# Patient Record
Sex: FEMALE | Race: WHITE | Employment: OTHER | URBAN - NONMETROPOLITAN AREA
[De-identification: names, ages, dates, MRNs, and addresses within clinical notes are randomized per-mention and may not be internally consistent; named-entity substitution may affect disease eponyms.]

---

## 2017-03-10 RX ORDER — LISINOPRIL 40 MG/1
TABLET ORAL
Qty: 90 TABLET | Refills: 4 | OUTPATIENT
Start: 2017-03-10

## 2017-03-24 RX ORDER — LISINOPRIL 40 MG/1
TABLET ORAL
Qty: 60 TABLET | Refills: 0 | Status: ON HOLD | OUTPATIENT
Start: 2017-03-24 | End: 2017-10-27

## 2017-05-08 RX ORDER — ATENOLOL 50 MG/1
TABLET ORAL
Qty: 30 TABLET | Refills: 1 | Status: SHIPPED | OUTPATIENT
Start: 2017-05-08 | End: 2017-07-07 | Stop reason: SDUPTHER

## 2017-05-08 RX ORDER — CLONIDINE HYDROCHLORIDE 0.1 MG/1
TABLET ORAL
Qty: 60 TABLET | Refills: 1 | Status: SHIPPED | OUTPATIENT
Start: 2017-05-08 | End: 2017-07-07 | Stop reason: SDUPTHER

## 2017-06-05 ENCOUNTER — OFFICE VISIT (OUTPATIENT)
Dept: VASCULAR SURGERY | Age: 57
End: 2017-06-05
Payer: MEDICAID

## 2017-06-05 ENCOUNTER — HOSPITAL ENCOUNTER (OUTPATIENT)
Dept: ULTRASOUND IMAGING | Age: 57
Discharge: HOME | End: 2017-06-05
Payer: MEDICAID

## 2017-06-05 VITALS
HEART RATE: 81 BPM | TEMPERATURE: 96 F | RESPIRATION RATE: 18 BRPM | SYSTOLIC BLOOD PRESSURE: 144 MMHG | DIASTOLIC BLOOD PRESSURE: 88 MMHG

## 2017-06-05 DIAGNOSIS — I77.819 ECTATIC AORTA (HCC): ICD-10-CM

## 2017-06-05 DIAGNOSIS — I71.40 ABDOMINAL AORTIC ANEURYSM WITHOUT RUPTURE: ICD-10-CM

## 2017-06-05 PROCEDURE — 99212 OFFICE O/P EST SF 10 MIN: CPT | Performed by: NURSE PRACTITIONER

## 2017-06-05 PROCEDURE — 93978 VASCULAR STUDY: CPT

## 2017-06-06 RX ORDER — LISINOPRIL 40 MG/1
TABLET ORAL
Qty: 60 TABLET | Refills: 0 | OUTPATIENT
Start: 2017-06-06

## 2017-07-07 RX ORDER — ATENOLOL 50 MG/1
TABLET ORAL
Qty: 30 TABLET | Refills: 0 | Status: SHIPPED | OUTPATIENT
Start: 2017-07-07 | End: 2017-08-04 | Stop reason: SDUPTHER

## 2017-07-07 RX ORDER — CLONIDINE HYDROCHLORIDE 0.1 MG/1
TABLET ORAL
Qty: 60 TABLET | Refills: 0 | Status: SHIPPED | OUTPATIENT
Start: 2017-07-07 | End: 2017-08-05 | Stop reason: SDUPTHER

## 2017-08-04 RX ORDER — ATENOLOL 50 MG/1
TABLET ORAL
Qty: 30 TABLET | Refills: 2 | Status: SHIPPED | OUTPATIENT
Start: 2017-08-04 | End: 2017-10-27 | Stop reason: HOSPADM

## 2017-08-07 RX ORDER — CLONIDINE HYDROCHLORIDE 0.1 MG/1
0.1 TABLET ORAL 2 TIMES DAILY
Qty: 60 TABLET | Refills: 1 | Status: ON HOLD | OUTPATIENT
Start: 2017-08-07 | End: 2017-10-27

## 2017-09-21 RX ORDER — NITROGLYCERIN 0.4 MG/1
TABLET SUBLINGUAL
Qty: 25 TABLET | Refills: 0 | OUTPATIENT
Start: 2017-09-21

## 2017-10-02 RX ORDER — CLONIDINE HYDROCHLORIDE 0.1 MG/1
TABLET ORAL
Qty: 60 TABLET | Refills: 1 | OUTPATIENT
Start: 2017-10-02

## 2017-10-24 ENCOUNTER — HOSPITAL ENCOUNTER (EMERGENCY)
Facility: HOSPITAL | Age: 57
Discharge: HOME | End: 2017-10-24
Attending: EMERGENCY MEDICINE | Admitting: EMERGENCY MEDICINE

## 2017-10-24 VITALS
RESPIRATION RATE: 14 BRPM | WEIGHT: 162 LBS | HEART RATE: 84 BPM | BODY MASS INDEX: 28.7 KG/M2 | SYSTOLIC BLOOD PRESSURE: 96 MMHG | TEMPERATURE: 97.9 F | OXYGEN SATURATION: 91 % | HEIGHT: 63 IN | DIASTOLIC BLOOD PRESSURE: 65 MMHG

## 2017-10-24 DIAGNOSIS — R07.89 CHEST WALL PAIN: Primary | ICD-10-CM

## 2017-10-24 LAB — TROPONIN I SERPL-MCNC: <0.012 NG/ML (ref 0–0.03)

## 2017-10-24 PROCEDURE — 25010000002 HYDROMORPHONE PER 4 MG: Performed by: EMERGENCY MEDICINE

## 2017-10-24 PROCEDURE — 96375 TX/PRO/DX INJ NEW DRUG ADDON: CPT

## 2017-10-24 PROCEDURE — 96376 TX/PRO/DX INJ SAME DRUG ADON: CPT

## 2017-10-24 PROCEDURE — 99284 EMERGENCY DEPT VISIT MOD MDM: CPT

## 2017-10-24 PROCEDURE — 93010 ELECTROCARDIOGRAM REPORT: CPT | Performed by: INTERNAL MEDICINE

## 2017-10-24 PROCEDURE — 84484 ASSAY OF TROPONIN QUANT: CPT | Performed by: EMERGENCY MEDICINE

## 2017-10-24 PROCEDURE — 96374 THER/PROPH/DIAG INJ IV PUSH: CPT

## 2017-10-24 PROCEDURE — 25010000002 MORPHINE PER 10 MG: Performed by: EMERGENCY MEDICINE

## 2017-10-24 PROCEDURE — 25010000002 ONDANSETRON PER 1 MG: Performed by: EMERGENCY MEDICINE

## 2017-10-24 PROCEDURE — 93005 ELECTROCARDIOGRAM TRACING: CPT | Performed by: EMERGENCY MEDICINE

## 2017-10-24 RX ORDER — ONDANSETRON 2 MG/ML
4 INJECTION INTRAMUSCULAR; INTRAVENOUS ONCE
Status: COMPLETED | OUTPATIENT
Start: 2017-10-24 | End: 2017-10-24

## 2017-10-24 RX ORDER — OXYCODONE AND ACETAMINOPHEN 7.5; 325 MG/1; MG/1
1 TABLET ORAL EVERY 4 HOURS PRN
Qty: 15 TABLET | Refills: 0 | Status: SHIPPED | OUTPATIENT
Start: 2017-10-24 | End: 2018-05-10 | Stop reason: HOSPADM

## 2017-10-24 RX ORDER — MORPHINE SULFATE 4 MG/ML
4 INJECTION, SOLUTION INTRAMUSCULAR; INTRAVENOUS ONCE
Status: COMPLETED | OUTPATIENT
Start: 2017-10-24 | End: 2017-10-24

## 2017-10-24 RX ORDER — METHYLPREDNISOLONE 4 MG/1
TABLET ORAL
Qty: 1 EACH | Refills: 0 | Status: SHIPPED | OUTPATIENT
Start: 2017-10-24 | End: 2018-02-27

## 2017-10-24 RX ADMIN — ONDANSETRON 4 MG: 2 INJECTION INTRAMUSCULAR; INTRAVENOUS at 05:11

## 2017-10-24 RX ADMIN — MORPHINE SULFATE 4 MG: 4 INJECTION, SOLUTION INTRAMUSCULAR; INTRAVENOUS at 04:28

## 2017-10-24 RX ADMIN — ONDANSETRON 4 MG: 2 INJECTION INTRAMUSCULAR; INTRAVENOUS at 04:28

## 2017-10-24 RX ADMIN — HYDROMORPHONE HYDROCHLORIDE 1 MG: 1 INJECTION, SOLUTION INTRAMUSCULAR; INTRAVENOUS; SUBCUTANEOUS at 05:11

## 2017-10-24 NOTE — ED PROVIDER NOTES
Subjective   HPI Comments: Patient complains of sudden onset of severe anterior chest pain tonight while watching TV.  She describes the pain as being very sharp in nature.  It is different than her heart pain in the past but she is concerned about her heart because of her history so she went to the [facility] emergency room.  Her evaluation there was negative but they sent her here for further evaluation.  She says she has had a bad cough and congestion over the past week or 2.  She does not really relate this to breathing but says coughing and movement does make the pain worse.  Her heart pain in the past was more of a pressure sensation and not like this.    Patient is a 57 y.o. female presenting with chest pain.   History provided by:  Patient   used: No    Chest Pain   Pain location:  L chest and R chest  Pain quality: aching and sharp    Pain radiates to:  Does not radiate  Pain severity:  Severe  Onset quality:  Sudden  Duration:  10 hours  Timing:  Constant  Progression:  Unchanged  Chronicity:  New  Context: movement    Context: not breathing, not drug use, not eating, not intercourse, not lifting, not raising an arm, not at rest, not stress and not trauma    Relieved by:  Nothing  Worsened by:  Coughing and movement  Ineffective treatments:  None tried  Associated symptoms: cough and shortness of breath    Associated symptoms: no abdominal pain, no AICD problem, no altered mental status, no anorexia, no anxiety, no back pain, no claudication, no diaphoresis, no dizziness, no dysphagia, no fatigue, no fever, no headache, no heartburn, no lower extremity edema, no nausea, no near-syncope, no numbness, no orthopnea, no palpitations, no PND, no syncope, no vomiting and no weakness    Risk factors: coronary artery disease and smoking    Risk factors: no aortic disease, no birth control, no diabetes mellitus, no Omar-Danlos syndrome, no high cholesterol, no hypertension, no  immobilization, not male, no Marfan's syndrome, not obese, not pregnant, no prior DVT/PE and no surgery        Review of Systems   Constitutional: Negative.  Negative for diaphoresis, fatigue and fever.   HENT: Negative.  Negative for trouble swallowing.    Respiratory: Positive for cough and shortness of breath.    Cardiovascular: Positive for chest pain. Negative for palpitations, orthopnea, claudication, syncope, PND and near-syncope.   Gastrointestinal: Negative.  Negative for abdominal pain, anorexia, heartburn, nausea and vomiting.   Genitourinary: Negative.    Musculoskeletal: Negative.  Negative for back pain.   Skin: Negative.    Neurological: Negative.  Negative for dizziness, weakness, numbness and headaches.   Hematological: Negative.    Psychiatric/Behavioral: Negative.    All other systems reviewed and are negative.      Past Medical History:   Diagnosis Date   • COPD (chronic obstructive pulmonary disease)    • Hypertension    • Low back pain potentially associated with spinal stenosis    • Lupus    • Scoliosis        Allergies   Allergen Reactions   • Aspirin      Nausea and vomitting   • Latex      Blisters with tape and intolerance with gloves       Past Surgical History:   Procedure Laterality Date   • CARDIAC CATHETERIZATION     • CARDIAC DEFIBRILLATOR PLACEMENT     • CHOLECYSTECTOMY     • HYSTERECTOMY         History reviewed. No pertinent family history.    Social History     Social History   • Marital status: Single     Spouse name: N/A   • Number of children: N/A   • Years of education: N/A     Social History Main Topics   • Smoking status: Current Every Day Smoker     Packs/day: 1.00     Types: Cigarettes   • Smokeless tobacco: None   • Alcohol use No   • Drug use: No   • Sexual activity: Defer     Other Topics Concern   • None     Social History Narrative   • None       Prior to Admission medications    Medication Sig Start Date End Date Taking? Authorizing Provider   atenolol (TENORMIN) 50  MG tablet TAKE 1 TABLET BY MOUTH DAILY 8/4/17  Yes Robert Padilla MD   CloNIDine (CATAPRES) 0.1 MG tablet Take 1 tablet by mouth 2 (Two) Times a Day. Needs appt for further refills 8/7/17  Yes Robert Padilla MD   diazePAM (VALIUM) 10 MG tablet 5 mg Every 6 (Six) Hours.   Yes Historical Provider, MD   doxepin (SINEquan) 100 MG capsule Take 100 mg by mouth nightly   Yes Historical Provider, MD   estrogens, conjugated, (PREMARIN) 0.625 MG tablet Take 0.625 mg by mouth daily.   Yes Historical Provider, MD   gabapentin (NEURONTIN) 300 MG capsule Take 300 mg by mouth Daily.   Yes Historical Provider, MD   hydrochlorothiazide (HYDRODIURIL) 25 MG tablet TAKE 1 TABLET BY MOUTH DAILY 11/9/16  Yes Roebrt Padilla MD   HYDROcodone-acetaminophen (NORCO)  MG per tablet Every 6 (Six) Hours.   Yes Historical Provider, MD   lisinopril (PRINIVIL,ZESTRIL) 40 MG tablet 1 (ONE) TABLET, ORAL, TWO TIMES DAILY 3/24/17  Yes Robert Padilla MD   nitroglycerin (NITROSTAT) 0.4 MG SL tablet Place 0.4 mg under the tongue every 5 minutes as needed.     Yes Historical Provider, MD   omeprazole (priLOSEC) 20 MG capsule Take 20 mg by mouth daily   Yes Historical Provider, MD   SUMAtriptan (IMITREX) 100 MG tablet Take 100 mg by mouth once as needed for Migraine   Yes Historical Provider, MD   hydrochlorothiazide (HYDRODIURIL) 25 MG tablet Take 25 mg by mouth daily    Historical Provider, MD   lisinopril (PRINIVIL,ZESTRIL) 20 MG tablet Take 1 tablet by mouth 2 times daily. 2/6/13   Historical Provider, MD   MethylPREDNISolone (MEDROL, KENN,) 4 MG tablet Take as directed on package instructions. 10/24/17   Imtiaz Felix Jr., MD   oxyCODONE-acetaminophen (PERCOCET) 7.5-325 MG per tablet Take 1 tablet by mouth Every 4 (Four) Hours As Needed for Moderate Pain . 10/24/17   Imtiaz Felix Jr., MD       Medications   Morphine sulfate (PF) injection 4 mg (4 mg Intravenous Given 10/24/17 0428)   ondansetron (ZOFRAN) injection 4 mg (4 mg Intravenous Given  10/24/17 0428)   HYDROmorphone (DILAUDID) injection 1 mg (1 mg Intravenous Given 10/24/17 0511)   ondansetron (ZOFRAN) injection 4 mg (4 mg Intravenous Given 10/24/17 0511)       Vitals:    10/24/17 0446   BP: 110/58   Pulse:    Resp:    Temp:    SpO2:          Objective   Physical Exam   Constitutional: She is oriented to person, place, and time. She appears well-developed and well-nourished.   HENT:   Head: Normocephalic and atraumatic.   Mouth/Throat: Oropharynx is clear and moist.   Eyes: EOM are normal. Pupils are equal, round, and reactive to light.   Neck: Neck supple.   Cardiovascular: Normal rate and regular rhythm.    Pulmonary/Chest: Effort normal and breath sounds normal. She exhibits tenderness.   Patient is markedly tender to palpation across the anterior chest wall.  There is no crepitus or deformity noted.  She is slightly tachypnea because she does not take a deep breath because the pain.   Abdominal: Soft. Bowel sounds are normal.   Musculoskeletal: Normal range of motion.   Neurological: She is alert and oriented to person, place, and time.   Skin: Skin is warm and dry.   Psychiatric: She has a normal mood and affect. Her behavior is normal.   Nursing note and vitals reviewed.      Procedures         Lab Results (last 24 hours)     Procedure Component Value Units Date/Time    Troponin [[id number]]  (Normal) Collected:  10/24/17 0416    Specimen:  Blood Updated:  10/24/17 0445     Troponin I <0.012 ng/mL           No orders to display       ED Course  ED Course   Comment By Time   I told the patient that her testing is negative for any evidence of myocardial infarction.  I do not think this is cardiac pain but more to be a chest wall pain but I could not be 100% certain.  I offered to keep her for a stress test or to Cipro treat her as a chest wall pain that I think it is and after discussion with her  she decided she wanted treatment for chest wall pain and to go home.  She is discharged in  stable condition. Imtiaz Felix Jr., MD 10/24 0527         HEART Score (for prediction of 6-week risk of major adverse cardiac event) reviewed and/or performed as part of the patient evaluation and treatment planning process.  The result associated with this review/performance is: 4      MDM  Number of Diagnoses or Management Options  Chest wall pain: new and requires workup     Amount and/or Complexity of Data Reviewed  Clinical lab tests: reviewed and ordered  Tests in the radiology section of CPT®: ordered and reviewed  Tests in the medicine section of CPT®: ordered and reviewed    Risk of Complications, Morbidity, and/or Mortality  Presenting problems: moderate  Diagnostic procedures: moderate  Management options: moderate    Patient Progress  Patient progress: stable      Final diagnoses:   Chest wall pain          Imtiaz Felix Jr., MD  10/24/17 0527

## 2017-10-25 ENCOUNTER — HOSPITAL ENCOUNTER (OUTPATIENT)
Facility: HOSPITAL | Age: 57
Setting detail: OBSERVATION
Discharge: HOME | End: 2017-10-27
Attending: INTERNAL MEDICINE | Admitting: INTERNAL MEDICINE

## 2017-10-25 ENCOUNTER — APPOINTMENT (OUTPATIENT)
Dept: CARDIOLOGY | Facility: HOSPITAL | Age: 57
End: 2017-10-25

## 2017-10-25 ENCOUNTER — APPOINTMENT (OUTPATIENT)
Dept: GENERAL RADIOLOGY | Facility: HOSPITAL | Age: 57
End: 2017-10-25

## 2017-10-25 ENCOUNTER — OFFICE VISIT (OUTPATIENT)
Dept: CARDIOLOGY | Facility: CLINIC | Age: 57
End: 2017-10-25

## 2017-10-25 ENCOUNTER — APPOINTMENT (OUTPATIENT)
Dept: CT IMAGING | Facility: HOSPITAL | Age: 57
End: 2017-10-25

## 2017-10-25 VITALS
RESPIRATION RATE: 18 BRPM | HEIGHT: 63 IN | WEIGHT: 177 LBS | HEART RATE: 92 BPM | DIASTOLIC BLOOD PRESSURE: 82 MMHG | BODY MASS INDEX: 31.36 KG/M2 | SYSTOLIC BLOOD PRESSURE: 128 MMHG

## 2017-10-25 DIAGNOSIS — R06.02 SHORTNESS OF BREATH: Primary | ICD-10-CM

## 2017-10-25 DIAGNOSIS — R10.11 RIGHT UPPER QUADRANT ABDOMINAL PAIN: ICD-10-CM

## 2017-10-25 DIAGNOSIS — I25.10 NONOBSTRUCTIVE ATHEROSCLEROSIS OF CORONARY ARTERY: ICD-10-CM

## 2017-10-25 DIAGNOSIS — Z72.0 TOBACCO ABUSE: ICD-10-CM

## 2017-10-25 DIAGNOSIS — I10 ESSENTIAL HYPERTENSION: ICD-10-CM

## 2017-10-25 DIAGNOSIS — M79.89 LEG SWELLING: ICD-10-CM

## 2017-10-25 DIAGNOSIS — E78.2 MIXED HYPERLIPIDEMIA: ICD-10-CM

## 2017-10-25 DIAGNOSIS — R07.2 PRECORDIAL PAIN: ICD-10-CM

## 2017-10-25 PROBLEM — E78.5 HYPERLIPIDEMIA: Status: ACTIVE | Noted: 2017-10-25

## 2017-10-25 PROBLEM — R06.00 DYSPNEA: Status: ACTIVE | Noted: 2017-10-25

## 2017-10-25 PROBLEM — R10.9 ABDOMINAL PAIN: Status: ACTIVE | Noted: 2017-10-25

## 2017-10-25 LAB
ALBUMIN SERPL-MCNC: 4.5 G/DL (ref 3.5–5)
ALBUMIN/GLOB SERPL: 1.4 G/DL (ref 1.1–2.5)
ALP SERPL-CCNC: 85 U/L (ref 24–120)
ALT SERPL W P-5'-P-CCNC: 29 U/L (ref 0–54)
AMYLASE SERPL-CCNC: 70 U/L (ref 30–110)
ANION GAP SERPL CALCULATED.3IONS-SCNC: 13 MMOL/L (ref 4–13)
AST SERPL-CCNC: 21 U/L (ref 7–45)
BASOPHILS # BLD AUTO: 0.01 10*3/MM3 (ref 0–0.2)
BASOPHILS NFR BLD AUTO: 0.1 % (ref 0–2)
BH CV ECHO MEAS - AO MAX PG (FULL): 5.5 MMHG
BH CV ECHO MEAS - AO MAX PG: 7.6 MMHG
BH CV ECHO MEAS - AO MEAN PG (FULL): 3 MMHG
BH CV ECHO MEAS - AO MEAN PG: 4 MMHG
BH CV ECHO MEAS - AO ROOT AREA (BSA CORRECTED): 1.4
BH CV ECHO MEAS - AO ROOT AREA: 4.9 CM^2
BH CV ECHO MEAS - AO ROOT DIAM: 2.5 CM
BH CV ECHO MEAS - AO V2 MAX: 138 CM/SEC
BH CV ECHO MEAS - AO V2 MEAN: 91.2 CM/SEC
BH CV ECHO MEAS - AO V2 VTI: 24.7 CM
BH CV ECHO MEAS - AVA(I,A): 2.1 CM^2
BH CV ECHO MEAS - AVA(I,D): 2.1 CM^2
BH CV ECHO MEAS - AVA(V,A): 1.6 CM^2
BH CV ECHO MEAS - AVA(V,D): 1.6 CM^2
BH CV ECHO MEAS - BSA(HAYCOCK): 1.9 M^2
BH CV ECHO MEAS - BSA: 1.8 M^2
BH CV ECHO MEAS - BZI_BMI: 32.4 KILOGRAMS/M^2
BH CV ECHO MEAS - BZI_METRIC_HEIGHT: 157.5 CM
BH CV ECHO MEAS - BZI_METRIC_WEIGHT: 80.3 KG
BH CV ECHO MEAS - CONTRAST EF (2CH): 56.1 ML/M^2
BH CV ECHO MEAS - CONTRAST EF 4CH: 57.9 ML/M^2
BH CV ECHO MEAS - EDV(CUBED): 78.4 ML
BH CV ECHO MEAS - EDV(MOD-SP2): 58.5 ML
BH CV ECHO MEAS - EDV(MOD-SP4): 55.6 ML
BH CV ECHO MEAS - EDV(TEICH): 82.2 ML
BH CV ECHO MEAS - EF(CUBED): 72.9 %
BH CV ECHO MEAS - EF(MOD-SP2): 56.1 %
BH CV ECHO MEAS - EF(MOD-SP4): 57.9 %
BH CV ECHO MEAS - EF(TEICH): 65 %
BH CV ECHO MEAS - ESV(CUBED): 21.3 ML
BH CV ECHO MEAS - ESV(MOD-SP2): 25.7 ML
BH CV ECHO MEAS - ESV(MOD-SP4): 23.4 ML
BH CV ECHO MEAS - ESV(TEICH): 28.8 ML
BH CV ECHO MEAS - FS: 35.3 %
BH CV ECHO MEAS - IVS/LVPW: 1.5
BH CV ECHO MEAS - IVSD: 1.6 CM
BH CV ECHO MEAS - LA DIMENSION: 4 CM
BH CV ECHO MEAS - LA/AO: 1.6
BH CV ECHO MEAS - LAT PEAK E' VEL: 6.5 CM/SEC
BH CV ECHO MEAS - LV DIASTOLIC VOL/BSA (35-75): 30.6 ML/M^2
BH CV ECHO MEAS - LV MASS(C)D: 202.8 GRAMS
BH CV ECHO MEAS - LV MASS(C)DI: 111.7 GRAMS/M^2
BH CV ECHO MEAS - LV MAX PG: 2.1 MMHG
BH CV ECHO MEAS - LV MEAN PG: 1 MMHG
BH CV ECHO MEAS - LV SYSTOLIC VOL/BSA (12-30): 12.9 ML/M^2
BH CV ECHO MEAS - LV V1 MAX: 72.4 CM/SEC
BH CV ECHO MEAS - LV V1 MEAN: 51 CM/SEC
BH CV ECHO MEAS - LV V1 VTI: 16.7 CM
BH CV ECHO MEAS - LVIDD: 4.3 CM
BH CV ECHO MEAS - LVIDS: 2.8 CM
BH CV ECHO MEAS - LVLD AP2: 6.7 CM
BH CV ECHO MEAS - LVLD AP4: 7.1 CM
BH CV ECHO MEAS - LVLS AP2: 5.7 CM
BH CV ECHO MEAS - LVLS AP4: 5.8 CM
BH CV ECHO MEAS - LVOT AREA (M): 3.1 CM^2
BH CV ECHO MEAS - LVOT AREA: 3.1 CM^2
BH CV ECHO MEAS - LVOT DIAM: 2 CM
BH CV ECHO MEAS - LVPWD: 12 CM
BH CV ECHO MEAS - MED PEAK E' VEL: 7.07 CM/SEC
BH CV ECHO MEAS - MV A MAX VEL: 75.2 CM/SEC
BH CV ECHO MEAS - MV DEC TIME: 0.19 SEC
BH CV ECHO MEAS - MV E MAX VEL: 63.5 CM/SEC
BH CV ECHO MEAS - MV E/A: 0.84
BH CV ECHO MEAS - RAP SYSTOLE: 5 MMHG
BH CV ECHO MEAS - RVSP: 36.4 MMHG
BH CV ECHO MEAS - SI(AO): 66.8 ML/M^2
BH CV ECHO MEAS - SI(CUBED): 31.5 ML/M^2
BH CV ECHO MEAS - SI(LVOT): 28.9 ML/M^2
BH CV ECHO MEAS - SI(MOD-SP2): 18.1 ML/M^2
BH CV ECHO MEAS - SI(MOD-SP4): 17.7 ML/M^2
BH CV ECHO MEAS - SI(TEICH): 29.4 ML/M^2
BH CV ECHO MEAS - SV(AO): 121.2 ML
BH CV ECHO MEAS - SV(CUBED): 57.1 ML
BH CV ECHO MEAS - SV(LVOT): 52.5 ML
BH CV ECHO MEAS - SV(MOD-SP2): 32.8 ML
BH CV ECHO MEAS - SV(MOD-SP4): 32.2 ML
BH CV ECHO MEAS - SV(TEICH): 53.4 ML
BH CV ECHO MEAS - TR MAX VEL: 280 CM/SEC
BILIRUB SERPL-MCNC: 0.3 MG/DL (ref 0.1–1)
BUN BLD-MCNC: 18 MG/DL (ref 5–21)
BUN/CREAT SERPL: 31.6 (ref 7–25)
CALCIUM SPEC-SCNC: 10 MG/DL (ref 8.4–10.4)
CHLORIDE SERPL-SCNC: 102 MMOL/L (ref 98–110)
CK MB SERPL-CCNC: 1.33 NG/ML (ref 0–5)
CK SERPL-CCNC: 48 U/L (ref 0–203)
CO2 SERPL-SCNC: 25 MMOL/L (ref 24–31)
CREAT BLD-MCNC: 0.57 MG/DL (ref 0.5–1.4)
D DIMER PPP FEU-MCNC: 1.12 MG/L (FEU) (ref 0–0.5)
DEPRECATED RDW RBC AUTO: 48.2 FL (ref 40–54)
E/E' RATIO: 9.7
EOSINOPHIL # BLD AUTO: 0.03 10*3/MM3 (ref 0–0.7)
EOSINOPHIL NFR BLD AUTO: 0.2 % (ref 0–4)
ERYTHROCYTE [DISTWIDTH] IN BLOOD BY AUTOMATED COUNT: 14.1 % (ref 12–15)
GFR SERPL CREATININE-BSD FRML MDRD: 109 ML/MIN/1.73
GLOBULIN UR ELPH-MCNC: 3.3 GM/DL
GLUCOSE BLD-MCNC: 110 MG/DL (ref 70–100)
HCT VFR BLD AUTO: 45.5 % (ref 37–47)
HGB BLD-MCNC: 14.8 G/DL (ref 12–16)
IMM GRANULOCYTES # BLD: 0.04 10*3/MM3 (ref 0–0.03)
IMM GRANULOCYTES NFR BLD: 0.3 % (ref 0–5)
LEFT ATRIUM VOLUME INDEX: 21.1 ML/M2
LEFT ATRIUM VOLUME: 38.2 CM3
LIPASE SERPL-CCNC: 47 U/L (ref 23–203)
LV EF 2D ECHO EST: 60 %
LYMPHOCYTES # BLD AUTO: 2.29 10*3/MM3 (ref 0.72–4.86)
LYMPHOCYTES NFR BLD AUTO: 14.9 % (ref 15–45)
MCH RBC QN AUTO: 30.5 PG (ref 28–32)
MCHC RBC AUTO-ENTMCNC: 32.5 G/DL (ref 33–36)
MCV RBC AUTO: 93.8 FL (ref 82–98)
MONOCYTES # BLD AUTO: 0.97 10*3/MM3 (ref 0.19–1.3)
MONOCYTES NFR BLD AUTO: 6.3 % (ref 4–12)
NEUTROPHILS # BLD AUTO: 12.07 10*3/MM3 (ref 1.87–8.4)
NEUTROPHILS NFR BLD AUTO: 78.2 % (ref 39–78)
NT-PROBNP SERPL-MCNC: 800 PG/ML (ref 0–900)
PLATELET # BLD AUTO: 320 10*3/MM3 (ref 130–400)
PMV BLD AUTO: 11.3 FL (ref 6–12)
POTASSIUM BLD-SCNC: 3.7 MMOL/L (ref 3.5–5.3)
PROT SERPL-MCNC: 7.8 G/DL (ref 6.3–8.7)
RBC # BLD AUTO: 4.85 10*6/MM3 (ref 4.2–5.4)
SODIUM BLD-SCNC: 140 MMOL/L (ref 135–145)
TROPONIN I SERPL-MCNC: <0.012 NG/ML (ref 0–0.03)
TROPONIN I SERPL-MCNC: <0.012 NG/ML (ref 0–0.03)
WBC NRBC COR # BLD: 15.41 10*3/MM3 (ref 4.8–10.8)

## 2017-10-25 PROCEDURE — 0 IOPAMIDOL 61 % SOLUTION: Performed by: INTERNAL MEDICINE

## 2017-10-25 PROCEDURE — 93306 TTE W/DOPPLER COMPLETE: CPT | Performed by: INTERNAL MEDICINE

## 2017-10-25 PROCEDURE — 82553 CREATINE MB FRACTION: CPT | Performed by: INTERNAL MEDICINE

## 2017-10-25 PROCEDURE — 84484 ASSAY OF TROPONIN QUANT: CPT | Performed by: INTERNAL MEDICINE

## 2017-10-25 PROCEDURE — 25010000002 FUROSEMIDE PER 20 MG: Performed by: INTERNAL MEDICINE

## 2017-10-25 PROCEDURE — G0378 HOSPITAL OBSERVATION PER HR: HCPCS

## 2017-10-25 PROCEDURE — 85025 COMPLETE CBC W/AUTO DIFF WBC: CPT | Performed by: PHYSICIAN ASSISTANT

## 2017-10-25 PROCEDURE — 93000 ELECTROCARDIOGRAM COMPLETE: CPT | Performed by: NURSE PRACTITIONER

## 2017-10-25 PROCEDURE — 85379 FIBRIN DEGRADATION QUANT: CPT | Performed by: PHYSICIAN ASSISTANT

## 2017-10-25 PROCEDURE — 96374 THER/PROPH/DIAG INJ IV PUSH: CPT

## 2017-10-25 PROCEDURE — 25010000002 ENOXAPARIN PER 10 MG: Performed by: PHYSICIAN ASSISTANT

## 2017-10-25 PROCEDURE — 71020 HC CHEST PA AND LATERAL: CPT

## 2017-10-25 PROCEDURE — 83690 ASSAY OF LIPASE: CPT | Performed by: PHYSICIAN ASSISTANT

## 2017-10-25 PROCEDURE — 74178 CT ABD&PLV WO CNTR FLWD CNTR: CPT

## 2017-10-25 PROCEDURE — 82550 ASSAY OF CK (CPK): CPT | Performed by: INTERNAL MEDICINE

## 2017-10-25 PROCEDURE — 96372 THER/PROPH/DIAG INJ SC/IM: CPT

## 2017-10-25 PROCEDURE — 93306 TTE W/DOPPLER COMPLETE: CPT

## 2017-10-25 PROCEDURE — G0379 DIRECT REFER HOSPITAL OBSERV: HCPCS

## 2017-10-25 PROCEDURE — 80053 COMPREHEN METABOLIC PANEL: CPT | Performed by: PHYSICIAN ASSISTANT

## 2017-10-25 PROCEDURE — 82150 ASSAY OF AMYLASE: CPT | Performed by: PHYSICIAN ASSISTANT

## 2017-10-25 PROCEDURE — 83880 ASSAY OF NATRIURETIC PEPTIDE: CPT | Performed by: PHYSICIAN ASSISTANT

## 2017-10-25 RX ORDER — PANTOPRAZOLE SODIUM 40 MG/1
40 TABLET, DELAYED RELEASE ORAL
Status: DISCONTINUED | OUTPATIENT
Start: 2017-10-26 | End: 2017-10-27 | Stop reason: HOSPADM

## 2017-10-25 RX ORDER — BUSPIRONE HYDROCHLORIDE 10 MG/1
10 TABLET ORAL DAILY
COMMUNITY
End: 2017-12-01 | Stop reason: DRUGHIGH

## 2017-10-25 RX ORDER — GABAPENTIN 300 MG/1
300 CAPSULE ORAL 3 TIMES DAILY
Status: DISCONTINUED | OUTPATIENT
Start: 2017-10-26 | End: 2017-10-25 | Stop reason: SDUPTHER

## 2017-10-25 RX ORDER — NITROGLYCERIN 0.4 MG/1
0.4 TABLET SUBLINGUAL
Status: DISCONTINUED | OUTPATIENT
Start: 2017-10-25 | End: 2017-10-27 | Stop reason: HOSPADM

## 2017-10-25 RX ORDER — GABAPENTIN 600 MG/1
600 TABLET ORAL DAILY
COMMUNITY

## 2017-10-25 RX ORDER — ATENOLOL 50 MG/1
50 TABLET ORAL
Status: DISCONTINUED | OUTPATIENT
Start: 2017-10-25 | End: 2017-10-27 | Stop reason: HOSPADM

## 2017-10-25 RX ORDER — LISINOPRIL 20 MG/1
40 TABLET ORAL
Status: DISCONTINUED | OUTPATIENT
Start: 2017-10-25 | End: 2017-10-27 | Stop reason: HOSPADM

## 2017-10-25 RX ORDER — GABAPENTIN 300 MG/1
300 CAPSULE ORAL 3 TIMES DAILY
Status: DISCONTINUED | OUTPATIENT
Start: 2017-10-25 | End: 2017-10-27 | Stop reason: HOSPADM

## 2017-10-25 RX ORDER — BUSPIRONE HYDROCHLORIDE 10 MG/1
10 TABLET ORAL DAILY
Status: DISCONTINUED | OUTPATIENT
Start: 2017-10-25 | End: 2017-10-27 | Stop reason: HOSPADM

## 2017-10-25 RX ORDER — HYDROCODONE BITARTRATE AND ACETAMINOPHEN 10; 325 MG/1; MG/1
1 TABLET ORAL EVERY 6 HOURS PRN
Status: DISCONTINUED | OUTPATIENT
Start: 2017-10-25 | End: 2017-10-27 | Stop reason: HOSPADM

## 2017-10-25 RX ORDER — SODIUM CHLORIDE 0.9 % (FLUSH) 0.9 %
1-10 SYRINGE (ML) INJECTION AS NEEDED
Status: DISCONTINUED | OUTPATIENT
Start: 2017-10-25 | End: 2017-10-27 | Stop reason: HOSPADM

## 2017-10-25 RX ORDER — CLONIDINE HYDROCHLORIDE 0.1 MG/1
0.1 TABLET ORAL 2 TIMES DAILY
Status: DISCONTINUED | OUTPATIENT
Start: 2017-10-25 | End: 2017-10-27 | Stop reason: HOSPADM

## 2017-10-25 RX ORDER — DOXEPIN HYDROCHLORIDE 10 MG/1
10 CAPSULE ORAL NIGHTLY
Status: DISCONTINUED | OUTPATIENT
Start: 2017-10-25 | End: 2017-10-27 | Stop reason: HOSPADM

## 2017-10-25 RX ORDER — NICOTINE 21 MG/24HR
1 PATCH, TRANSDERMAL 24 HOURS TRANSDERMAL EVERY 24 HOURS
Status: DISCONTINUED | OUTPATIENT
Start: 2017-10-25 | End: 2017-10-27 | Stop reason: HOSPADM

## 2017-10-25 RX ORDER — DOXEPIN HYDROCHLORIDE 10 MG/1
10 CAPSULE ORAL NIGHTLY
COMMUNITY
End: 2018-02-27

## 2017-10-25 RX ORDER — GABAPENTIN 300 MG/1
600 CAPSULE ORAL NIGHTLY
Status: DISCONTINUED | OUTPATIENT
Start: 2017-10-25 | End: 2017-10-27 | Stop reason: HOSPADM

## 2017-10-25 RX ORDER — GABAPENTIN 300 MG/1
600 CAPSULE ORAL NIGHTLY
Status: DISCONTINUED | OUTPATIENT
Start: 2017-10-25 | End: 2017-10-25 | Stop reason: SDUPTHER

## 2017-10-25 RX ORDER — FUROSEMIDE 10 MG/ML
40 INJECTION INTRAMUSCULAR; INTRAVENOUS EVERY 12 HOURS
Status: DISCONTINUED | OUTPATIENT
Start: 2017-10-25 | End: 2017-10-26

## 2017-10-25 RX ORDER — OXYCODONE AND ACETAMINOPHEN 7.5; 325 MG/1; MG/1
1 TABLET ORAL EVERY 6 HOURS PRN
Status: DISCONTINUED | OUTPATIENT
Start: 2017-10-25 | End: 2017-10-27 | Stop reason: HOSPADM

## 2017-10-25 RX ADMIN — ENOXAPARIN SODIUM 40 MG: 40 INJECTION SUBCUTANEOUS at 17:22

## 2017-10-25 RX ADMIN — IOPAMIDOL 100 ML: 612 INJECTION, SOLUTION INTRAVENOUS at 15:45

## 2017-10-25 RX ADMIN — ATENOLOL 50 MG: 50 TABLET ORAL at 17:22

## 2017-10-25 RX ADMIN — OXYCODONE HYDROCHLORIDE AND ACETAMINOPHEN 1 TABLET: 7.5; 325 TABLET ORAL at 17:54

## 2017-10-25 RX ADMIN — HYDROCODONE BITARTRATE AND ACETAMINOPHEN 1 TABLET: 10; 325 TABLET ORAL at 13:32

## 2017-10-25 RX ADMIN — NITROGLYCERIN 0.4 MG: 0.4 TABLET SUBLINGUAL at 12:25

## 2017-10-25 RX ADMIN — NITROGLYCERIN 0.4 MG: 0.4 TABLET SUBLINGUAL at 12:11

## 2017-10-25 RX ADMIN — FUROSEMIDE 40 MG: 10 INJECTION, SOLUTION INTRAMUSCULAR; INTRAVENOUS at 13:32

## 2017-10-25 RX ADMIN — GABAPENTIN 300 MG: 300 CAPSULE ORAL at 20:11

## 2017-10-25 RX ADMIN — BUSPIRONE HYDROCHLORIDE 10 MG: 10 TABLET ORAL at 17:22

## 2017-10-25 RX ADMIN — LISINOPRIL 40 MG: 20 TABLET ORAL at 17:22

## 2017-10-25 RX ADMIN — DOXEPIN HYDROCHLORIDE 10 MG: 10 CAPSULE ORAL at 20:11

## 2017-10-25 RX ADMIN — GABAPENTIN 600 MG: 300 CAPSULE ORAL at 23:27

## 2017-10-25 NOTE — PROGRESS NOTES
"    Subjective:     Encounter Date:10/25/2017      Patient ID: Akua Aguilar is a 57 y.o. female.    Chief Complaint:  Chest Pain    This is a new problem. The current episode started in the past 7 days. The onset quality is sudden. The problem occurs intermittently. The pain is at a severity of 0/10. The pain is moderate. The quality of the pain is described as dull. The pain radiates to the left arm. Associated symptoms include abdominal pain, a cough, nausea and shortness of breath. Pertinent negatives include no back pain, dizziness, fever, headaches, hemoptysis, malaise/fatigue, near-syncope, orthopnea, palpitations, PND, syncope or vomiting.   Abdominal Pain   This is a new problem. The current episode started 1 to 4 weeks ago. The onset quality is gradual. The problem occurs constantly. The problem has been gradually worsening. The pain is located in the RUQ. The pain is at a severity of 4/10. The pain is mild. The quality of the pain is aching. Associated symptoms include nausea. Pertinent negatives include no constipation, diarrhea, fever, headaches, hematuria, melena, vomiting or weight loss.   Shortness of Breath   This is a chronic problem. The current episode started more than 1 year ago. The problem occurs constantly. The problem has been rapidly worsening. Associated symptoms include abdominal pain, chest pain and leg swelling. Pertinent negatives include no fever, headaches, hemoptysis, orthopnea, PND, rash, syncope or vomiting. The symptoms are aggravated by any activity. Risk factors include smoking.     Patient presents today to office for follow up from being discharged from [facility] yesterday. Patient developed chest pain Sunday night and presented to [facility]. She states pain was some of the worse she has ever had and states \"I knew I had a heart attack.\" Reports pain was associated with worsened shortness of breath and radiating to left arm. Patient states she was transferred to " "our ER to see cardiologist but didn't get to see one. Patient states she had upper respiratory issues a few weeks ago with a cough. Patient states she also has had pain in her right upper quadrant with swelling and tightness. Patient has a history of nonobstructive coronary artery disease with 30% stenosis to LAD on a cath in 2016. Patient states she cannot tolerate a stress echo and had \"a deathly reaction to dobutamine.\" She did have a Lexiscan prior to her cath in 2016 which was positive for ischemia. Patient complains of occasional swelling on legs. Chronic shortness of breath that is worsening. Patient does continue to smoke. Patient states she has been out of some of her medications for some time, including her clonidine.     The following portions of the patient's history were reviewed and updated as appropriate: allergies, current medications, past family history, past medical history, past social history, past surgical history and problem list.   Prior to Admission medications    Medication Sig Start Date End Date Taking? Authorizing Provider   atenolol (TENORMIN) 50 MG tablet TAKE 1 TABLET BY MOUTH DAILY 8/4/17  Yes Robert Padilla MD   busPIRone (BUSPAR) 10 MG tablet Take 10 mg by mouth Daily.   Yes Historical Provider, MD   CloNIDine (CATAPRES) 0.1 MG tablet Take 1 tablet by mouth 2 (Two) Times a Day. Needs appt for further refills 8/7/17  Yes Robert Padilla MD   doxepin (SINEquan) 10 MG capsule Take 10 mg by mouth Every Night.   Yes Historical Provider, MD   gabapentin (NEURONTIN) 300 MG capsule Take 300 mg by mouth 3 (Three) Times a Day.   Yes Historical Provider, MD   gabapentin (NEURONTIN) 600 MG tablet Take 600 mg by mouth Every Night.   Yes Historical Provider, MD   hydrochlorothiazide (HYDRODIURIL) 25 MG tablet TAKE 1 TABLET BY MOUTH DAILY 11/9/16  Yes Robert Padilla MD   HYDROcodone-acetaminophen (NORCO)  MG per tablet Every 6 (Six) Hours.   Yes Historical Provider, MD   lisinopril " (PRINIVIL,ZESTRIL) 40 MG tablet 1 (ONE) TABLET, ORAL, TWO TIMES DAILY 3/24/17  Yes Robert Padilla MD   MethylPREDNISolone (MEDROL, KENN,) 4 MG tablet Take as directed on package instructions. 10/24/17  Yes Imtiaz Felix Jr., MD   nitroglycerin (NITROSTAT) 0.4 MG SL tablet Place 0.4 mg under the tongue every 5 minutes as needed.     Yes Historical Provider, MD   omeprazole (priLOSEC) 20 MG capsule Take 20 mg by mouth daily   Yes Historical Provider, MD   oxyCODONE-acetaminophen (PERCOCET) 7.5-325 MG per tablet Take 1 tablet by mouth Every 4 (Four) Hours As Needed for Moderate Pain . 10/24/17  Yes Imtiaz Felix Jr., MD   diazePAM (VALIUM) 10 MG tablet 5 mg Every 6 (Six) Hours.  10/25/17  Historical Provider, MD   doxepin (SINEquan) 100 MG capsule Take 100 mg by mouth nightly  10/25/17  Historical Provider, MD   estrogens, conjugated, (PREMARIN) 0.625 MG tablet Take 0.625 mg by mouth daily.  10/25/17  Historical Provider, MD   hydrochlorothiazide (HYDRODIURIL) 25 MG tablet Take 25 mg by mouth daily  10/25/17  Historical Provider, MD   lisinopril (PRINIVIL,ZESTRIL) 20 MG tablet Take 1 tablet by mouth 2 times daily. 2/6/13 10/25/17  Historical Provider, MD   SUMAtriptan (IMITREX) 100 MG tablet Take 100 mg by mouth once as needed for Migraine  10/25/17  Historical Provider, MD     Past Medical History:   Diagnosis Date   • COPD (chronic obstructive pulmonary disease)    • Hypertension    • Low back pain potentially associated with spinal stenosis    • Lupus    • Scoliosis        Review of Systems   Constitution: Negative for chills, decreased appetite, fever, malaise/fatigue, weight gain and weight loss.   HENT: Negative for nosebleeds.    Eyes: Negative for visual disturbance.   Cardiovascular: Positive for chest pain, dyspnea on exertion and leg swelling. Negative for near-syncope, orthopnea, palpitations, paroxysmal nocturnal dyspnea and syncope.   Respiratory: Positive for cough and shortness of breath. Negative  "for hemoptysis and snoring.    Endocrine: Negative for cold intolerance and heat intolerance.   Hematologic/Lymphatic: Negative for bleeding problem. Does not bruise/bleed easily.   Skin: Negative for rash.   Musculoskeletal: Negative for back pain and falls.   Gastrointestinal: Positive for bloating, abdominal pain and nausea. Negative for constipation, diarrhea, heartburn, melena and vomiting.   Genitourinary: Negative for hematuria.   Neurological: Negative for dizziness, headaches and light-headedness.   Psychiatric/Behavioral: Negative for altered mental status.   Allergic/Immunologic: Negative for persistent infections.         ECG 12 Lead  Date/Time: 10/25/2017 11:01 AM  Performed by: LUCAS MUÑOZ  Authorized by: LUCAS MUÑOZ   Comparison: compared with previous ECG from 11/22/2016  Similar to previous ECG  Rhythm: sinus rhythm  Conduction: incomplete LBBB               Objective:     Physical Exam   Constitutional: She is oriented to person, place, and time. She appears well-developed and well-nourished.   HENT:   Head: Normocephalic and atraumatic.   Eyes: Pupils are equal, round, and reactive to light.   Neck: Normal range of motion. Neck supple. No JVD present. Carotid bruit is not present.   Cardiovascular: Normal rate, regular rhythm, normal heart sounds and intact distal pulses.    Trace edema to lower legs   Pulmonary/Chest: Effort normal. She has wheezes.   Abdominal: Soft. Bowel sounds are normal. She exhibits distension. There is tenderness.   Musculoskeletal: Normal range of motion.   Neurological: She is alert and oriented to person, place, and time. She has normal reflexes.   Skin: Skin is warm and dry.   Psychiatric: She has a normal mood and affect. Her behavior is normal. Judgment and thought content normal.     Blood pressure 128/82, pulse 92, resp. rate 18, height 63\" (160 cm), weight 177 lb (80.3 kg)    Lab Review:       Assessment:          Diagnosis Plan   1. Shortness of breath   "   2. Precordial pain     3. Right upper quadrant abdominal pain     4. Leg swelling     5. Nonobstructive atherosclerosis of coronary artery     6. Essential hypertension     7. Mixed hyperlipidemia     8. Tobacco abuse            Plan:       Discussed with Dr. Padilla, Will admit  CBC, CMP, D-Dimer, Amylase, Lipase, Troponin, Lipid Panel, BNP  Chest X-Ray  CT of Abdomen and Pelvis  2D Echo  Will possibly need ischemic workup   NPO  Smoking Cessation  Monitor Telemetry  Daily Weights  I&Os

## 2017-10-25 NOTE — PLAN OF CARE
Problem: Patient Care Overview (Adult)  Goal: Plan of Care Review  Outcome: Ongoing (interventions implemented as appropriate)    10/25/17 1426   Coping/Psychosocial Response Interventions   Plan Of Care Reviewed With patient   Patient Care Overview   Progress no change   Outcome Evaluation   Outcome Summary/Follow up Plan PATIENT DIRECT ADMIT FROM DR BUITRAGO OFFICE WITH ACTIVE CHEST PAIN, SOA, RUQ PAIN/INFLAMMATION. NITRO GIVEN X3, NORCO GIVEN. CT ABD, CXR, ECHO ORDERED. PATIENT NPO. NICOTINE PATCH ORDERED, ADVISED NOT TO LEAVE FLOOR TO SMOKE. CONTINUE TO MONITOR.       Goal: Adult Individualization and Mutuality  Outcome: Ongoing (interventions implemented as appropriate)  Goal: Discharge Needs Assessment  Outcome: Ongoing (interventions implemented as appropriate)    Problem: Acute Coronary Syndrome (ACS) (Adult)  Goal: Signs and Symptoms of Listed Potential Problems Will be Absent or Manageable (Acute Coronary Syndrome)  Outcome: Ongoing (interventions implemented as appropriate)    Problem: Pain, Chronic (Adult)  Goal: Identify Related Risk Factors and Signs and Symptoms  Outcome: Ongoing (interventions implemented as appropriate)  Goal: Acceptable Pain Control/Comfort Level  Outcome: Ongoing (interventions implemented as appropriate)

## 2017-10-26 ENCOUNTER — APPOINTMENT (OUTPATIENT)
Dept: CT IMAGING | Facility: HOSPITAL | Age: 57
End: 2017-10-26

## 2017-10-26 ENCOUNTER — APPOINTMENT (OUTPATIENT)
Dept: ULTRASOUND IMAGING | Facility: HOSPITAL | Age: 57
End: 2017-10-26

## 2017-10-26 LAB
ARTICHOKE IGE QN: 111 MG/DL (ref 0–99)
CHOLEST SERPL-MCNC: 186 MG/DL (ref 130–200)
HDLC SERPL-MCNC: 55 MG/DL
LDLC/HDLC SERPL: 1.71 {RATIO}
TRIGL SERPL-MCNC: 185 MG/DL (ref 0–149)
TROPONIN I SERPL-MCNC: <0.012 NG/ML (ref 0–0.03)

## 2017-10-26 PROCEDURE — G0378 HOSPITAL OBSERVATION PER HR: HCPCS

## 2017-10-26 PROCEDURE — 85025 COMPLETE CBC W/AUTO DIFF WBC: CPT | Performed by: INTERNAL MEDICINE

## 2017-10-26 PROCEDURE — 71275 CT ANGIOGRAPHY CHEST: CPT

## 2017-10-26 PROCEDURE — 99225 PR SBSQ OBSERVATION CARE/DAY 25 MINUTES: CPT | Performed by: INTERNAL MEDICINE

## 2017-10-26 PROCEDURE — 96376 TX/PRO/DX INJ SAME DRUG ADON: CPT

## 2017-10-26 PROCEDURE — 25010000002 ENOXAPARIN PER 10 MG: Performed by: PHYSICIAN ASSISTANT

## 2017-10-26 PROCEDURE — 96372 THER/PROPH/DIAG INJ SC/IM: CPT

## 2017-10-26 PROCEDURE — 0 IOPAMIDOL PER 1 ML: Performed by: INTERNAL MEDICINE

## 2017-10-26 PROCEDURE — 25010000002 FUROSEMIDE PER 20 MG: Performed by: INTERNAL MEDICINE

## 2017-10-26 PROCEDURE — 80061 LIPID PANEL: CPT | Performed by: PHYSICIAN ASSISTANT

## 2017-10-26 RX ORDER — AMLODIPINE BESYLATE 5 MG/1
5 TABLET ORAL
Status: DISCONTINUED | OUTPATIENT
Start: 2017-10-26 | End: 2017-10-27 | Stop reason: HOSPADM

## 2017-10-26 RX ORDER — HYDROCHLOROTHIAZIDE 25 MG/1
25 TABLET ORAL DAILY
Status: DISCONTINUED | OUTPATIENT
Start: 2017-10-26 | End: 2017-10-27 | Stop reason: HOSPADM

## 2017-10-26 RX ADMIN — OXYCODONE HYDROCHLORIDE AND ACETAMINOPHEN 1 TABLET: 7.5; 325 TABLET ORAL at 21:23

## 2017-10-26 RX ADMIN — PANTOPRAZOLE SODIUM 40 MG: 40 TABLET, DELAYED RELEASE ORAL at 05:53

## 2017-10-26 RX ADMIN — AMLODIPINE BESYLATE 5 MG: 5 TABLET ORAL at 23:42

## 2017-10-26 RX ADMIN — LISINOPRIL 40 MG: 20 TABLET ORAL at 09:36

## 2017-10-26 RX ADMIN — FUROSEMIDE 40 MG: 10 INJECTION, SOLUTION INTRAMUSCULAR; INTRAVENOUS at 03:19

## 2017-10-26 RX ADMIN — OXYCODONE HYDROCHLORIDE AND ACETAMINOPHEN 1 TABLET: 7.5; 325 TABLET ORAL at 03:19

## 2017-10-26 RX ADMIN — OXYCODONE HYDROCHLORIDE AND ACETAMINOPHEN 1 TABLET: 7.5; 325 TABLET ORAL at 10:44

## 2017-10-26 RX ADMIN — HYDROCHLOROTHIAZIDE 25 MG: 25 TABLET ORAL at 18:39

## 2017-10-26 RX ADMIN — GABAPENTIN 600 MG: 300 CAPSULE ORAL at 21:09

## 2017-10-26 RX ADMIN — IOPAMIDOL 150 ML: 755 INJECTION, SOLUTION INTRAVENOUS at 16:00

## 2017-10-26 RX ADMIN — GABAPENTIN 300 MG: 300 CAPSULE ORAL at 15:33

## 2017-10-26 RX ADMIN — ENOXAPARIN SODIUM 40 MG: 40 INJECTION SUBCUTANEOUS at 18:39

## 2017-10-26 RX ADMIN — OXYCODONE HYDROCHLORIDE AND ACETAMINOPHEN 1 TABLET: 7.5; 325 TABLET ORAL at 15:33

## 2017-10-26 RX ADMIN — BUSPIRONE HYDROCHLORIDE 10 MG: 10 TABLET ORAL at 09:36

## 2017-10-26 RX ADMIN — DOXEPIN HYDROCHLORIDE 10 MG: 10 CAPSULE ORAL at 21:04

## 2017-10-26 RX ADMIN — CLONIDINE HYDROCHLORIDE 0.1 MG: 0.1 TABLET ORAL at 18:39

## 2017-10-26 RX ADMIN — GABAPENTIN 300 MG: 300 CAPSULE ORAL at 09:36

## 2017-10-26 RX ADMIN — ATENOLOL 50 MG: 50 TABLET ORAL at 09:36

## 2017-10-26 NOTE — H&P (VIEW-ONLY)
"    Subjective:     Encounter Date:10/25/2017      Patient ID: Akua Aguilar is a 57 y.o. female.    Chief Complaint:  Chest Pain    This is a new problem. The current episode started in the past 7 days. The onset quality is sudden. The problem occurs intermittently. The pain is at a severity of 0/10. The pain is moderate. The quality of the pain is described as dull. The pain radiates to the left arm. Associated symptoms include abdominal pain, a cough, nausea and shortness of breath. Pertinent negatives include no back pain, dizziness, fever, headaches, hemoptysis, malaise/fatigue, near-syncope, orthopnea, palpitations, PND, syncope or vomiting.   Abdominal Pain   This is a new problem. The current episode started 1 to 4 weeks ago. The onset quality is gradual. The problem occurs constantly. The problem has been gradually worsening. The pain is located in the RUQ. The pain is at a severity of 4/10. The pain is mild. The quality of the pain is aching. Associated symptoms include nausea. Pertinent negatives include no constipation, diarrhea, fever, headaches, hematuria, melena, vomiting or weight loss.   Shortness of Breath   This is a chronic problem. The current episode started more than 1 year ago. The problem occurs constantly. The problem has been rapidly worsening. Associated symptoms include abdominal pain, chest pain and leg swelling. Pertinent negatives include no fever, headaches, hemoptysis, orthopnea, PND, rash, syncope or vomiting. The symptoms are aggravated by any activity. Risk factors include smoking.     Patient presents today to office for follow up from being discharged from [facility] yesterday. Patient developed chest pain Sunday night and presented to [facility]. She states pain was some of the worse she has ever had and states \"I knew I had a heart attack.\" Reports pain was associated with worsened shortness of breath and radiating to left arm. Patient states she was transferred to " "our ER to see cardiologist but didn't get to see one. Patient states she had upper respiratory issues a few weeks ago with a cough. Patient states she also has had pain in her right upper quadrant with swelling and tightness. Patient has a history of nonobstructive coronary artery disease with 30% stenosis to LAD on a cath in 2016. Patient states she cannot tolerate a stress echo and had \"a deathly reaction to dobutamine.\" She did have a Lexiscan prior to her cath in 2016 which was positive for ischemia. Patient complains of occasional swelling on legs. Chronic shortness of breath that is worsening. Patient does continue to smoke. Patient states she has been out of some of her medications for some time, including her clonidine.     The following portions of the patient's history were reviewed and updated as appropriate: allergies, current medications, past family history, past medical history, past social history, past surgical history and problem list.   Prior to Admission medications    Medication Sig Start Date End Date Taking? Authorizing Provider   atenolol (TENORMIN) 50 MG tablet TAKE 1 TABLET BY MOUTH DAILY 8/4/17  Yes Robert Padilla MD   busPIRone (BUSPAR) 10 MG tablet Take 10 mg by mouth Daily.   Yes Historical Provider, MD   CloNIDine (CATAPRES) 0.1 MG tablet Take 1 tablet by mouth 2 (Two) Times a Day. Needs appt for further refills 8/7/17  Yes Robert Padilla MD   doxepin (SINEquan) 10 MG capsule Take 10 mg by mouth Every Night.   Yes Historical Provider, MD   gabapentin (NEURONTIN) 300 MG capsule Take 300 mg by mouth 3 (Three) Times a Day.   Yes Historical Provider, MD   gabapentin (NEURONTIN) 600 MG tablet Take 600 mg by mouth Every Night.   Yes Historical Provider, MD   hydrochlorothiazide (HYDRODIURIL) 25 MG tablet TAKE 1 TABLET BY MOUTH DAILY 11/9/16  Yes Robert Padilla MD   HYDROcodone-acetaminophen (NORCO)  MG per tablet Every 6 (Six) Hours.   Yes Historical Provider, MD   lisinopril " (PRINIVIL,ZESTRIL) 40 MG tablet 1 (ONE) TABLET, ORAL, TWO TIMES DAILY 3/24/17  Yes Robert Padilla MD   MethylPREDNISolone (MEDROL, KENN,) 4 MG tablet Take as directed on package instructions. 10/24/17  Yes Imtiaz Felix Jr., MD   nitroglycerin (NITROSTAT) 0.4 MG SL tablet Place 0.4 mg under the tongue every 5 minutes as needed.     Yes Historical Provider, MD   omeprazole (priLOSEC) 20 MG capsule Take 20 mg by mouth daily   Yes Historical Provider, MD   oxyCODONE-acetaminophen (PERCOCET) 7.5-325 MG per tablet Take 1 tablet by mouth Every 4 (Four) Hours As Needed for Moderate Pain . 10/24/17  Yes Imtiaz Felix Jr., MD   diazePAM (VALIUM) 10 MG tablet 5 mg Every 6 (Six) Hours.  10/25/17  Historical Provider, MD   doxepin (SINEquan) 100 MG capsule Take 100 mg by mouth nightly  10/25/17  Historical Provider, MD   estrogens, conjugated, (PREMARIN) 0.625 MG tablet Take 0.625 mg by mouth daily.  10/25/17  Historical Provider, MD   hydrochlorothiazide (HYDRODIURIL) 25 MG tablet Take 25 mg by mouth daily  10/25/17  Historical Provider, MD   lisinopril (PRINIVIL,ZESTRIL) 20 MG tablet Take 1 tablet by mouth 2 times daily. 2/6/13 10/25/17  Historical Provider, MD   SUMAtriptan (IMITREX) 100 MG tablet Take 100 mg by mouth once as needed for Migraine  10/25/17  Historical Provider, MD     Past Medical History:   Diagnosis Date   • COPD (chronic obstructive pulmonary disease)    • Hypertension    • Low back pain potentially associated with spinal stenosis    • Lupus    • Scoliosis        Review of Systems   Constitution: Negative for chills, decreased appetite, fever, malaise/fatigue, weight gain and weight loss.   HENT: Negative for nosebleeds.    Eyes: Negative for visual disturbance.   Cardiovascular: Positive for chest pain, dyspnea on exertion and leg swelling. Negative for near-syncope, orthopnea, palpitations, paroxysmal nocturnal dyspnea and syncope.   Respiratory: Positive for cough and shortness of breath. Negative  "for hemoptysis and snoring.    Endocrine: Negative for cold intolerance and heat intolerance.   Hematologic/Lymphatic: Negative for bleeding problem. Does not bruise/bleed easily.   Skin: Negative for rash.   Musculoskeletal: Negative for back pain and falls.   Gastrointestinal: Positive for bloating, abdominal pain and nausea. Negative for constipation, diarrhea, heartburn, melena and vomiting.   Genitourinary: Negative for hematuria.   Neurological: Negative for dizziness, headaches and light-headedness.   Psychiatric/Behavioral: Negative for altered mental status.   Allergic/Immunologic: Negative for persistent infections.         ECG 12 Lead  Date/Time: 10/25/2017 11:01 AM  Performed by: LUCAS MUÑOZ  Authorized by: LUCAS MUÑOZ   Comparison: compared with previous ECG from 11/22/2016  Similar to previous ECG  Rhythm: sinus rhythm  Conduction: incomplete LBBB               Objective:     Physical Exam   Constitutional: She is oriented to person, place, and time. She appears well-developed and well-nourished.   HENT:   Head: Normocephalic and atraumatic.   Eyes: Pupils are equal, round, and reactive to light.   Neck: Normal range of motion. Neck supple. No JVD present. Carotid bruit is not present.   Cardiovascular: Normal rate, regular rhythm, normal heart sounds and intact distal pulses.    Trace edema to lower legs   Pulmonary/Chest: Effort normal. She has wheezes.   Abdominal: Soft. Bowel sounds are normal. She exhibits distension. There is tenderness.   Musculoskeletal: Normal range of motion.   Neurological: She is alert and oriented to person, place, and time. She has normal reflexes.   Skin: Skin is warm and dry.   Psychiatric: She has a normal mood and affect. Her behavior is normal. Judgment and thought content normal.     Blood pressure 128/82, pulse 92, resp. rate 18, height 63\" (160 cm), weight 177 lb (80.3 kg)    Lab Review:       Assessment:          Diagnosis Plan   1. Shortness of breath   "   2. Precordial pain     3. Right upper quadrant abdominal pain     4. Leg swelling     5. Nonobstructive atherosclerosis of coronary artery     6. Essential hypertension     7. Mixed hyperlipidemia     8. Tobacco abuse            Plan:       Discussed with Dr. Padilla, Will admit  CBC, CMP, D-Dimer, Amylase, Lipase, Troponin, Lipid Panel, BNP  Chest X-Ray  CT of Abdomen and Pelvis  2D Echo  Will possibly need ischemic workup   NPO  Smoking Cessation  Monitor Telemetry  Daily Weights  I&Os

## 2017-10-26 NOTE — PLAN OF CARE
Problem: Patient Care Overview (Adult)  Goal: Plan of Care Review  Outcome: Ongoing (interventions implemented as appropriate)    10/25/17 1426 10/25/17 2000 10/26/17 0226   Coping/Psychosocial Response Interventions   Plan Of Care Reviewed With --  patient --    Patient Care Overview   Progress no change --  --    Outcome Evaluation   Outcome Summary/Follow up Plan --  --  VSS. Pt c/o leg pain. Resumed neurotin and given pain medication with good result. Pt leaves the floor frequently to smoke and refused the nicotine patch. Pt has been advised not to the leave the floor to smoke. Will continue to monitor and notify MD of changes.       Goal: Adult Individualization and Mutuality  Outcome: Ongoing (interventions implemented as appropriate)    Problem: Acute Coronary Syndrome (ACS) (Adult)  Goal: Signs and Symptoms of Listed Potential Problems Will be Absent or Manageable (Acute Coronary Syndrome)  Outcome: Ongoing (interventions implemented as appropriate)    Problem: Pain, Chronic (Adult)  Goal: Identify Related Risk Factors and Signs and Symptoms  Outcome: Ongoing (interventions implemented as appropriate)  Goal: Acceptable Pain Control/Comfort Level  Outcome: Ongoing (interventions implemented as appropriate)

## 2017-10-26 NOTE — PROGRESS NOTES
[facility] -  Progress Note     LOS: 0 days   Patient Care Team:  Remy Alcaraz MD as PCP - General  Remy Alcaraz MD as PCP - Family Medicine  Robert Padilla MD as Cardiologist (Cardiology)    Chief Complaint: shortness of breath    Reason for consultation: shortness of breath     Subjective     Interval History:   Pt reports she continues with exertional dyspnea. She has been going down to smoke 3 times today. She denies any further chest pain. Her leg swelling is improved. She reports continued abdominal swelling of RUQ. She also tells me she was told by her optometrist recently she needed to have her liver evaluated due to jaundice of her eyes.         Review of Systems:   Review of Systems   Constitution: Negative for malaise/fatigue and weight gain.   Cardiovascular: Positive for dyspnea on exertion. Negative for chest pain, claudication, irregular heartbeat, leg swelling, near-syncope, orthopnea, palpitations, paroxysmal nocturnal dyspnea and syncope.   Respiratory: Negative for hemoptysis and shortness of breath.    Hematologic/Lymphatic: Negative for bleeding problem.   Gastrointestinal: Positive for bloating. Negative for melena, nausea and vomiting.   Genitourinary: Negative for hematuria.   Neurological: Negative for dizziness, focal weakness and light-headedness.   All other systems reviewed and are negative.       Objective     Vital Sign Min/Max for last 24 hours  Temp  Min: 96.2 °F (35.7 °C)  Max: 98 °F (36.7 °C)   BP  Min: 99/64  Max: 140/66   Pulse  Min: 63  Max: 91   Resp  Min: 18  Max: 20   SpO2  Min: 94 %  Max: 98 %   No Data Recorded   No Data Recorded     Last Weight    10/25/17  1207   Weight: 177 lb (80.3 kg)         Intake/Output Summary (Last 24 hours) at 10/26/17 1435  Last data filed at 10/26/17 0800   Gross per 24 hour   Intake                0 ml   Output             1000 ml   Net            -1000 ml         Physical Exam:  Physical Exam   Constitutional:  She is oriented to person, place, and time. She appears well-developed and well-nourished.   HENT:   Head: Normocephalic and atraumatic.   Eyes: Conjunctivae and EOM are normal. Pupils are equal, round, and reactive to light.   Neck: Normal range of motion. Neck supple. No JVD present.   Cardiovascular: Normal rate, regular rhythm, S1 normal, S2 normal, normal heart sounds, intact distal pulses and normal pulses.    No murmur heard.  Pulmonary/Chest: Effort normal. No respiratory distress. She has decreased breath sounds (expiratory BS).   Abdominal: Soft. Bowel sounds are normal. She exhibits no distension.       Musculoskeletal: She exhibits no edema.   Neurological: She is alert and oriented to person, place, and time.   Skin: Skin is warm and dry.   Psychiatric: She has a normal mood and affect. Judgment normal.   Vitals reviewed.       Results Review:   Lab Results (last 72 hours)     Procedure Component Value Units Date/Time    CBC & Differential [[id number]] Collected:  10/25/17 1315    Specimen:  Blood Updated:  10/25/17 1354    Narrative:       The following orders were created for panel order CBC & Differential.  Procedure                               Abnormality         Status                     ---------                               -----------         ------                     CBC Auto Differential[[id number]]        Abnormal            Final result                 Please view results for these tests on the individual orders.    CBC Auto Differential [[id number]]  (Abnormal) Collected:  10/25/17 1315    Specimen:  Blood Updated:  10/25/17 1354     WBC 15.41 (H) 10*3/mm3      RBC 4.85 10*6/mm3      Hemoglobin 14.8 g/dL      Hematocrit 45.5 %      MCV 93.8 fL      MCH 30.5 pg      MCHC 32.5 (L) g/dL      RDW 14.1 %      RDW-SD 48.2 fl      MPV 11.3 fL      Platelets 320 10*3/mm3      Neutrophil % 78.2 (H) %      Lymphocyte % 14.9 (L) %      Monocyte % 6.3 %      Eosinophil % 0.2 %      Basophil % 0.1 %       Immature Grans % 0.3 %      Neutrophils, Absolute 12.07 (H) 10*3/mm3      Lymphocytes, Absolute 2.29 10*3/mm3      Monocytes, Absolute 0.97 10*3/mm3      Eosinophils, Absolute 0.03 10*3/mm3      Basophils, Absolute 0.01 10*3/mm3      Immature Grans, Absolute 0.04 (H) 10*3/mm3     D-dimer, Quantitative [[id number]]  (Abnormal) Collected:  10/25/17 1315    Specimen:  Blood Updated:  10/25/17 1403     D-Dimer, Quantitative 1.12 (H) mg/L (FEU)     Narrative:       Reference Range is 0-0.50 mg/L FEU. However, results <0.50 mg/L FEU tends to rule out DVT or PE. Results >0.50 mg/L FEU are not useful in predicting absence or presence of DVT or PE.    Comprehensive Metabolic Panel [[id number]]  (Abnormal) Collected:  10/25/17 1254    Specimen:  Blood Updated:  10/25/17 1406     Glucose 110 (H) mg/dL      BUN 18 mg/dL      Creatinine 0.57 mg/dL      Sodium 140 mmol/L      Potassium 3.7 mmol/L      Chloride 102 mmol/L      CO2 25.0 mmol/L      Calcium 10.0 mg/dL      Total Protein 7.8 g/dL      Albumin 4.50 g/dL      ALT (SGPT) 29 U/L      AST (SGOT) 21 U/L      Alkaline Phosphatase 85 U/L      Total Bilirubin 0.3 mg/dL      eGFR Non African Amer 109 mL/min/1.73      Globulin 3.3 gm/dL      A/G Ratio 1.4 g/dL      BUN/Creatinine Ratio 31.6 (H)     Anion Gap 13.0 mmol/L     Amylase [[id number]]  (Normal) Collected:  10/25/17 1254    Specimen:  Blood Updated:  10/25/17 1406     Amylase 70 U/L     Lipase [[id number]]  (Normal) Collected:  10/25/17 1254    Specimen:  Blood Updated:  10/25/17 1406     Lipase 47 U/L     Troponin [[id number]]  (Normal) Collected:  10/25/17 1254    Specimen:  Blood Updated:  10/25/17 1421     Troponin I <0.012 ng/mL     CK [[id number]]  (Normal) Collected:  10/25/17 1254    Specimen:  Blood Updated:  10/25/17 1421     Creatine Kinase 48 U/L     CK-MB [[id number]]  (Normal) Collected:  10/25/17 1254    Specimen:  Blood Updated:  10/25/17 1421     CKMB 1.33 ng/mL     Narrative:       CKMB Index not  indicated    BNP [[id number]]  (Normal) Collected:  10/25/17 1254    Specimen:  Blood Updated:  10/25/17 1421     proBNP 800.0 pg/mL     Troponin [[id number]]  (Normal) Collected:  10/25/17 1852    Specimen:  Blood Updated:  10/25/17 1934     Troponin I <0.012 ng/mL     Troponin [[id number]]  (Normal) Collected:  10/25/17 2333    Specimen:  Blood Updated:  10/26/17 0040     Troponin I <0.012 ng/mL     Lipid Panel [[id number]]  (Abnormal) Collected:  10/26/17 0220    Specimen:  Blood Updated:  10/26/17 0254     Total Cholesterol 186 mg/dL      Triglycerides 185 (H) mg/dL      HDL Cholesterol 55 mg/dL      LDL Cholesterol  111 (H) mg/dL      LDL/HDL Ratio 1.71              Echo EF Estimated  Lab Results   Component Value Date    ECHOEFEST 60 10/25/2017         Cath Ejection Fraction Quantitative  No results found for: CATHEF        Medication Review: yes  Current Facility-Administered Medications   Medication Dose Route Frequency Provider Last Rate Last Dose   • atenolol (TENORMIN) tablet 50 mg  50 mg Oral Q24H Alexus Nuñez PA-C   50 mg at 10/26/17 0936   • busPIRone (BUSPAR) tablet 10 mg  10 mg Oral Daily Alexus Nuñez PA-C   10 mg at 10/26/17 0936   • CloNIDine (CATAPRES) tablet 0.1 mg  0.1 mg Oral BID Alexus Nuñez PA-C       • doxepin (SINEquan) capsule 10 mg  10 mg Oral Nightly Alexus Nuñez PA-C   10 mg at 10/25/17 2011   • enoxaparin (LOVENOX) syringe 40 mg  40 mg Subcutaneous Q24H Alexus Nuñez PA-C   40 mg at 10/25/17 1722   • gabapentin (NEURONTIN) capsule 300 mg  300 mg Oral TID Robert Padilla MD   300 mg at 10/26/17 0936   • gabapentin (NEURONTIN) capsule 600 mg  600 mg Oral Nightly Robert Padilla MD   600 mg at 10/25/17 2327   • hydrochlorothiazide (HYDRODIURIL) tablet 25 mg  25 mg Oral Daily Alexus Nuñez PA-C       • HYDROcodone-acetaminophen (NORCO)  MG per tablet 1 tablet  1 tablet Oral Q6H PRN Robert Padilla MD   1 tablet at 10/25/17 1332   • lisinopril (PRINIVIL,ZESTRIL) tablet 40 mg  40 mg Oral  Q24H Alexus Nuñez PA-C   40 mg at 10/26/17 0936   • nicotine (NICODERM CQ) 14 MG/24HR patch 1 patch  1 patch Transdermal Q24H Alexus Nuñez PA-C       • nitroglycerin (NITROSTAT) SL tablet 0.4 mg  0.4 mg Sublingual Q5 Min PRN Robert Padilla MD   0.4 mg at 10/25/17 1225   • oxyCODONE-acetaminophen (PERCOCET) 7.5-325 MG per tablet 1 tablet  1 tablet Oral Q6H PRN Robert Padilla MD   1 tablet at 10/26/17 1044   • pantoprazole (PROTONIX) EC tablet 40 mg  40 mg Oral Q AM Alexus Nuñez PA-C   40 mg at 10/26/17 0553   • sodium chloride 0.9 % flush 1-10 mL  1-10 mL Intravenous PRN Alexus Nuñez PA-C             Assessment/Plan   1. Dyspnea  2. Elevated d-dimer  3. Chest pain: resolved, no further today. Troponin negative x 3, electrocardiogram with no significant STT changes  4. Right sided abdominal swelling  5. Leukocytosis of unknown significance  6. Hyperlipidemia  7. Nonobstructive coronary artery disease  8. Tobacco abuse: 3 cigarettes daily while in hospital per patient report  9. Chronic obstructive pulmonary disease    Plan   1. Liver ultrasound given noted RUQ swelling and patient report of scleral icterus by her optometrist   2. CTA chest given elevated d-dimer and continued dyspnea  3. Continue other present therapy  4. Discontinue IV diuretics; resume home HCTZ.   5. Possible home tomorrow if above is unrevealing.   6. Consider ischemic workup in future, given recurrent chest pain particularly with exertion. Patient reports she cannot tolerate stress echo or dobutamine. Just had lexiscan 1 year ago.   7. Discussed tobacco abuse cessation- patient uninterested in quitting    Alexus Nuñez PA-C  10/26/17  2:35 PM

## 2017-10-27 ENCOUNTER — APPOINTMENT (OUTPATIENT)
Dept: ULTRASOUND IMAGING | Facility: HOSPITAL | Age: 57
End: 2017-10-27

## 2017-10-27 ENCOUNTER — APPOINTMENT (OUTPATIENT)
Dept: GENERAL RADIOLOGY | Facility: HOSPITAL | Age: 57
End: 2017-10-27

## 2017-10-27 VITALS
OXYGEN SATURATION: 95 % | DIASTOLIC BLOOD PRESSURE: 59 MMHG | RESPIRATION RATE: 18 BRPM | SYSTOLIC BLOOD PRESSURE: 117 MMHG | BODY MASS INDEX: 32.79 KG/M2 | HEIGHT: 62 IN | WEIGHT: 178.19 LBS | TEMPERATURE: 96.9 F | HEART RATE: 87 BPM

## 2017-10-27 LAB
BASOPHILS # BLD AUTO: 0.01 10*3/MM3 (ref 0–0.2)
BASOPHILS NFR BLD AUTO: 0.1 % (ref 0–2)
DEPRECATED RDW RBC AUTO: 49.2 FL (ref 40–54)
EOSINOPHIL # BLD AUTO: 0.2 10*3/MM3 (ref 0–0.7)
EOSINOPHIL NFR BLD AUTO: 2.5 % (ref 0–4)
ERYTHROCYTE [DISTWIDTH] IN BLOOD BY AUTOMATED COUNT: 14.2 % (ref 12–15)
HCT VFR BLD AUTO: 46.2 % (ref 37–47)
HGB BLD-MCNC: 14.6 G/DL (ref 12–16)
IMM GRANULOCYTES # BLD: 0.03 10*3/MM3 (ref 0–0.03)
IMM GRANULOCYTES NFR BLD: 0.4 % (ref 0–5)
LYMPHOCYTES # BLD AUTO: 3.25 10*3/MM3 (ref 0.72–4.86)
LYMPHOCYTES NFR BLD AUTO: 40.5 % (ref 15–45)
MCH RBC QN AUTO: 30 PG (ref 28–32)
MCHC RBC AUTO-ENTMCNC: 31.6 G/DL (ref 33–36)
MCV RBC AUTO: 95.1 FL (ref 82–98)
MONOCYTES # BLD AUTO: 0.69 10*3/MM3 (ref 0.19–1.3)
MONOCYTES NFR BLD AUTO: 8.6 % (ref 4–12)
NEUTROPHILS # BLD AUTO: 3.84 10*3/MM3 (ref 1.87–8.4)
NEUTROPHILS NFR BLD AUTO: 47.9 % (ref 39–78)
PLATELET # BLD AUTO: 289 10*3/MM3 (ref 130–400)
PMV BLD AUTO: 10.8 FL (ref 6–12)
RBC # BLD AUTO: 4.86 10*6/MM3 (ref 4.2–5.4)
WBC NRBC COR # BLD: 8.02 10*3/MM3 (ref 4.8–10.8)

## 2017-10-27 PROCEDURE — 99217 PR OBSERVATION CARE DISCHARGE MANAGEMENT: CPT | Performed by: INTERNAL MEDICINE

## 2017-10-27 PROCEDURE — 93970 EXTREMITY STUDY: CPT | Performed by: SURGERY

## 2017-10-27 PROCEDURE — 25010000002 ENOXAPARIN PER 10 MG: Performed by: PHYSICIAN ASSISTANT

## 2017-10-27 PROCEDURE — 93970 EXTREMITY STUDY: CPT

## 2017-10-27 PROCEDURE — 76705 ECHO EXAM OF ABDOMEN: CPT

## 2017-10-27 PROCEDURE — 74220 X-RAY XM ESOPHAGUS 1CNTRST: CPT

## 2017-10-27 PROCEDURE — 96372 THER/PROPH/DIAG INJ SC/IM: CPT

## 2017-10-27 PROCEDURE — G0378 HOSPITAL OBSERVATION PER HR: HCPCS

## 2017-10-27 RX ORDER — RANITIDINE 150 MG/1
150 TABLET ORAL 2 TIMES DAILY
Qty: 60 TABLET | Refills: 0 | Status: SHIPPED | OUTPATIENT
Start: 2017-10-27 | End: 2018-01-21 | Stop reason: SDUPTHER

## 2017-10-27 RX ORDER — ATENOLOL 50 MG/1
50 TABLET ORAL
Qty: 30 TABLET | Refills: 5 | Status: SHIPPED | OUTPATIENT
Start: 2017-10-28 | End: 2018-04-27 | Stop reason: SDUPTHER

## 2017-10-27 RX ORDER — AMLODIPINE BESYLATE 5 MG/1
5 TABLET ORAL
Qty: 30 TABLET | Refills: 5 | Status: SHIPPED | OUTPATIENT
Start: 2017-10-28 | End: 2018-04-27 | Stop reason: SDUPTHER

## 2017-10-27 RX ORDER — NITROGLYCERIN 0.4 MG/1
TABLET SUBLINGUAL
Qty: 30 TABLET | Refills: 1 | Status: SHIPPED | OUTPATIENT
Start: 2017-10-27 | End: 2018-11-30 | Stop reason: SDUPTHER

## 2017-10-27 RX ORDER — ATENOLOL 50 MG/1
TABLET ORAL
Qty: 30 TABLET | Refills: 11 | Status: SHIPPED | OUTPATIENT
Start: 2017-10-27 | End: 2017-12-01 | Stop reason: SDUPTHER

## 2017-10-27 RX ORDER — CLONIDINE HYDROCHLORIDE 0.1 MG/1
0.1 TABLET ORAL 2 TIMES DAILY
Qty: 60 TABLET | Refills: 0 | Status: SHIPPED | OUTPATIENT
Start: 2017-10-27 | End: 2017-12-01 | Stop reason: SDUPTHER

## 2017-10-27 RX ORDER — HYDROCHLOROTHIAZIDE 25 MG/1
TABLET ORAL
Qty: 30 TABLET | Refills: 5 | Status: ON HOLD | OUTPATIENT
Start: 2017-10-27 | End: 2018-11-14 | Stop reason: SDUPTHER

## 2017-10-27 RX ORDER — LISINOPRIL 40 MG/1
TABLET ORAL
Qty: 60 TABLET | Refills: 5 | Status: SHIPPED | OUTPATIENT
Start: 2017-10-27 | End: 2018-05-29 | Stop reason: SDUPTHER

## 2017-10-27 RX ADMIN — CLONIDINE HYDROCHLORIDE 0.1 MG: 0.1 TABLET ORAL at 10:50

## 2017-10-27 RX ADMIN — OXYCODONE HYDROCHLORIDE AND ACETAMINOPHEN 1 TABLET: 7.5; 325 TABLET ORAL at 06:17

## 2017-10-27 RX ADMIN — BARIUM SULFATE 120 ML: 980 POWDER, FOR SUSPENSION ORAL at 09:15

## 2017-10-27 RX ADMIN — HYDROCHLOROTHIAZIDE 25 MG: 25 TABLET ORAL at 10:45

## 2017-10-27 RX ADMIN — ENOXAPARIN SODIUM 40 MG: 40 INJECTION SUBCUTANEOUS at 14:09

## 2017-10-27 RX ADMIN — BUSPIRONE HYDROCHLORIDE 10 MG: 10 TABLET ORAL at 10:45

## 2017-10-27 RX ADMIN — GABAPENTIN 300 MG: 300 CAPSULE ORAL at 14:09

## 2017-10-27 RX ADMIN — LISINOPRIL 40 MG: 20 TABLET ORAL at 10:45

## 2017-10-27 RX ADMIN — PANTOPRAZOLE SODIUM 40 MG: 40 TABLET, DELAYED RELEASE ORAL at 10:45

## 2017-10-27 RX ADMIN — ATENOLOL 50 MG: 50 TABLET ORAL at 12:17

## 2017-10-27 RX ADMIN — GABAPENTIN 300 MG: 300 CAPSULE ORAL at 10:46

## 2017-10-27 RX ADMIN — HYDROCODONE BITARTRATE AND ACETAMINOPHEN 1 TABLET: 10; 325 TABLET ORAL at 12:17

## 2017-10-27 RX ADMIN — BARIUM SULFATE 240 ML: 960 POWDER, FOR SUSPENSION ORAL at 09:15

## 2017-10-27 NOTE — DISCHARGE SUMMARY
[facility] DISCHARGE    Date of Discharge:  10/27/2017    Discharge Diagnosis: noncardiac chest pain, gastroesophageal reflux disease, dyspnea, RUQ swelling    Presenting Problem/History of Present Illness  Dyspnea [R06.00]  Chest pain  RUQ swelling    Hospital Course  Patient is a 57 y.o. female with history of chronic obstructive pulmonary disease, nonobstructive coronary artery disease, hypertension, hyperlipidemia, tobacco abuse who presented to [facility] as a direct admit from our office for recurring chest pain, RUQ swelling, leg swelling and dyspnea. The patient had had cardiac cath in 2016 which showed 30% stenosis of left anterior descending artery. She had had lexiscan prior to this which was positive for ischemia. She refused possibility of stress echo or dobutamine stress echo. The patient had no further chest pain upon admission. Her troponins and electrocardiogram was unrevealing.  She continued with chronic dyspnea. Her leg swelling reduced with diuresis. She was quickly transitioned to oral diuretic and continued to do well. The patient had CTA of chest which showed nonobstructive right nephrolithiasis and otherwise no acute process. Her chest x-ray was notable for COPD appearance with no active disease. The patient's liver ultrasound showed hepatic steatosis. Venous doppler of lower extremities was negative. She also had a barium swallow which showed gastroesophageal reflux disease. Her echo showed LVEF 60% with mild pulmonary hypertension. The patient's labs, vitals and telemetry were stable. Of note, the patient was off the floor smoking often during her hospital stay.     Consults:   Consults     No orders found from 9/26/2017 to 10/26/2017.        Echo EF Estimated  Lab Results   Component Value Date    ECHOEFEST 60 10/25/2017     Condition on Discharge:  Stable, no acute distress    Physical Exam at Discharge  Const: aaox3, no acute distress  Heart: NRR, no m,g,r  Chest: Lungs  with decreased BS bilaterally  Abd: bsx4, mild swelling of RUQ  Ext: no edema, pulses +2    Vital Signs  Temp:  [96.1 °F (35.6 °C)-98.5 °F (36.9 °C)] 96.9 °F (36.1 °C)  Heart Rate:  [65-93] 87  Resp:  [16-20] 18  BP: (117-150)/() 117/59    Discharge Disposition  Home or Self CareHome    Discharge Medications   Akua Aguilar YANNA   Home Medication Instructions DESTINY:[id number]    Printed on:10/27/17 0227   Medication Information                      amLODIPine (NORVASC) 5 MG tablet  Take 1 tablet by mouth Daily.             atenolol (TENORMIN) 50 MG tablet  TAKE 1 TABLET BY MOUTH DAILY             busPIRone (BUSPAR) 10 MG tablet  Take 10 mg by mouth Daily.             CloNIDine (CATAPRES) 0.1 MG tablet  Take 1 tablet by mouth 2 (Two) Times a Day. Needs appt for further refills             doxepin (SINEquan) 10 MG capsule  Take 10 mg by mouth Every Night.             gabapentin (NEURONTIN) 300 MG capsule  Take 300 mg by mouth 3 (Three) Times a Day.             gabapentin (NEURONTIN) 600 MG tablet  Take 600 mg by mouth Every Night.             hydrochlorothiazide (HYDRODIURIL) 25 MG tablet  TAKE 1 TABLET BY MOUTH DAILY             HYDROcodone-acetaminophen (NORCO)  MG per tablet  Every 6 (Six) Hours.             lisinopril (PRINIVIL,ZESTRIL) 40 MG tablet  1 (ONE) TABLET, ORAL, TWO TIMES DAILY             MethylPREDNISolone (MEDROL, KENN,) 4 MG tablet  Take as directed on package instructions.             nitroglycerin (NITROSTAT) 0.4 MG SL tablet  Place 0.4 mg under the tongue every 5 minutes as needed.               omeprazole (priLOSEC) 20 MG capsule  Take 20 mg by mouth daily             oxyCODONE-acetaminophen (PERCOCET) 7.5-325 MG per tablet  Take 1 tablet by mouth Every 4 (Four) Hours As Needed for Moderate Pain .             raNITIdine (ZANTAC) 150 MG tablet  Take 1 tablet by mouth 2 (Two) Times a Day.  AS NEEDED               Discharge Diet: as below    Activity at Discharge: as below    Follow-up  Appointments  No future appointments.      Test Results Pending at Discharge  None    Discharge Instructions  Activity: gradually resume normal activities  Diet: Cardiac diet  Medications: Take all medications as prescribed  Follow-up: Follow-up with primary care provider for noncardiac chest pain and abdominal swelling, as well as COPD in 1 week; Dr. Padilla in 4 weeks  Other: Stop smoking.     Alexus Nuñez PA-C  10/27/17  2:38 PM

## 2017-10-27 NOTE — PLAN OF CARE
Problem: Patient Care Overview (Adult)  Goal: Plan of Care Review  Outcome: Ongoing (interventions implemented as appropriate)    10/27/17 0624   Coping/Psychosocial Response Interventions   Plan Of Care Reviewed With patient   Patient Care Overview   Progress no change   Outcome Evaluation   Outcome Summary/Follow up Plan VSS. Pt up ad varsha in hallway.  Sob on exertion.  Pt has multiple procedures for today. Pt Npo except sip with pain med. Pt c/o back pain.   Pain meds given with good results. Continue to moniitor pt closely. No acute distress noted this shift.        Goal: Adult Individualization and Mutuality  Outcome: Ongoing (interventions implemented as appropriate)  Goal: Discharge Needs Assessment  Outcome: Ongoing (interventions implemented as appropriate)    Problem: Acute Coronary Syndrome (ACS) (Adult)  Goal: Signs and Symptoms of Listed Potential Problems Will be Absent or Manageable (Acute Coronary Syndrome)  Outcome: Ongoing (interventions implemented as appropriate)    Problem: Pain, Chronic (Adult)  Goal: Identify Related Risk Factors and Signs and Symptoms  Outcome: Ongoing (interventions implemented as appropriate)  Goal: Acceptable Pain Control/Comfort Level  Outcome: Ongoing (interventions implemented as appropriate)

## 2017-10-27 NOTE — DISCHARGE INSTRUCTIONS
Activity: gradually resume normal activities  Diet: Cardiac diet  Medications: Take all medications as prescribed  Follow-up: Follow-up with primary care provider for noncardiac chest pain and abdominal swelling, as well as COPD in 1 week; Dr. Padilla in 4 weeks  Other: Stop smoking.

## 2017-11-22 RX ORDER — RANITIDINE 150 MG/1
TABLET ORAL
Qty: 60 TABLET | Refills: 0 | OUTPATIENT
Start: 2017-11-22

## 2017-12-01 ENCOUNTER — OFFICE VISIT (OUTPATIENT)
Dept: CARDIOLOGY | Facility: CLINIC | Age: 57
End: 2017-12-01

## 2017-12-01 ENCOUNTER — TELEPHONE (OUTPATIENT)
Dept: UROLOGY | Facility: CLINIC | Age: 57
End: 2017-12-01

## 2017-12-01 VITALS
HEIGHT: 62 IN | OXYGEN SATURATION: 95 % | HEART RATE: 72 BPM | DIASTOLIC BLOOD PRESSURE: 82 MMHG | SYSTOLIC BLOOD PRESSURE: 110 MMHG | WEIGHT: 179 LBS | BODY MASS INDEX: 32.94 KG/M2

## 2017-12-01 DIAGNOSIS — I25.10 NONOBSTRUCTIVE ATHEROSCLEROSIS OF CORONARY ARTERY: ICD-10-CM

## 2017-12-01 DIAGNOSIS — N20.0 KIDNEY STONES: Primary | ICD-10-CM

## 2017-12-01 DIAGNOSIS — R06.09 DYSPNEA ON EXERTION: ICD-10-CM

## 2017-12-01 DIAGNOSIS — R10.11 RIGHT UPPER QUADRANT ABDOMINAL PAIN: ICD-10-CM

## 2017-12-01 DIAGNOSIS — M79.89 LEG SWELLING: ICD-10-CM

## 2017-12-01 DIAGNOSIS — R06.02 SHORTNESS OF BREATH: ICD-10-CM

## 2017-12-01 DIAGNOSIS — N20.0 RENAL STONE: ICD-10-CM

## 2017-12-01 DIAGNOSIS — Z72.0 TOBACCO ABUSE: ICD-10-CM

## 2017-12-01 DIAGNOSIS — E78.2 MIXED HYPERLIPIDEMIA: ICD-10-CM

## 2017-12-01 DIAGNOSIS — I10 ESSENTIAL HYPERTENSION: Primary | ICD-10-CM

## 2017-12-01 DIAGNOSIS — M25.572 ACUTE LEFT ANKLE PAIN: ICD-10-CM

## 2017-12-01 DIAGNOSIS — R07.2 PRECORDIAL PAIN: ICD-10-CM

## 2017-12-01 PROCEDURE — 99214 OFFICE O/P EST MOD 30 MIN: CPT | Performed by: INTERNAL MEDICINE

## 2017-12-01 RX ORDER — CLONIDINE HYDROCHLORIDE 0.1 MG/1
0.1 TABLET ORAL 2 TIMES DAILY
Qty: 60 TABLET | Refills: 11 | Status: SHIPPED | OUTPATIENT
Start: 2017-12-01 | End: 2018-11-15 | Stop reason: SDUPTHER

## 2017-12-01 RX ORDER — BUSPIRONE HYDROCHLORIDE 15 MG/1
TABLET ORAL
Refills: 2 | COMMUNITY
Start: 2017-11-17 | End: 2018-11-13

## 2017-12-01 NOTE — PROGRESS NOTES
Akua Aguilar  [id number]  1960  57 y.o.  female    Referring Provider: Remy Alcaraz MD    Reason for Follow-up Visit:  Routine follow up.  Similar symptoms as during last visit   Moderate exertional shortness of breath on exertion relieved with rest  No significant cough or wheezing  Going on for several months  No palpitations  associated chest pain  No significant pedal edema  No fever or chills  No significant expectoration  No hemoptysis  No presyncope or syncope   Associated  pedal edema  Overall doing well  No excessive shortness of breath  No palpitations  Chest pain with exertion as well as rest, moderate substernal, pressure like. Lasts less than 5 minutes  No diaphoresis  No nausea  No radiation  Precipitated with exertion  Moderate associated dyspnea    Compliant with medications        History of present illness:  Akua Aguilar is a 57 y.o. yo female with history of  Essential Hypertension   who presents today for   Chief Complaint   Patient presents with   • Shortness of Breath     1 mo f/u - pt needing refills today   .    History  Past Medical History:   Diagnosis Date   • COPD (chronic obstructive pulmonary disease)    • GERD (gastroesophageal reflux disease)    • Hypertension    • Low back pain potentially associated with spinal stenosis    • Lupus    • Scoliosis    • Shortness of breath    ,   Past Surgical History:   Procedure Laterality Date   • CARDIAC CATHETERIZATION     • CARDIAC DEFIBRILLATOR PLACEMENT     •  SECTION     • CHOLECYSTECTOMY     • EYE SURGERY      x 2   • HYSTERECTOMY     • LEG SURGERY      bars/bolts placed   ,   Family History   Problem Relation Age of Onset   • Cancer Mother    • Heart disease Mother    • Heart disease Father    • Lupus Sister    • Heart disease Sister    • Heart disease Sister    • No Known Problems Sister    • Hypokalemia Sister    • Alcohol abuse Sister    ,   Social History   Substance Use Topics   • Smoking status: Current  Every Day Smoker     Packs/day: 0.50     Types: Cigarettes   • Smokeless tobacco: Never Used   • Alcohol use No   ,     Medications  Current Outpatient Prescriptions   Medication Sig Dispense Refill   • amLODIPine (NORVASC) 5 MG tablet Take 1 tablet by mouth Daily. 30 tablet 5   • atenolol (TENORMIN) 50 MG tablet Take 1 tablet by mouth Daily. 30 tablet 5   • CloNIDine (CATAPRES) 0.1 MG tablet Take 1 tablet by mouth 2 (Two) Times a Day. Needs appt for further refills 60 tablet 0   • doxepin (SINEquan) 10 MG capsule Take 10 mg by mouth Every Night.     • gabapentin (NEURONTIN) 300 MG capsule Take 300 mg by mouth 3 (Three) Times a Day.     • gabapentin (NEURONTIN) 600 MG tablet Take 600 mg by mouth Every Night.     • hydrochlorothiazide (HYDRODIURIL) 25 MG tablet Take 1 tab by mouth daily 30 tablet 5   • HYDROcodone-acetaminophen (NORCO)  MG per tablet Every 6 (Six) Hours.     • lisinopril (PRINIVIL,ZESTRIL) 40 MG tablet Take 1 tab by mouth twice daily 60 tablet 5   • nitroglycerin (NITROSTAT) 0.4 MG SL tablet Take no more than 3 doses in 15 minutes. 30 tablet 1   • omeprazole (priLOSEC) 20 MG capsule Take 20 mg by mouth daily     • raNITIdine (ZANTAC) 150 MG tablet Take 1 tablet by mouth 2 (Two) Times a Day. 60 tablet 0   • busPIRone (BUSPAR) 15 MG tablet 1 TABLET BY MOUTH THREE TIMES A DAY  2   • MethylPREDNISolone (MEDROL, KENN,) 4 MG tablet Take as directed on package instructions. 1 each 0   • oxyCODONE-acetaminophen (PERCOCET) 7.5-325 MG per tablet Take 1 tablet by mouth Every 4 (Four) Hours As Needed for Moderate Pain . 15 tablet 0     No current facility-administered medications for this visit.        Allergies:  Aspirin and Latex    Review of Systems  Review of Systems   Constitution: Positive for weakness and malaise/fatigue.   HENT: Negative.    Eyes: Negative.    Cardiovascular: Positive for chest pain, dyspnea on exertion and leg swelling. Negative for claudication, cyanosis, irregular heartbeat,  "near-syncope, orthopnea, palpitations, paroxysmal nocturnal dyspnea and syncope.   Respiratory: Negative.    Endocrine: Negative.    Hematologic/Lymphatic: Negative.    Skin: Negative.    Musculoskeletal: Positive for arthritis and back pain.   Gastrointestinal: Negative for anorexia.   Genitourinary: Negative.    Psychiatric/Behavioral: Negative.        Objective     Physical Exam:  /82  Pulse 72  Ht 62\" (157.5 cm)  Wt 179 lb (81.2 kg)  SpO2 95%  BMI 32.74 kg/m2  Physical Exam   Constitutional: She appears well-developed.   HENT:   Head: Normocephalic.   Neck: Normal carotid pulses and no JVD present. No tracheal tenderness present. Carotid bruit is not present. No tracheal deviation and no edema present.   Cardiovascular: Regular rhythm, normal heart sounds and normal pulses.    Pulmonary/Chest: Effort normal. No stridor.   Abdominal: Soft.   Neurological: She is alert. She has normal strength. No cranial nerve deficit or sensory deficit.   Skin: Skin is warm.   Psychiatric: She has a normal mood and affect. Her speech is normal and behavior is normal.       Results Review:     Procedures    Assessment/Plan   Akua was seen today for shortness of breath.    Diagnoses and all orders for this visit:    Essential hypertension    Mixed hyperlipidemia    Shortness of breath    Dyspnea on exertion    Precordial pain    Leg swelling    Nonobstructive atherosclerosis of coronary artery    Tobacco abuse    Renal stone  -     Ambulatory Referral to Urology    Right upper quadrant abdominal pain  -     Ambulatory Referral to Gastroenterology    Acute left ankle pain  -     Ambulatory Referral to Orthopedic Surgery         No significant pedal edema. Compliant with medications and diet. Latest labs and medications reviewed.    Plan:    Low salt cardiac diet.   Relevant printed educational materials given pertinent to above diagnoses   Patient is asked to monitor BP at home or work, several times per month and return " with written values at next office visit.   Refer to urologist for renal stone  Close follow up with you as scheduled.  Intensive factor modifications.  See order list.    Counseled regarding disease appropriate diet, fluid, caffeine, stimulants and sodium intake as well as importance of compliance to diet, exercise and regular follow up.  Avoid NSAIDS and COX2 inhibitors. Use Acetaminophen PRN.  Orders Placed This Encounter   Procedures   • Ambulatory Referral to Urology     Referral Priority:   Routine     Referral Type:   Consultation     Referral Reason:   Specialty Services Required     Requested Specialty:   Urology     Number of Visits Requested:   1   • Ambulatory Referral to Gastroenterology     Referral Priority:   Routine     Referral Type:   Consultation     Referral Reason:   Specialty Services Required     Requested Specialty:   Gastroenterology     Number of Visits Requested:   1   • Ambulatory Referral to Orthopedic Surgery     Referral Priority:   Routine     Referral Type:   Consultation     Referral Reason:   Specialty Services Required     Requested Specialty:   Orthopedic Surgery     Number of Visits Requested:   1          Return in about 6 months (around 6/1/2018).

## 2018-01-22 RX ORDER — RANITIDINE 150 MG/1
TABLET ORAL
Qty: 60 TABLET | Refills: 11 | Status: SHIPPED | OUTPATIENT
Start: 2018-01-22 | End: 2018-02-27

## 2018-01-26 ENCOUNTER — HOSPITAL ENCOUNTER (OUTPATIENT)
Dept: WOMENS IMAGING | Age: 58
Discharge: HOME | End: 2018-01-26
Payer: MEDICAID

## 2018-01-26 DIAGNOSIS — N63.10 BREAST MASS, RIGHT: ICD-10-CM

## 2018-01-26 DIAGNOSIS — N63.10 MASS OF BREAST, RIGHT: ICD-10-CM

## 2018-01-26 PROCEDURE — 76642 ULTRASOUND BREAST LIMITED: CPT

## 2018-01-26 PROCEDURE — G0279 TOMOSYNTHESIS, MAMMO: HCPCS

## 2018-02-19 ENCOUNTER — TRANSCRIBE ORDERS (OUTPATIENT)
Dept: LAB | Facility: HOSPITAL | Age: 58
End: 2018-02-19

## 2018-02-19 ENCOUNTER — HOSPITAL ENCOUNTER (OUTPATIENT)
Dept: ULTRASOUND IMAGING | Facility: HOSPITAL | Age: 58
Discharge: HOME | End: 2018-02-19
Admitting: NURSE PRACTITIONER

## 2018-02-19 DIAGNOSIS — I82.402 ACUTE EMBOLISM AND THROMBOSIS OF DEEP VEIN OF LOWER EXTREMITY, LEFT (HCC): Primary | ICD-10-CM

## 2018-02-19 DIAGNOSIS — I82.402 ACUTE EMBOLISM AND THROMBOSIS OF DEEP VEIN OF LOWER EXTREMITY, LEFT (HCC): ICD-10-CM

## 2018-02-19 PROCEDURE — 93971 EXTREMITY STUDY: CPT

## 2018-02-27 ENCOUNTER — OFFICE VISIT (OUTPATIENT)
Dept: GASTROENTEROLOGY | Facility: CLINIC | Age: 58
End: 2018-02-27

## 2018-02-27 VITALS
HEART RATE: 61 BPM | OXYGEN SATURATION: 98 % | BODY MASS INDEX: 30.83 KG/M2 | SYSTOLIC BLOOD PRESSURE: 98 MMHG | WEIGHT: 174 LBS | DIASTOLIC BLOOD PRESSURE: 68 MMHG | TEMPERATURE: 96.1 F | HEIGHT: 63 IN

## 2018-02-27 DIAGNOSIS — R11.0 NAUSEA: ICD-10-CM

## 2018-02-27 DIAGNOSIS — K76.0 FATTY LIVER: Primary | ICD-10-CM

## 2018-02-27 DIAGNOSIS — R10.11 RUQ PAIN: ICD-10-CM

## 2018-02-27 DIAGNOSIS — Z12.11 COLON CANCER SCREENING: ICD-10-CM

## 2018-02-27 DIAGNOSIS — R10.13 EPIGASTRIC PAIN: ICD-10-CM

## 2018-02-27 DIAGNOSIS — Z72.0 TOBACCO ABUSE: ICD-10-CM

## 2018-02-27 DIAGNOSIS — K30 INDIGESTION: ICD-10-CM

## 2018-02-27 DIAGNOSIS — K21.9 GASTROESOPHAGEAL REFLUX DISEASE, ESOPHAGITIS PRESENCE NOT SPECIFIED: ICD-10-CM

## 2018-02-27 PROCEDURE — 99214 OFFICE O/P EST MOD 30 MIN: CPT | Performed by: NURSE PRACTITIONER

## 2018-02-27 RX ORDER — POLYETHYLENE GLYCOL 3350, SODIUM SULFATE, SODIUM CHLORIDE, POTASSIUM CHLORIDE, ASCORBIC ACID, SODIUM ASCORBATE 7.5-2.691G
KIT ORAL
Qty: 1 EACH | Refills: 0 | COMMUNITY
Start: 2018-02-27 | End: 2018-05-10 | Stop reason: HOSPADM

## 2018-02-27 RX ORDER — PANTOPRAZOLE SODIUM 40 MG/1
40 TABLET, DELAYED RELEASE ORAL DAILY
Qty: 30 TABLET | Refills: 11 | Status: SHIPPED | OUTPATIENT
Start: 2018-02-27 | End: 2019-02-09 | Stop reason: SDUPTHER

## 2018-02-27 RX ORDER — DOXEPIN HYDROCHLORIDE 25 MG/1
CAPSULE ORAL
Refills: 1 | COMMUNITY
Start: 2018-02-03

## 2018-02-27 RX ORDER — SUCRALFATE 1 G/1
1 TABLET ORAL 4 TIMES DAILY
Qty: 120 TABLET | Refills: 1 | Status: SHIPPED | OUTPATIENT
Start: 2018-02-27 | End: 2018-04-27 | Stop reason: SDUPTHER

## 2018-02-27 NOTE — PROGRESS NOTES
Chief Complaint:   Chief Complaint   Patient presents with   • Abdominal Pain     Patient is here today as a new patient stating that she has lumps in her stomach that are sore to touch and she stays bloated and nauseous all the time.         Patient ID: Akua Aguilar is a 57 y.o. female     History of Present Illness:This is a very pleasant 57-year-old female who was referred to our office by Dr. Padilla for complaints of right upper quadrant pain.  The patient initially presented to her family physician's office after having been discharged from [facility] ER the night before.  She presented to [facility] the next day with complaints of chest pain.  She was admitted to [facility] on 10/25/17 by direct admit from Dr. Padilla office.  The patient had complaints of recurring chest pain along with right upper quadrant and shortness of breath.  During the course of the hospitalization but refused a stress echo.  Cardiac enzymes and EKG were found to be normal.  She did continue with chronic dyspnea however she has a history of COPD and smoking.  A CTA of the chest was performed that found no acute process.  Chest x-ray was notable for COPD.  Liver ultrasound showed hepatic steatosis.  Labs were essentially unremarkable.  Liver enzymes were normal.  The patient was discharged home and directed to follow-up in our office for complaints of right upper quadrant pain.    The patient tells me that she thinks that Dr. Jeronimo did an EGD in 1993 that was normal.  She states at the same time she had a colonoscopy which resulted in a colon resection however she cannot remember why.  She does state that it was not cancer.  She states there is no known family history of colon cancer or colon polyps.    Today the patient tells me that she has right upper quadrant pain along with epigastric pain.  She states that she has chronic GERD and indigestion along with nausea.  She states that eating can make this worse.  She states that  she has not been taking any medication except ranitidine 150 mg once a day but this does not help.  She denies any vomiting, dysphagia or hematemesis.  She denies any melena or hematochezia.  She denies any fever or chills.  She denies any unintentional weight loss or loss of appetite.                Past Medical History:   Diagnosis Date   • COPD (chronic obstructive pulmonary disease)    • GERD (gastroesophageal reflux disease)    • Hypertension    • Low back pain potentially associated with spinal stenosis    • Lupus    • Scoliosis    • Shortness of breath        Past Surgical History:   Procedure Laterality Date   • CARDIAC CATHETERIZATION     • CARDIAC DEFIBRILLATOR PLACEMENT     •  SECTION     • CHOLECYSTECTOMY     • COLON RESECTION       or    • COLONOSCOPY     • EYE SURGERY      x 2   • HYSTERECTOMY     • LEG SURGERY      bars/bolts placed   • UPPER GASTROINTESTINAL ENDOSCOPY           Current Outpatient Prescriptions:   •  amLODIPine (NORVASC) 5 MG tablet, Take 1 tablet by mouth Daily., Disp: 30 tablet, Rfl: 5  •  atenolol (TENORMIN) 50 MG tablet, Take 1 tablet by mouth Daily., Disp: 30 tablet, Rfl: 5  •  busPIRone (BUSPAR) 15 MG tablet, 1 TABLET BY MOUTH THREE TIMES A DAY, Disp: , Rfl: 2  •  CloNIDine (CATAPRES) 0.1 MG tablet, Take 1 tablet by mouth 2 (Two) Times a Day. Needs appt for further refills, Disp: 60 tablet, Rfl: 11  •  doxepin (SINEquan) 25 MG capsule, 1 CAPSULE BY MOUTH AT BEDTIME FILL ON OR AFTER 2018, Disp: , Rfl: 1  •  gabapentin (NEURONTIN) 300 MG capsule, Take 300 mg by mouth 3 (Three) Times a Day., Disp: , Rfl:   •  gabapentin (NEURONTIN) 600 MG tablet, Take 600 mg by mouth Every Night., Disp: , Rfl:   •  hydrochlorothiazide (HYDRODIURIL) 25 MG tablet, Take 1 tab by mouth daily, Disp: 30 tablet, Rfl: 5  •  HYDROcodone-acetaminophen (NORCO)  MG per tablet, Every 6 (Six) Hours., Disp: , Rfl:   •  lisinopril (PRINIVIL,ZESTRIL) 40 MG tablet, Take 1 tab by  "mouth twice daily, Disp: 60 tablet, Rfl: 5  •  nitroglycerin (NITROSTAT) 0.4 MG SL tablet, Take no more than 3 doses in 15 minutes., Disp: 30 tablet, Rfl: 1  •  oxyCODONE-acetaminophen (PERCOCET) 7.5-325 MG per tablet, Take 1 tablet by mouth Every 4 (Four) Hours As Needed for Moderate Pain ., Disp: 15 tablet, Rfl: 0  •  MOVIPREP 100 g reconstituted solution powder, Take first container at 6PM  Drink each christoph every 15 minutes until gone, then follow with 16 oz of water.  Repeat in morning as directed, Disp: 1 each, Rfl: 0  •  pantoprazole (PROTONIX) 40 MG EC tablet, Take 1 tablet by mouth Daily., Disp: 30 tablet, Rfl: 11  •  sucralfate (CARAFATE) 1 g tablet, Take 1 tablet by mouth 4 (Four) Times a Day., Disp: 120 tablet, Rfl: 1    Allergies   Allergen Reactions   • Aspirin      Nausea and vomitting   • Latex      Blisters with tape and intolerance with gloves       Social History     Social History   • Marital status: Single     Spouse name: N/A   • Number of children: N/A   • Years of education: N/A     Occupational History   • Not on file.     Social History Main Topics   • Smoking status: Current Every Day Smoker     Packs/day: 0.50     Types: Cigarettes   • Smokeless tobacco: Never Used   • Alcohol use No   • Drug use: No   • Sexual activity: Defer     Other Topics Concern   • Not on file     Social History Narrative       Family History   Problem Relation Age of Onset   • Cancer Mother    • Heart disease Mother    • Heart disease Father    • Lupus Sister    • Heart disease Sister    • Heart disease Sister    • No Known Problems Sister    • Hypokalemia Sister    • Alcohol abuse Sister    • Colon cancer Neg Hx    • Colon polyps Neg Hx        Vitals:    02/27/18 1310   BP: 98/68   Pulse: 61   Temp: 96.1 °F (35.6 °C)   SpO2: 98%   Weight: 78.9 kg (174 lb)   Height: 160 cm (63\")       Review of Systems:    General:    Present -feeling well   Skin:    Not Present-Rash   HEENT:     Not Present-Acute visual changes or " Acute hearing changes   Neck :    Not Present- swollen glands   Genitourinary:      Not Present- burning, frequency, urgency hematuria, dysuria,   Cardiovascular:   Not Present-chest pain, palpitations, or pressure   Respiratory:   Not Present- shortness of breath or cough   Gastrointestinal:  Musculoskeletal:  Neurological:  Psychiatric:   Present as mentioned in the HP    Not Present. Recent gait disturbances.    Not Present-Seizures and weakness in extremities.    Not Present- Anxiety or Depression.       Physical Exam:    General Appearance:    Alert, cooperative, in no acute distress   Psych:    Mood appropriate    Eyes:          conjunctivae and sclerae normal, no   icterus, no pallor   ENMT:    Ears appear intact with no abnormalities noted oral mucosa moist   Neck:   No adenopathy, supple, trachea midline, no thyromegaly, no   carotid bruit, no JVD    Cardiovascular:    Regular rhythm and normal rate, normal S1 and S2, no            murmur, no gallop, no rub, no click   Gastrointestinal:     Inspection normal.  Normal bowel sounds, no masses, no organomegaly, soft round non-tender, non-distended, no guarding, no rebound or tenderness. No hepatosplenomegaly.   Skin:   No bleeding, bruising or rash   Neurologic:   nonfocal       Lab Results   Component Value Date    WBC 8.02 10/26/2017    WBC 15.41 (H) 10/25/2017    WBC 11.95 (H) 11/22/2016    HGB 14.6 10/26/2017    HGB 14.8 10/25/2017    HGB 16.9 (H) 11/22/2016    HCT 46.2 10/26/2017    HCT 45.5 10/25/2017    HCT 50.5 (H) 11/22/2016     10/26/2017     10/25/2017     11/22/2016        Lab Results   Component Value Date     10/25/2017     11/22/2016     04/19/2016    K 3.7 10/25/2017    K 4.3 11/22/2016    K 4.0 04/19/2016     10/25/2017     11/22/2016     04/19/2016    CO2 25.0 10/25/2017    CO2 29.0 11/22/2016    CO2 26 04/19/2016    BUN 18 10/25/2017    BUN 19 11/22/2016    BUN 12 04/19/2016     CREATININE 0.57 10/25/2017    CREATININE 0.56 11/22/2016    CREATININE 0.46 (L) 04/19/2016    BILITOT 0.3 10/25/2017    BILITOT 0.5 11/22/2016    BILITOT 0.5 04/19/2016    ALKPHOS 85 10/25/2017    ALKPHOS 97 11/22/2016    ALKPHOS 81 04/19/2016    ALT 29 10/25/2017    ALT 32 11/22/2016    ALT 18 04/19/2016    AST 21 10/25/2017    AST 33 11/22/2016    AST 28 04/19/2016    GLUCOSE 110 (H) 10/25/2017    GLUCOSE 80 11/22/2016    GLUCOSE 91 04/19/2016       Lab Results   Component Value Date    INR 0.88 (L) 11/22/2016       Body mass index is 30.82 kg/(m^2). Patient's BMI is above normal parameters. Follow-up plan includes:  no follow-up required.    Assessment and Plan:    Akua was seen today for abdominal pain.    Diagnoses and all orders for this visit:    Fatty liver  -     Comprehensive Metabolic Panel    Nausea  -     Case Request; Standing  -     Case Request    Indigestion  -     Case Request; Standing  -     Case Request    Gastroesophageal reflux disease, esophagitis presence not specified  -     Case Request; Standing  -     Case Request EGD    Epigastric pain  -     Case Request; Standing  -     Case Request    RUQ pain  -     Case Request; Standing  -     Case Request    Tobacco abuse  -     Case Request; Standing  -     Case Request    Colon cancer screening  -     Case Request; Standing  -     Case Request    Other orders  -     Implement Anesthesia Orders Day of Procedure; Standing  -     Obtain Informed Consent; Standing  -     Verify bowel prep was successful; Standing  -     MOVIPREP 100 g reconstituted solution powder; Take first container at 6PM  Drink each christoph every 15 minutes until gone, then follow with 16 oz of water.  Repeat in morning as directed  -     pantoprazole (PROTONIX) 40 MG EC tablet; Take 1 tablet by mouth Daily.  -     sucralfate (CARAFATE) 1 g tablet; Take 1 tablet by mouth 4 (Four) Times a Day.    Have scheduled the patient for both an EGD and colonoscopy.  I will initiate Protonix  40 mg once a day along with Carafate.  I will check a CMP to follow-up on the patient's liver enzymes as she was found to have fatty liver however previous liver enzymes were normal.    The risks, benefits, and alternatives of endoscopy were reviewed with the patient today.  Risks including perforation, with or without dilation, possibly requiring surgery.  Additional risks include risk of bleeding.  There is also the risk of a drug reaction or problems with anesthesia.  This will be discussed with the further by the anesthesia team on the day of the procedure. The benefits include the diagnosis and management of disease of the upper digestive tract.  Alternatives to endoscopy include upper GI series, laboratory testing, radiographic evaluation, or no intervention.  The patient verbalizes understanding and agrees.    The risks, benefits, and alternatives of colonoscopy were reviewed with the patient today.  Risks including perforation of the colon possibly requiring surgery or colostomy.  Additional risks include risk of bleeding from biopsies or removal of colon tissue.  There is also the risk of a drug reaction or problems with anesthesia.  This will be discussed with the further by the anesthesia team on the day of the procedure.  Lastly there is a possibility of missing a colon polyp or cancer.  The benefits include the diagnosis and management of disease of the colon and rectum.  Alternatives to colonoscopy include barium enema, laboratory testing, radiographic evaluation, or no intervention.  The patient verbalizes understanding and agrees.      Patient Instructions   Food Choices for Gastroesophageal Reflux Disease, Adult  When you have gastroesophageal reflux disease (GERD), the foods you eat and your eating habits are very important. Choosing the right foods can help ease your discomfort.  What guidelines do I need to follow?  · Choose fruits, vegetables, whole grains, and low-fat dairy products.  · Choose  low-fat meat, fish, and poultry.  · Limit fats such as oils, salad dressings, butter, nuts, and avocado.  · Keep a food diary. This helps you identify foods that cause symptoms.  · Avoid foods that cause symptoms. These may be different for everyone.  · Eat small meals often instead of 3 large meals a day.  · Eat your meals slowly, in a place where you are relaxed.  · Limit fried foods.  · Cook foods using methods other than frying.  · Avoid drinking alcohol.  · Avoid drinking large amounts of liquids with your meals.  · Avoid bending over or lying down until 2-3 hours after eating.  What foods are not recommended?  These are some foods and drinks that may make your symptoms worse:  Vegetables   Tomatoes. Tomato juice. Tomato and spaghetti sauce. Chili peppers. Onion and garlic. Horseradish.  Fruits   Oranges, grapefruit, and lemon (fruit and juice).  Meats   High-fat meats, fish, and poultry. This includes hot dogs, ribs, ham, sausage, salami, and segura.  Dairy   Whole milk and chocolate milk. Sour cream. Cream. Butter. Ice cream. Cream cheese.  Drinks   Coffee and tea. Bubbly (carbonated) drinks or energy drinks.  Condiments   Hot sauce. Barbecue sauce.  Sweets/Desserts   Chocolate and cocoa. Donuts. Peppermint and spearmint.  Fats and Oils   High-fat foods. This includes French fries and potato chips.  Other   Vinegar. Strong spices. This includes black pepper, white pepper, red pepper, cayenne, cisneros powder, cloves, ginger, and chili powder.  The items listed above may not be a complete list of foods and drinks to avoid. Contact your dietitian for more information.   This information is not intended to replace advice given to you by your health care provider. Make sure you discuss any questions you have with your health care provider.  Document Released: 06/18/2013 Document Revised: 05/25/2017 Document Reviewed: 10/22/2014  [employer] Interactive Patient Education © 2017 [employer]    Fatty Liver  Fatty liver,  also called hepatic steatosis or steatohepatitis, is a condition in which too much fat has built up in your liver cells. The liver removes harmful substances from your bloodstream. It produces fluids your body needs. It also helps your body use and store energy from the food you eat.  In many cases, fatty liver does not cause symptoms or problems. It is often diagnosed when tests are being done for other reasons. However, over time, fatty liver can cause inflammation that may lead to more serious liver problems, such as scarring of the liver (cirrhosis).  What are the causes?  Causes of fatty liver may include:  · Drinking too much alcohol.  · Poor nutrition.  · Obesity.  · Cushing syndrome.  · Diabetes.  · Hyperlipidemia.  · Pregnancy.  · Certain drugs.  · Poisons.  · Some viral infections.  What increases the risk?  You may be more likely to develop fatty liver if you:  · Abuse alcohol.  · Are pregnant.  · Are overweight.  · Have diabetes.  · Have hepatitis.  · Have a high triglyceride level.  What are the signs or symptoms?  Fatty liver often does not cause any symptoms. In cases where symptoms develop, they can include:  · Fatigue.  · Weakness.  · Weight loss.  · Confusion.  · Abdominal pain.  · Yellowing of your skin and the white parts of your eyes (jaundice).  · Nausea and vomiting.  How is this diagnosed?  Fatty liver may be diagnosed by:  · Physical exam and medical history.  · Blood tests.  · Imaging tests, such as an ultrasound, CT scan, or MRI.  · Liver biopsy. A small sample of liver tissue is removed using a needle. The sample is then looked at under a microscope.  How is this treated?  Fatty liver is often caused by other health conditions. Treatment for fatty liver may involve medicines and lifestyle changes to manage conditions such as:  · Alcoholism.  · High cholesterol.  · Diabetes.  · Being overweight or obese.  Follow these instructions at home:  · Eat a healthy diet as directed by your health  care provider.  · Exercise regularly. This can help you lose weight and control your cholesterol and diabetes. Talk to your health care provider about an exercise plan and which activities are best for you.  · Do not drink alcohol.  · Take medicines only as directed by your health care provider.  Contact a health care provider if:  You have difficulty controlling your:  · Blood sugar.  · Cholesterol.  · Alcohol consumption.  Get help right away if:  · You have abdominal pain.  · You have jaundice.  · You have nausea and vomiting.  This information is not intended to replace advice given to you by your health care provider. Make sure you discuss any questions you have with your health care provider.  Document Released: 02/02/2007 Document Revised: 05/25/2017 Document Reviewed: 04/29/2015  [employer] Interactive Patient Education © 2017 [employer]          Next follow-up appointment        EMR Dragon/Transcription disclaimer:  Much of this encounter note is an electronic transcription/translation of spoken language to printed text. The electronic translation of spoken language may permit erroneous, or at times, nonsensical words or phrases to be inadvertently transcribed; although I have reviewed the note for such errors, some may still exist.

## 2018-02-27 NOTE — PATIENT INSTRUCTIONS
Food Choices for Gastroesophageal Reflux Disease, Adult  When you have gastroesophageal reflux disease (GERD), the foods you eat and your eating habits are very important. Choosing the right foods can help ease your discomfort.  What guidelines do I need to follow?  · Choose fruits, vegetables, whole grains, and low-fat dairy products.  · Choose low-fat meat, fish, and poultry.  · Limit fats such as oils, salad dressings, butter, nuts, and avocado.  · Keep a food diary. This helps you identify foods that cause symptoms.  · Avoid foods that cause symptoms. These may be different for everyone.  · Eat small meals often instead of 3 large meals a day.  · Eat your meals slowly, in a place where you are relaxed.  · Limit fried foods.  · Cook foods using methods other than frying.  · Avoid drinking alcohol.  · Avoid drinking large amounts of liquids with your meals.  · Avoid bending over or lying down until 2-3 hours after eating.  What foods are not recommended?  These are some foods and drinks that may make your symptoms worse:  Vegetables   Tomatoes. Tomato juice. Tomato and spaghetti sauce. Chili peppers. Onion and garlic. Horseradish.  Fruits   Oranges, grapefruit, and lemon (fruit and juice).  Meats   High-fat meats, fish, and poultry. This includes hot dogs, ribs, ham, sausage, salami, and segura.  Dairy   Whole milk and chocolate milk. Sour cream. Cream. Butter. Ice cream. Cream cheese.  Drinks   Coffee and tea. Bubbly (carbonated) drinks or energy drinks.  Condiments   Hot sauce. Barbecue sauce.  Sweets/Desserts   Chocolate and cocoa. Donuts. Peppermint and spearmint.  Fats and Oils   High-fat foods. This includes French fries and potato chips.  Other   Vinegar. Strong spices. This includes black pepper, white pepper, red pepper, cayenne, cisneros powder, cloves, ginger, and chili powder.  The items listed above may not be a complete list of foods and drinks to avoid. Contact your dietitian for more information.    This information is not intended to replace advice given to you by your health care provider. Make sure you discuss any questions you have with your health care provider.  Document Released: 06/18/2013 Document Revised: 05/25/2017 Document Reviewed: 10/22/2014  [employer] Interactive Patient Education © 2017 [employer]    Fatty Liver  Fatty liver, also called hepatic steatosis or steatohepatitis, is a condition in which too much fat has built up in your liver cells. The liver removes harmful substances from your bloodstream. It produces fluids your body needs. It also helps your body use and store energy from the food you eat.  In many cases, fatty liver does not cause symptoms or problems. It is often diagnosed when tests are being done for other reasons. However, over time, fatty liver can cause inflammation that may lead to more serious liver problems, such as scarring of the liver (cirrhosis).  What are the causes?  Causes of fatty liver may include:  · Drinking too much alcohol.  · Poor nutrition.  · Obesity.  · Cushing syndrome.  · Diabetes.  · Hyperlipidemia.  · Pregnancy.  · Certain drugs.  · Poisons.  · Some viral infections.  What increases the risk?  You may be more likely to develop fatty liver if you:  · Abuse alcohol.  · Are pregnant.  · Are overweight.  · Have diabetes.  · Have hepatitis.  · Have a high triglyceride level.  What are the signs or symptoms?  Fatty liver often does not cause any symptoms. In cases where symptoms develop, they can include:  · Fatigue.  · Weakness.  · Weight loss.  · Confusion.  · Abdominal pain.  · Yellowing of your skin and the white parts of your eyes (jaundice).  · Nausea and vomiting.  How is this diagnosed?  Fatty liver may be diagnosed by:  · Physical exam and medical history.  · Blood tests.  · Imaging tests, such as an ultrasound, CT scan, or MRI.  · Liver biopsy. A small sample of liver tissue is removed using a needle. The sample is then looked at under a  microscope.  How is this treated?  Fatty liver is often caused by other health conditions. Treatment for fatty liver may involve medicines and lifestyle changes to manage conditions such as:  · Alcoholism.  · High cholesterol.  · Diabetes.  · Being overweight or obese.  Follow these instructions at home:  · Eat a healthy diet as directed by your health care provider.  · Exercise regularly. This can help you lose weight and control your cholesterol and diabetes. Talk to your health care provider about an exercise plan and which activities are best for you.  · Do not drink alcohol.  · Take medicines only as directed by your health care provider.  Contact a health care provider if:  You have difficulty controlling your:  · Blood sugar.  · Cholesterol.  · Alcohol consumption.  Get help right away if:  · You have abdominal pain.  · You have jaundice.  · You have nausea and vomiting.  This information is not intended to replace advice given to you by your health care provider. Make sure you discuss any questions you have with your health care provider.  Document Released: 02/02/2007 Document Revised: 05/25/2017 Document Reviewed: 04/29/2015  [employer] Interactive Patient Education © 2017 [employer]

## 2018-03-09 ENCOUNTER — TELEPHONE (OUTPATIENT)
Dept: GASTROENTEROLOGY | Facility: CLINIC | Age: 58
End: 2018-03-09

## 2018-03-09 NOTE — TELEPHONE ENCOUNTER
Sent reminder letter out with copy of labs for CMP that DAVID Goodwin ordered to patient. Estrella Caputo MA 03/09/18

## 2018-03-16 ENCOUNTER — OFFICE VISIT (OUTPATIENT)
Dept: NEUROLOGY | Age: 58
End: 2018-03-16
Payer: MEDICAID

## 2018-03-16 VITALS
DIASTOLIC BLOOD PRESSURE: 69 MMHG | WEIGHT: 175 LBS | HEIGHT: 63 IN | HEART RATE: 87 BPM | BODY MASS INDEX: 31.01 KG/M2 | OXYGEN SATURATION: 96 % | SYSTOLIC BLOOD PRESSURE: 99 MMHG

## 2018-03-16 DIAGNOSIS — G47.00 INSOMNIA, UNSPECIFIED TYPE: ICD-10-CM

## 2018-03-16 DIAGNOSIS — R40.0 SOMNOLENCE, DAYTIME: ICD-10-CM

## 2018-03-16 DIAGNOSIS — G47.33 OBSTRUCTIVE SLEEP APNEA: Primary | ICD-10-CM

## 2018-03-16 DIAGNOSIS — G47.9 SLEEP DISTURBANCE: ICD-10-CM

## 2018-03-16 DIAGNOSIS — R06.83 SNORING: ICD-10-CM

## 2018-03-16 DIAGNOSIS — R06.81 WITNESSED APNEIC SPELLS: ICD-10-CM

## 2018-03-16 PROCEDURE — 3017F COLORECTAL CA SCREEN DOC REV: CPT | Performed by: PHYSICIAN ASSISTANT

## 2018-03-16 PROCEDURE — 4004F PT TOBACCO SCREEN RCVD TLK: CPT | Performed by: PHYSICIAN ASSISTANT

## 2018-03-16 PROCEDURE — 3014F SCREEN MAMMO DOC REV: CPT | Performed by: PHYSICIAN ASSISTANT

## 2018-03-16 PROCEDURE — 99213 OFFICE O/P EST LOW 20 MIN: CPT | Performed by: PHYSICIAN ASSISTANT

## 2018-03-16 PROCEDURE — G8484 FLU IMMUNIZE NO ADMIN: HCPCS | Performed by: PHYSICIAN ASSISTANT

## 2018-03-16 PROCEDURE — G8417 CALC BMI ABV UP PARAM F/U: HCPCS | Performed by: PHYSICIAN ASSISTANT

## 2018-03-16 PROCEDURE — G8427 DOCREV CUR MEDS BY ELIG CLIN: HCPCS | Performed by: PHYSICIAN ASSISTANT

## 2018-03-16 RX ORDER — AMLODIPINE BESYLATE 5 MG/1
5 TABLET ORAL DAILY
Refills: 5 | COMMUNITY
Start: 2018-03-02

## 2018-03-16 RX ORDER — PANTOPRAZOLE SODIUM 40 MG/1
40 TABLET, DELAYED RELEASE ORAL DAILY
Refills: 11 | COMMUNITY
Start: 2018-02-27

## 2018-03-16 RX ORDER — GABAPENTIN 300 MG/1
300 CAPSULE ORAL 2 TIMES DAILY
Refills: 0 | COMMUNITY
Start: 2018-03-05

## 2018-03-16 RX ORDER — ATENOLOL 50 MG/1
50 TABLET ORAL DAILY
Refills: 5 | COMMUNITY
Start: 2018-02-18 | End: 2021-09-03

## 2018-03-16 RX ORDER — SUCRALFATE 1 G/1
1 TABLET ORAL 4 TIMES DAILY
Refills: 1 | COMMUNITY
Start: 2018-02-27 | End: 2019-03-19

## 2018-03-16 RX ORDER — GABAPENTIN 600 MG/1
1.5 TABLET ORAL NIGHTLY
Refills: 0 | COMMUNITY
Start: 2018-03-05

## 2018-03-16 RX ORDER — LISINOPRIL 40 MG/1
40 TABLET ORAL 2 TIMES DAILY
Refills: 5 | COMMUNITY
Start: 2018-02-18

## 2018-03-16 NOTE — PATIENT INSTRUCTIONS
apnea  Testing is usually performed in a sleep laboratory. A full sleep study is called a polysomnogram. The polysomnogram measures the breathing effort and airflow, blood oxygen level, heart rate and rhythm, duration of the various stages of sleep, body position, and movement of the arms/legs. Home monitoring devices are available that can perform a sleep study. This is a reasonable alternative to conventional testing in a sleep laboratory if the clinician strongly suspects moderate or severe sleep apnea and the patient does not have other illnesses or sleep disorders that may interfere with the results. SLEEP APNEA TREATMENT  Sleep apnea is best treated by a knowledgeable sleep medicine specialist. The goal of treatment is to maintain an open airway during sleep. Effective treatment will eliminate the symptoms of sleep disturbance; long-term health consequences are also reduced. Most treatments require nightly use. The challenge for the clinician and the patient is to select an effective therapy that is appropriate for the patient's problem and that is acceptable for long term use. Continuous positive airway pressure (CPAP)  The most effective treatment for sleep apnea uses air pressure from a mechanical device to keep the upper airway open during sleep. A CPAP (continuous positive airway pressure)  device uses an air-tight attachment to the nose, typically a mask, connected to a tube and a blower which generates the pressure. Devices that fit comfortably into the nasal opening, rather than over the nose, are also available. CPAP should be used any time the person sleeps (day or night). The CPAP device is usually used for the first time in the sleep lab, where a technician can adjust the pressure and select the best equipment to keep the airway open.  Alternatively, an auto device with a self-adjusting pressure feature, provided with proper education and training, can get treatment started without another the PAP devices. This is called a compliance download. Your PAP supplier will assist you in this matter. Reminder:  Please bring a copy of the compliance download to your next office visit or have your supplier fax it to our office prior to your office visit. Note:  Where applicable, we will utilize PAP device efficiency reports, additional testing, and face-to-face  clinical evaluation subsequent to any treatment, changes in treatment, and continued treatment. Caution:  Please abstain from driving or engaging in other activities which may be hazardous in the presence of diminished alertness or daytime drowsiness. And avoid the use of sedatives or alcohol, which can worsen sleep apnea and daytime drowsiness. Mask suggestions:  - Resmed Airfit N20 (Nasal) or F20 (Full face mask). They conform to your face, thus decreasing the potential for mask leakage. You might like the [employer] (full face mask). It has a \"memory foam\" like cushion. You might also like to try a nasal mask called a Dreamwear nasal mask or the Dreamwear nasal pillow. One other suggestion is the formerly [facility], it is a minimal contact full face mask. The [employer] incredible under the nose design makes it the only full face mask that won't cause red marks on the bridge of your nose when compared to other Full Face Masks             Sleep Studies: About This Test  What is it? Sleep studies are tests that watch what happens to your body during sleep. These studies usually are done in a sleep lab. Sleep labs are often located in hospitals. But sleep studies also can be done with portable equipment that you use at home. Why is this test done? Sleep studies are done to find sleep problems, including:  · Sleep apnea or excessive snoring. · Narcolepsy. · Nocturnal seizures. · REM behavior disorder (RBD). · Repeated muscle twitching of the feet, arms, or legs while you sleep.   How can you prepare for the test?  · You may be asked to keep a sleep diary for 1 to 2 weeks before your sleep study. · Don't take any naps for 2 to 3 days before your test.  · You may be asked to avoid food or drinks with caffeine for a day or two before your test.  · Take a shower or bath before your test, but don't use sprays, oils, or gels on your hair. Don't wear makeup, fingernail polish, or fake nails. · Pack and take along a small overnight bag with personal items, such as a toothbrush, a comb, favorite pillows or blankets, and a book. You can wear your own nightclothes. What happens during the test?  · In the sleep lab, you will be in a private room, much like a hotel room. · Small pads or patches called electrodes will be placed on your head and body with a small amount of glue and tape. These will record things like brain activity, eye movement, oxygen levels, and snoring. · Soft elastic belts will be placed around your chest and belly to measure your breathing. · Your blood oxygen levels will be checked by a small clip (oximeter) placed either on the tip of your index finger or on your earlobe. · If you have sleep apnea, you may wear a mask that is connected to a continuous positive airway pressure (CPAP) machine. · Depending on the type of test, you will be allowed to sleep through the night or you will be awakened periodically and asked to stay awake for a while. What else should you know about the test?  · It may feel odd to be hooked to the sleep study equipment. The sleep lab technician understands that your sleep may not be the same as it is at home because of the equipment. Try to relax and make yourself as comfortable as possible. · After your sleep problem has been identified, you may need a second study if your doctor orders treatment such as CPAP. · Portable sleep study equipment is available for a person to do sleep studies at home. This may be a choice for people who have problems sleeping in a sleep lab.  But home sleep studies may not give the same results as a sleep lab. How long does the test take? · You will stay in the sleep lab overnight. For some tests, you will also stay part of the next day. What happens after the test?  · You will be able to go home right away. · You can go back to your usual activities right away. Follow-up care is a key part of your treatment and safety. Be sure to make and go to all appointments, and call your doctor if you are having problems. It's also a good idea to keep a list of the medicines you take. Ask your doctor when you can expect to have your test results. Where can you learn more? Go to [email or web address] org and sign in to your [address] account. Enter Z833 in the [employer] box to learn more about \"Sleep Studies: About This Test.\"     If you do not have an account, please click on the \"Sign Up Now\" link. Current as of: May 12, 2017  Content Version: 11.5  © 5756-3040 [employer]. Care instructions adapted under license by [facility] ([facility]). If you have questions about a medical condition or this instruction, always ask your healthcare professional. [address] any warranty or liability for your use of this information. Learning About CPAP for Sleep Apnea  What is CPAP? CPAP is a small machine that you use at home every night while you sleep. It increases air pressure in your throat to keep your airway open. When you have sleep apnea, this can help you sleep better so you feel much better. CPAP stands for \"continuous positive airway pressure. \"  The CPAP machine will have one of the following:  · A mask that covers your nose and mouth  · Prongs that fit into your nose  · A mask that covers your nose only, the most common type. This type is called NCPAP. The N stands for \"nasal.\"  Why is it done? CPAP is usually the best treatment for obstructive sleep apnea. It is the first treatment choice and the most widely used.

## 2018-04-10 ENCOUNTER — HOSPITAL ENCOUNTER (EMERGENCY)
Facility: HOSPITAL | Age: 58
End: 2018-04-10

## 2018-04-10 ENCOUNTER — TRANSCRIBE ORDERS (OUTPATIENT)
Dept: ADMINISTRATIVE | Facility: HOSPITAL | Age: 58
End: 2018-04-10

## 2018-04-10 DIAGNOSIS — M76.829 PTTD (POSTERIOR TIBIAL TENDON DYSFUNCTION): Primary | ICD-10-CM

## 2018-04-13 ENCOUNTER — APPOINTMENT (OUTPATIENT)
Dept: ULTRASOUND IMAGING | Facility: HOSPITAL | Age: 58
End: 2018-04-13
Attending: PODIATRIST

## 2018-04-19 ENCOUNTER — APPOINTMENT (OUTPATIENT)
Dept: ULTRASOUND IMAGING | Facility: HOSPITAL | Age: 58
End: 2018-04-19
Attending: PODIATRIST

## 2018-04-20 ENCOUNTER — TRANSCRIBE ORDERS (OUTPATIENT)
Dept: ADMINISTRATIVE | Facility: HOSPITAL | Age: 58
End: 2018-04-20

## 2018-04-20 ENCOUNTER — LAB (OUTPATIENT)
Dept: LAB | Facility: HOSPITAL | Age: 58
End: 2018-04-20

## 2018-04-20 ENCOUNTER — HOSPITAL ENCOUNTER (OUTPATIENT)
Dept: ULTRASOUND IMAGING | Facility: HOSPITAL | Age: 58
Discharge: HOME | End: 2018-04-20
Attending: PODIATRIST | Admitting: PODIATRIST

## 2018-04-20 DIAGNOSIS — K76.0 FATTY LIVER: ICD-10-CM

## 2018-04-20 DIAGNOSIS — K76.0 FATTY LIVER: Primary | ICD-10-CM

## 2018-04-20 DIAGNOSIS — M76.829 PTTD (POSTERIOR TIBIAL TENDON DYSFUNCTION): ICD-10-CM

## 2018-04-20 LAB
ALBUMIN SERPL-MCNC: 4.4 G/DL (ref 3.5–5)
ALBUMIN/GLOB SERPL: 1.4 G/DL (ref 1.1–2.5)
ALP SERPL-CCNC: 67 U/L (ref 24–120)
ALT SERPL W P-5'-P-CCNC: 23 U/L (ref 0–54)
ANION GAP SERPL CALCULATED.3IONS-SCNC: 8 MMOL/L (ref 4–13)
AST SERPL-CCNC: 20 U/L (ref 7–45)
BILIRUB SERPL-MCNC: 0.4 MG/DL (ref 0.1–1)
BUN BLD-MCNC: 20 MG/DL (ref 5–21)
BUN/CREAT SERPL: 32.3 (ref 7–25)
CALCIUM SPEC-SCNC: 9.7 MG/DL (ref 8.4–10.4)
CHLORIDE SERPL-SCNC: 102 MMOL/L (ref 98–110)
CO2 SERPL-SCNC: 31 MMOL/L (ref 24–31)
CREAT BLD-MCNC: 0.62 MG/DL (ref 0.5–1.4)
GFR SERPL CREATININE-BSD FRML MDRD: 99 ML/MIN/1.73
GLOBULIN UR ELPH-MCNC: 3.2 GM/DL
GLUCOSE BLD-MCNC: 103 MG/DL (ref 70–100)
POTASSIUM BLD-SCNC: 3.8 MMOL/L (ref 3.5–5.3)
PROT SERPL-MCNC: 7.6 G/DL (ref 6.3–8.7)
SODIUM BLD-SCNC: 141 MMOL/L (ref 135–145)

## 2018-04-20 PROCEDURE — 80053 COMPREHEN METABOLIC PANEL: CPT | Performed by: NURSE PRACTITIONER

## 2018-04-20 PROCEDURE — 76882 US LMTD JT/FCL EVL NVASC XTR: CPT

## 2018-04-20 PROCEDURE — 36415 COLL VENOUS BLD VENIPUNCTURE: CPT

## 2018-04-23 ENCOUNTER — TELEPHONE (OUTPATIENT)
Dept: GASTROENTEROLOGY | Facility: CLINIC | Age: 58
End: 2018-04-23

## 2018-04-23 NOTE — TELEPHONE ENCOUNTER
----- Message from DAVID Mills sent at 4/23/2018  2:30 PM CDT -----  Please notify patient CMP was found to be normal.

## 2018-04-27 RX ORDER — SUCRALFATE 1 G/1
1 TABLET ORAL 4 TIMES DAILY
Qty: 120 TABLET | Refills: 1 | Status: SHIPPED | OUTPATIENT
Start: 2018-04-27 | End: 2018-11-13

## 2018-04-27 RX ORDER — ATENOLOL 50 MG/1
50 TABLET ORAL
Qty: 30 TABLET | Refills: 5 | Status: SHIPPED | OUTPATIENT
Start: 2018-04-27 | End: 2018-12-13 | Stop reason: SDUPTHER

## 2018-04-27 RX ORDER — AMLODIPINE BESYLATE 5 MG/1
5 TABLET ORAL
Qty: 30 TABLET | Refills: 5 | Status: SHIPPED | OUTPATIENT
Start: 2018-04-27 | End: 2018-10-20 | Stop reason: SDUPTHER

## 2018-04-27 NOTE — TELEPHONE ENCOUNTER
Refill request for:  Atenolol 50mg PO daily, Quantity; 30, Refills; 5.  Amlodipine 5mg PO daily, Quantity: 30, Refills: 5.  HL

## 2018-05-10 ENCOUNTER — ANESTHESIA (OUTPATIENT)
Dept: GASTROENTEROLOGY | Facility: HOSPITAL | Age: 58
End: 2018-05-10

## 2018-05-10 ENCOUNTER — ANESTHESIA EVENT (OUTPATIENT)
Dept: GASTROENTEROLOGY | Facility: HOSPITAL | Age: 58
End: 2018-05-10

## 2018-05-10 ENCOUNTER — HOSPITAL ENCOUNTER (OUTPATIENT)
Facility: HOSPITAL | Age: 58
Setting detail: HOSPITAL OUTPATIENT SURGERY
Discharge: HOME | End: 2018-05-10
Attending: INTERNAL MEDICINE | Admitting: INTERNAL MEDICINE

## 2018-05-10 VITALS
RESPIRATION RATE: 18 BRPM | WEIGHT: 176 LBS | DIASTOLIC BLOOD PRESSURE: 60 MMHG | SYSTOLIC BLOOD PRESSURE: 100 MMHG | HEART RATE: 63 BPM | OXYGEN SATURATION: 99 % | BODY MASS INDEX: 31.18 KG/M2 | TEMPERATURE: 97.9 F | HEIGHT: 63 IN

## 2018-05-10 PROCEDURE — 25010000002 PROPOFOL 10 MG/ML EMULSION: Performed by: NURSE ANESTHETIST, CERTIFIED REGISTERED

## 2018-05-10 PROCEDURE — C1726 CATH, BAL DIL, NON-VASCULAR: HCPCS

## 2018-05-10 PROCEDURE — G0121 COLON CA SCRN NOT HI RSK IND: HCPCS | Performed by: INTERNAL MEDICINE

## 2018-05-10 PROCEDURE — 43249 ESOPH EGD DILATION <30 MM: CPT | Performed by: INTERNAL MEDICINE

## 2018-05-10 RX ORDER — PROPOFOL 10 MG/ML
VIAL (ML) INTRAVENOUS AS NEEDED
Status: DISCONTINUED | OUTPATIENT
Start: 2018-05-10 | End: 2018-05-10 | Stop reason: SURG

## 2018-05-10 RX ORDER — SODIUM CHLORIDE 0.9 % (FLUSH) 0.9 %
3 SYRINGE (ML) INJECTION AS NEEDED
Status: DISCONTINUED | OUTPATIENT
Start: 2018-05-10 | End: 2018-05-10 | Stop reason: HOSPADM

## 2018-05-10 RX ORDER — SODIUM CHLORIDE 9 MG/ML
500 INJECTION, SOLUTION INTRAVENOUS CONTINUOUS PRN
Status: DISCONTINUED | OUTPATIENT
Start: 2018-05-10 | End: 2018-05-10 | Stop reason: HOSPADM

## 2018-05-10 RX ADMIN — PROPOFOL 50 MG: 10 INJECTION, EMULSION INTRAVENOUS at 11:04

## 2018-05-10 RX ADMIN — PROPOFOL 50 MG: 10 INJECTION, EMULSION INTRAVENOUS at 11:01

## 2018-05-10 RX ADMIN — PROPOFOL 50 MG: 10 INJECTION, EMULSION INTRAVENOUS at 10:54

## 2018-05-10 RX ADMIN — PROPOFOL 50 MG: 10 INJECTION, EMULSION INTRAVENOUS at 10:52

## 2018-05-10 RX ADMIN — SODIUM CHLORIDE 500 ML: 9 INJECTION, SOLUTION INTRAVENOUS at 10:34

## 2018-05-10 RX ADMIN — PROPOFOL 50 MG: 10 INJECTION, EMULSION INTRAVENOUS at 10:53

## 2018-05-10 RX ADMIN — LIDOCAINE HYDROCHLORIDE 0.5 ML: 10 INJECTION, SOLUTION EPIDURAL; INFILTRATION; INTRACAUDAL; PERINEURAL at 10:35

## 2018-05-10 RX ADMIN — PROPOFOL 50 MG: 10 INJECTION, EMULSION INTRAVENOUS at 10:58

## 2018-05-10 RX ADMIN — PROPOFOL 50 MG: 10 INJECTION, EMULSION INTRAVENOUS at 10:51

## 2018-05-10 RX ADMIN — PROPOFOL 50 MG: 10 INJECTION, EMULSION INTRAVENOUS at 11:10

## 2018-05-10 NOTE — ANESTHESIA PREPROCEDURE EVALUATION
Anesthesia Evaluation     Patient summary reviewed   no history of anesthetic complications:  NPO Solid Status: > 8 hours  NPO Liquid Status: < 2 hours           Airway   Mallampati: I  TM distance: >3 FB  Neck ROM: full  Dental          Pulmonary    (+) a smoker Current Smoked day of surgery, COPD, shortness of breath,   Cardiovascular   Exercise tolerance: poor (<4 METS)    Patient on routine beta blocker and Beta blocker given within 24 hours of surgery    (+) hypertension, past MI (10/2017) , dysrhythmias, hyperlipidemia,   (-) cardiac stents    ROS comment: Loop recorder, patient explains battery is EOL    Echo:  ·Left ventricular wall thickness is consistent with mild concentric hypertrophy.  ·Left ventricular systolic function is normal. Estimated EF = 60%.  ·Mild pulmonary hypertension is present.       Neuro/Psych  (+) TIA,     (-) seizures, CVA  GI/Hepatic/Renal/Endo    (+)  GERD,  liver disease (fatty), renal disease (aneurysm),   (-) diabetes    Musculoskeletal     Abdominal    Substance History      OB/GYN          Other        ROS/Med Hx Other: Lupus                  Anesthesia Plan    ASA 3     general   total IV anesthesia  intravenous induction   Anesthetic plan and risks discussed with patient.

## 2018-05-10 NOTE — H&P
Chief Complaint:   Upper quadrant pain, dysphagia, screening    Subjective     HPI:   The patient has had complaints of right upper quadrant pain.  She also has complaints of dysphagia.  She states that she has had to have her esophagus stretched in the past.  She has had no recent colonoscopy.  Family history is negative.    Past Medical History:   Past Medical History:   Diagnosis Date   • COPD (chronic obstructive pulmonary disease)    • GERD (gastroesophageal reflux disease)    • Hypertension    • Low back pain potentially associated with spinal stenosis    • Lupus    • Scoliosis    • Shortness of breath        Past Surgical History:  Past Surgical History:   Procedure Laterality Date   • CARDIAC CATHETERIZATION     • CARDIAC DEFIBRILLATOR PLACEMENT     •  SECTION     • CHOLECYSTECTOMY     • COLON RESECTION       or    • COLONOSCOPY     • EYE SURGERY      x 2   • HYSTERECTOMY     • LEG SURGERY      bars/bolts placed   • UPPER GASTROINTESTINAL ENDOSCOPY         Family History:  Family History   Problem Relation Age of Onset   • Cancer Mother    • Heart disease Mother    • Heart disease Father    • Lupus Sister    • Heart disease Sister    • Heart disease Sister    • No Known Problems Sister    • Hypokalemia Sister    • Alcohol abuse Sister    • Colon cancer Neg Hx    • Colon polyps Neg Hx        Social History:   reports that she has been smoking Cigarettes.  She has been smoking about 0.50 packs per day. She has never used smokeless tobacco. She reports that she does not drink alcohol or use drugs.    Medications:   Prescriptions Prior to Admission   Medication Sig Dispense Refill Last Dose   • amLODIPine (NORVASC) 5 MG tablet TAKE 1 TABLET BY MOUTH DAILY. 30 tablet 5 5/10/2018 at 0830   • atenolol (TENORMIN) 50 MG tablet TAKE 1 TABLET BY MOUTH DAILY. 30 tablet 5 5/10/2018 at 0830   • CloNIDine (CATAPRES) 0.1 MG tablet Take 1 tablet by mouth 2 (Two) Times a Day. Needs appt for further  "refills 60 tablet 11 5/10/2018 at 0830   • doxepin (SINEquan) 25 MG capsule 1 CAPSULE BY MOUTH AT BEDTIME FILL ON OR AFTER 1/4/2018  1 5/9/2018 at 2200   • gabapentin (NEURONTIN) 300 MG capsule Take 300 mg by mouth 3 (Three) Times a Day.   5/10/2018 at 0830   • gabapentin (NEURONTIN) 600 MG tablet Take 600 mg by mouth Every Night.   5/9/2018 at 2200   • hydrochlorothiazide (HYDRODIURIL) 25 MG tablet Take 1 tab by mouth daily 30 tablet 5 5/9/2018 at 0700   • HYDROcodone-acetaminophen (NORCO)  MG per tablet Every 6 (Six) Hours.   5/10/2018 at 0830   • lisinopril (PRINIVIL,ZESTRIL) 40 MG tablet Take 1 tab by mouth twice daily 60 tablet 5 5/10/2018 at 0830   • pantoprazole (PROTONIX) 40 MG EC tablet Take 1 tablet by mouth Daily. 30 tablet 11 5/10/2018 at 0830   • sucralfate (CARAFATE) 1 g tablet TAKE 1 TABLET BY MOUTH 4 (FOUR) TIMES A DAY. 120 tablet 1 5/9/2018 at 2000   • busPIRone (BUSPAR) 15 MG tablet 1 TABLET BY MOUTH THREE TIMES A DAY  2 Taking   • MOVIPREP 100 g reconstituted solution powder Take first container at 6PM  Drink each christoph every 15 minutes until gone, then follow with 16 oz of water.  Repeat in morning as directed 1 each 0 t at 0830   • nitroglycerin (NITROSTAT) 0.4 MG SL tablet Take no more than 3 doses in 15 minutes. 30 tablet 1 More than a month at Unknown time   • oxyCODONE-acetaminophen (PERCOCET) 7.5-325 MG per tablet Take 1 tablet by mouth Every 4 (Four) Hours As Needed for Moderate Pain . 15 tablet 0 Taking       Allergies:  Aspirin and Latex    ROS:    General: Weight stable  Resp: No SOA  Cardiovascular: No CP      Objective     /67 (BP Location: Right arm, Patient Position: Sitting)   Pulse 76   Temp 97.9 °F (36.6 °C) (Temporal Artery )   Resp 20   Ht 160 cm (63\")   Wt 79.8 kg (176 lb)   SpO2 94%   BMI 31.18 kg/m²     Physical Exam   Constitutional: Pt is oriented to person, place, and in no distress.  Eyes: No icterus  ENMT: Unremarkable   Cardiovascular: Normal rate, " regular rhythm.    Pulmonary/Chest: No distress.  No audible wheezes  Abdominal: Soft.   Skin: Skin is warm and dry.   Psychiatric: Mood, memory, affect and judgment appear normal.     Assessment/Plan     Diagnosis:  Right upper quadrant pain  Dysphagia  Colon cancer screening    Anticipated Surgical Procedure:  Endoscopy and colonoscopy    The risks, benefits, and alternatives of endoscopy were reviewed with the patient today.  Risks including perforation, with or without dilation, possibly requiring surgery.  Additional risks include risk of bleeding.  There is also the risk of a drug reaction or problems with anesthesia.  This will be discussed with the further by the anesthesia team on the day of the procedure. The benefits include the diagnosis and management of disease of the upper digestive tract.  Alternatives to endoscopy include upper GI series, laboratory testing, radiographic evaluation, or no intervention.  The patient verbalizes understanding and agrees.    The risks, benefits, and alternatives of colonoscopy were reviewed with the patient today.  Risks including perforation of the colon possibly requiring surgery or colostomy.  Additional risks include risk of bleeding from biopsies or removal of colon tissue.  There is also the risk of a drug reaction or problems with anesthesia.  This will be discussed with the further by the anesthesia team on the day of the procedure.  Lastly there is a possibility of missing a colon polyp or cancer.  The benefits include the diagnosis and management of disease of the colon and rectum.  Alternatives to colonoscopy include barium enema, laboratory testing, radiographic evaluation, or no intervention.  The patient verbalizes understanding and agrees.    Much of this encounter note is an electronic transcription/translation of spoken language to printed text. The electronic translation of spoken language may permit erroneous, or at times, nonsensical words or phrases  to be inadvertently transcribed; although I have reviewed the note for such errors, some may still exist.

## 2018-05-10 NOTE — ANESTHESIA POSTPROCEDURE EVALUATION
"Patient: Akua Aguilar    Procedure Summary     Date:  05/10/18 Room / Location:  [facility] ENDOSCOPY 5 / [facility]    Anesthesia Start:  1046 Anesthesia Stop:  1117    Procedures:       ESOPHAGOGASTRODUODENOSCOPY WITH ANESTHESIA (N/A Esophagus)      COLONOSCOPY WITH ANESTHESIA (N/A ) Diagnosis:       Indigestion      Nausea      Tobacco abuse      Epigastric pain      RUQ pain      Colon cancer screening      Gastroesophageal reflux disease, esophagitis presence not specified      (Indigestion [K30])      (Nausea [R11.0])      (Tobacco abuse [Z72.0])      (Epigastric pain [R10.13])      (RUQ pain [R10.11])      (Colon cancer screening [Z12.11])      (Gastroesophageal reflux disease, esophagitis presence not specified [K21.9])    Surgeon:  Shawna Valladares MD Provider:  Juan Ashley CRNA    Anesthesia Type:  general ASA Status:  3          Anesthesia Type: general  Last vitals  BP   101/67 (05/10/18 1000)   Temp   97.9 °F (36.6 °C) (05/10/18 1000)   Pulse   76 (05/10/18 1000)   Resp   20 (05/10/18 1000)     SpO2   94 % (05/10/18 1000)     Post Anesthesia Care and Evaluation    Patient location during evaluation: PHASE II  Patient participation: complete - patient participated  Level of consciousness: awake and alert  Pain management: adequate  Airway patency: patent  Anesthetic complications: No anesthetic complications    Cardiovascular status: acceptable  Respiratory status: acceptable  Hydration status: acceptable    Comments: Blood pressure 101/67, pulse 76, temperature 97.9 °F (36.6 °C), temperature source Temporal Artery , resp. rate 20, height 160 cm (63\"), weight 79.8 kg (176 lb), SpO2 94 %, not currently breastfeeding.    Pt discharged from PACU based on gema score >8      "

## 2018-05-30 RX ORDER — LISINOPRIL 40 MG/1
TABLET ORAL
Qty: 60 TABLET | Refills: 7 | Status: SHIPPED | OUTPATIENT
Start: 2018-05-30 | End: 2019-01-14 | Stop reason: SDUPTHER

## 2018-08-07 ENCOUNTER — OFFICE VISIT (OUTPATIENT)
Dept: CARDIOLOGY | Facility: CLINIC | Age: 58
End: 2018-08-07

## 2018-08-07 VITALS
HEART RATE: 70 BPM | DIASTOLIC BLOOD PRESSURE: 68 MMHG | SYSTOLIC BLOOD PRESSURE: 102 MMHG | WEIGHT: 181 LBS | HEIGHT: 63 IN | BODY MASS INDEX: 32.07 KG/M2 | OXYGEN SATURATION: 98 %

## 2018-08-07 DIAGNOSIS — R06.00 DYSPNEA, UNSPECIFIED TYPE: ICD-10-CM

## 2018-08-07 DIAGNOSIS — K21.9 GASTROESOPHAGEAL REFLUX DISEASE WITHOUT ESOPHAGITIS: ICD-10-CM

## 2018-08-07 DIAGNOSIS — R06.02 SHORTNESS OF BREATH: ICD-10-CM

## 2018-08-07 DIAGNOSIS — I77.89 ENLARGEMENT OF ABDOMINAL AORTA (HCC): ICD-10-CM

## 2018-08-07 DIAGNOSIS — R07.2 PRECORDIAL PAIN: ICD-10-CM

## 2018-08-07 DIAGNOSIS — I25.10 NONOBSTRUCTIVE ATHEROSCLEROSIS OF CORONARY ARTERY: ICD-10-CM

## 2018-08-07 DIAGNOSIS — M79.89 LEG SWELLING: ICD-10-CM

## 2018-08-07 DIAGNOSIS — R11.0 NAUSEA: ICD-10-CM

## 2018-08-07 DIAGNOSIS — K76.0 FATTY LIVER: ICD-10-CM

## 2018-08-07 DIAGNOSIS — Z72.0 TOBACCO ABUSE: ICD-10-CM

## 2018-08-07 DIAGNOSIS — K30 INDIGESTION: ICD-10-CM

## 2018-08-07 DIAGNOSIS — E78.2 MIXED HYPERLIPIDEMIA: ICD-10-CM

## 2018-08-07 DIAGNOSIS — I10 ESSENTIAL HYPERTENSION: Primary | ICD-10-CM

## 2018-08-07 DIAGNOSIS — N20.0 RENAL STONE: ICD-10-CM

## 2018-08-07 PROCEDURE — 93000 ELECTROCARDIOGRAM COMPLETE: CPT | Performed by: INTERNAL MEDICINE

## 2018-08-07 PROCEDURE — 99214 OFFICE O/P EST MOD 30 MIN: CPT | Performed by: INTERNAL MEDICINE

## 2018-08-07 NOTE — PROGRESS NOTES
Akua Aguilar  [id number]  1960  57 y.o.  female    Referring Provider: Remy Alcaraz MD    Reason for Follow-up Visit:  Routine follow up.  Similar symptoms as during last visit     Subjective     Similar symptoms as during last visit   Moderate exertional shortness of breath on exertion relieved with rest  No significant cough or wheezing  Going on for several months  No palpitations  associated chest pain  Mild pedal edema  No fever or chills  No significant expectoration  No hemoptysis  No presyncope or syncope   Associated  pedal edema  Overall doing well  No excessive shortness of breath  No palpitations  No exertional chest pain  No diaphoresis  No nausea  No radiation  Precipitated with exertion  Moderate associated dyspnea    Compliant with medications  Still smoking 1 ppd      History of present illness:  Akua Aguilar is a 57 y.o. yo female with history of  Essential Hypertension   who presents today for   Chief Complaint   Patient presents with   • Hypertension     8 MON FU    .    History  Past Medical History:   Diagnosis Date   • COPD (chronic obstructive pulmonary disease) (CMS/HCC)    • GERD (gastroesophageal reflux disease)    • Hypertension    • Low back pain potentially associated with spinal stenosis    • Lupus    • Scoliosis    • Shortness of breath    ,   Past Surgical History:   Procedure Laterality Date   • CARDIAC CATHETERIZATION     • CARDIAC DEFIBRILLATOR PLACEMENT     •  SECTION     • CHOLECYSTECTOMY     • COLON RESECTION       or    • COLONOSCOPY     • COLONOSCOPY N/A 5/10/2018    Procedure: COLONOSCOPY WITH ANESTHESIA;  Surgeon: Shawna Valladares MD;  Location: [facility] ENDOSCOPY;  Service: Gastroenterology   • ENDOSCOPY N/A 5/10/2018    Procedure: ESOPHAGOGASTRODUODENOSCOPY WITH ANESTHESIA;  Surgeon: Shawna Valladares MD;  Location: [facility] ENDOSCOPY;  Service: Gastroenterology   • EYE SURGERY      x 2   • HYSTERECTOMY     • LEG SURGERY       bars/bolts placed   • UPPER GASTROINTESTINAL ENDOSCOPY  1993   ,   Family History   Problem Relation Age of Onset   • Cancer Mother    • Heart disease Mother    • Heart disease Father    • Lupus Sister    • Heart disease Sister    • Heart disease Sister    • No Known Problems Sister    • Hypokalemia Sister    • Alcohol abuse Sister    • Colon cancer Neg Hx    • Colon polyps Neg Hx    ,   Social History   Substance Use Topics   • Smoking status: Current Every Day Smoker     Packs/day: 0.50     Types: Cigarettes   • Smokeless tobacco: Never Used   • Alcohol use No   ,     Medications  Current Outpatient Prescriptions   Medication Sig Dispense Refill   • amLODIPine (NORVASC) 5 MG tablet TAKE 1 TABLET BY MOUTH DAILY. 30 tablet 5   • atenolol (TENORMIN) 50 MG tablet TAKE 1 TABLET BY MOUTH DAILY. 30 tablet 5   • busPIRone (BUSPAR) 15 MG tablet 1 TABLET BY MOUTH THREE TIMES A DAY  2   • CloNIDine (CATAPRES) 0.1 MG tablet Take 1 tablet by mouth 2 (Two) Times a Day. Needs appt for further refills 60 tablet 11   • doxepin (SINEquan) 25 MG capsule 1 CAPSULE BY MOUTH AT BEDTIME FILL ON OR AFTER 1/4/2018  1   • gabapentin (NEURONTIN) 300 MG capsule Take 300 mg by mouth 3 (Three) Times a Day.     • gabapentin (NEURONTIN) 600 MG tablet Take 600 mg by mouth Every Night.     • hydrochlorothiazide (HYDRODIURIL) 25 MG tablet Take 1 tab by mouth daily 30 tablet 5   • HYDROcodone-acetaminophen (NORCO)  MG per tablet Every 6 (Six) Hours.     • lisinopril (PRINIVIL,ZESTRIL) 40 MG tablet TAKE 1 TABLET BY MOUTH TWICE A DAY 60 tablet 7   • nitroglycerin (NITROSTAT) 0.4 MG SL tablet Take no more than 3 doses in 15 minutes. 30 tablet 1   • pantoprazole (PROTONIX) 40 MG EC tablet Take 1 tablet by mouth Daily. 30 tablet 11   • sucralfate (CARAFATE) 1 g tablet TAKE 1 TABLET BY MOUTH 4 (FOUR) TIMES A DAY. 120 tablet 1     No current facility-administered medications for this visit.        Allergies:  Aspirin and Latex    Review of  "Systems  Review of Systems   Constitution: Positive for weakness and malaise/fatigue.   HENT: Negative.    Eyes: Negative.    Cardiovascular: Positive for chest pain, dyspnea on exertion and leg swelling. Negative for claudication, cyanosis, irregular heartbeat, near-syncope, orthopnea, palpitations, paroxysmal nocturnal dyspnea and syncope.   Respiratory: Negative.    Endocrine: Negative.    Hematologic/Lymphatic: Negative.    Skin: Negative.    Musculoskeletal: Positive for arthritis and back pain.   Gastrointestinal: Negative for anorexia.   Genitourinary: Negative.    Psychiatric/Behavioral: Negative.        Objective     Physical Exam:  /68   Pulse 70   Ht 160 cm (62.99\")   Wt 82.1 kg (181 lb)   SpO2 98%   BMI 32.07 kg/m²   Physical Exam   Constitutional: She appears well-developed.   HENT:   Head: Normocephalic.   Neck: Normal carotid pulses and no JVD present. No tracheal tenderness present. Carotid bruit is not present. No tracheal deviation and no edema present.   Cardiovascular: Regular rhythm, normal heart sounds and normal pulses.    Pulmonary/Chest: Effort normal. No stridor.   Abdominal: Soft.   Neurological: She is alert. She has normal strength. No cranial nerve deficit or sensory deficit.   Skin: Skin is warm.   Psychiatric: She has a normal mood and affect. Her speech is normal and behavior is normal.       Results Review:  Results for orders placed during the hospital encounter of 10/25/17   Adult Transthoracic Echo Complete W/ Cont if Necessary Per Protocol    Narrative · Left ventricular wall thickness is consistent with mild concentric   hypertrophy.  · Left ventricular systolic function is normal. Estimated EF = 60%.  · Mild pulmonary hypertension is present.             ECG 12 Lead  Date/Time: 8/7/2018 1:15 PM  Performed by: SRIDEVI BUITRAGO  Authorized by: SRIDEVI BUITRAGO   Comparison: compared with previous ECG from 10/25/2017  Similar to previous ECG  Comparison to previous ECG: " "premature ventricular contraction is new  Rhythm: sinus rhythm  Ectopy: PVCs  Rate: normal  Conduction: conduction normal  ST Segments: ST segments normal  T Waves: T waves normal  QRS axis: normal  Other: no other findings  Clinical impression: abnormal ECG            Assessment/Plan   Akua was seen today for hypertension.    Diagnoses and all orders for this visit:    Essential hypertension    Mixed hyperlipidemia    Shortness of breath    Nausea    Gastroesophageal reflux disease without esophagitis    Dyspnea, unspecified type    Indigestion    Fatty liver    Precordial pain    Leg swelling    Nonobstructive atherosclerosis of coronary artery    Tobacco abuse    Renal stone    Enlargement of abdominal aorta (CMS/HCC) small infra renal  -     US aorta limited; Future    Other orders  -     ECG 12 Lead         No significant pedal edema. Compliant with medications and diet. Latest labs and medications reviewed.    Plan:    Low salt/ HTN/ Heart healthy carbohydrate restricted cardiac diet as applicable to this patient's current diagnoses.   This handout has relevant information regarding shopping for food, preparing meals, what to eat at restaurants, tracking of food intake, information regarding sodium intake and salt content, how to read food labels, knowing what to eat, tips reagarding physical activity, calorie count and calorie expenditure. What foods to avoid. Information regarding alcoholic drinks along with \"good\" and \"bad\" fats.  Reiterated prior recommendations     The patient is advised to reduce or avoid caffeine or other cardiac stimulants.     The patient was advised that NSAID-type medications have three  very important potential side effects: cardiovascular complications, gastrointestinal irritation including hemorrhage and renal injuries.  Do not use anti-inflammatories such as Motrin/ibuprofen, Alleve/naprosyn, Mobic and like medications Use Tylenol instead.The patient expresses understanding of " these issues and questions were answered.   Monitor for any signs of bleeding including red or dark stools. Fall precautions.       Monitor for any signs of bleeding including red or dark stools. Fall precautions.   Patient is asked to monitor BP at home or work, several times per month and return with written values at next office visit.     Advised staying uptodate with immunizations per established standard guidelines.    Reviewed available prior notes, consults, prior visits, laboratory findings, radiology And cardiology relevant reports. Updated chart as applicable. I have reviewed the patient's medical history in detail and updated the computerized patient record as relevant.    Updated patient regarding any new or relevant abnormalities on review of records or any new findings on physical exam. Mentioned to patient about purpose of visit and desirable health short and long term goals and objectives.    Offered to give patient  a copy of my notes and relevant tests/ prior ECG etc for the patient to review and follow specific advise and relevant findings if any, prognosis, along with my current and future plans.      Questions were encouraged, asked and answered to the patient's  understanding and satisfaction. Questions if any regarding current medications and side effects, need for refills and importance of compliance to medications stressed.    Reviewed available prior notes, consults, prior visits, laboratory findings, radiology and cardiology relevant reports. Updated chart as applicable. I have reviewed the patient's medical history in detail and updated the computerized patient record as relevant.    Updated patient regarding any new or relevant abnormalities on review of records or any new findings on physical exam. Mentioned to patient about purpose of visit and desirable health short and long term goals and objectives.    Monitor CBC, CMP, TSH (as indicated) and Lipid Panel by primary     During this  visit, I spent 3-10 mintues counseling the patient regarding smoking cessation.   Educational material relevant to tobacco cessation provided.  Benefits of tobacco cessation including  exposure to second hand smoke as well as no exposure to non inhaled tobacco products stressed    No additional cardiac testing required at this point in time.     Recommend evaluation for obstructive sleep apnea given snoring and daytime somnolence and fatigue by primary provider     Orders Placed This Encounter   Procedures   • US aorta limited     Standing Status:   Future     Standing Expiration Date:   8/7/2019     Order Specific Question:   Reason for Exam:     Answer:   enlarged aorta   • ECG 12 Lead     This order was created via procedure documentation            Return in about 6 months (around 2/7/2019).

## 2018-08-13 ENCOUNTER — APPOINTMENT (OUTPATIENT)
Dept: ULTRASOUND IMAGING | Facility: HOSPITAL | Age: 58
End: 2018-08-13
Attending: INTERNAL MEDICINE

## 2018-10-23 RX ORDER — AMLODIPINE BESYLATE 5 MG/1
TABLET ORAL
Qty: 30 TABLET | Refills: 10 | Status: SHIPPED | OUTPATIENT
Start: 2018-10-23 | End: 2020-05-01 | Stop reason: SDUPTHER

## 2018-11-12 NOTE — PATIENT INSTRUCTIONS

## 2018-11-12 NOTE — PROGRESS NOTES
Ms. Aguilar is 58 y.o. female    Chief Complaint   Patient presents with   • Flank Pain       Flank Pain   This is a new problem. The current episode started in the past 7 days. The problem occurs constantly. The problem is unchanged. Pain location: right flank. The quality of the pain is described as stabbing. Radiates to: pelvis. The pain is severe. The pain is the same all the time. The symptoms are aggravated by position. Associated symptoms include dysuria and pelvic pain. Pertinent negatives include no abdominal pain, chest pain, fever, headaches, numbness or weakness. Risk factors: history of stones. She has tried analgesics for the symptoms. The treatment provided no relief.       The following portions of the patient's history were reviewed and updated as appropriate: allergies, current medications, past family history, past medical history, past social history, past surgical history and problem list.    Review of Systems   Constitutional: Negative for appetite change, chills, fatigue, fever and unexpected weight change.   HENT: Negative for congestion, dental problem, ear pain, hearing loss, nosebleeds, sinus pressure and trouble swallowing.    Eyes: Negative for pain, discharge, redness and itching.   Respiratory: Negative for apnea, cough, choking and shortness of breath.    Cardiovascular: Negative for chest pain and palpitations.   Gastrointestinal: Negative for abdominal distention, abdominal pain, blood in stool, constipation, diarrhea, nausea and vomiting.   Endocrine: Negative for cold intolerance and heat intolerance.   Genitourinary: Positive for difficulty urinating, dysuria, flank pain (right), frequency, pelvic pain, urgency and vaginal pain. Negative for decreased urine volume, dyspareunia, enuresis, genital sores, hematuria, menstrual problem, vaginal bleeding and vaginal discharge.   Musculoskeletal: Negative for arthralgias, back pain and gait problem.   Skin: Negative for pallor, rash  and wound.   Allergic/Immunologic: Negative for immunocompromised state.   Neurological: Negative for dizziness, tremors, seizures, weakness, numbness and headaches.   Hematological: Negative for adenopathy. Does not bruise/bleed easily.   Psychiatric/Behavioral: Negative for agitation, behavioral problems, hallucinations, self-injury and suicidal ideas.         Current Outpatient Medications:   •  amLODIPine (NORVASC) 5 MG tablet, TAKE 1 TABLET BY MOUTH EVERY DAY, Disp: 30 tablet, Rfl: 10  •  atenolol (TENORMIN) 50 MG tablet, TAKE 1 TABLET BY MOUTH DAILY., Disp: 30 tablet, Rfl: 5  •  ciprofloxacin (CIPRO) 500 MG tablet, TAKE 1 TABLET BY MOUTH TWICE A DAY FOR 7 DAYS, Disp: , Rfl: 0  •  CloNIDine (CATAPRES) 0.1 MG tablet, Take 1 tablet by mouth 2 (Two) Times a Day. Needs appt for further refills, Disp: 60 tablet, Rfl: 11  •  doxepin (SINEquan) 25 MG capsule, 1 CAPSULE BY MOUTH AT BEDTIME FILL ON OR AFTER 1/4/2018, Disp: , Rfl: 1  •  gabapentin (NEURONTIN) 300 MG capsule, Take 300 mg by mouth 2 (Two) Times a Day., Disp: , Rfl:   •  gabapentin (NEURONTIN) 600 MG tablet, Take 900 mg by mouth Every Night., Disp: , Rfl:   •  hydrochlorothiazide (HYDRODIURIL) 25 MG tablet, Take 1 tab by mouth daily, Disp: 30 tablet, Rfl: 5  •  HYDROcodone-acetaminophen (NORCO)  MG per tablet, Every 6 (Six) Hours., Disp: , Rfl:   •  lisinopril (PRINIVIL,ZESTRIL) 40 MG tablet, TAKE 1 TABLET BY MOUTH TWICE A DAY, Disp: 60 tablet, Rfl: 7  •  nitroglycerin (NITROSTAT) 0.4 MG SL tablet, Take no more than 3 doses in 15 minutes., Disp: 30 tablet, Rfl: 1  •  ondansetron ODT (ZOFRAN-ODT) 4 MG disintegrating tablet, DISSOLVE 1 TABLET BY MOUTH EVERY 4 TO 6 HOURS AS NEEDED FOR NAUSEA AND VOMITING, Disp: , Rfl: 0  •  pantoprazole (PROTONIX) 40 MG EC tablet, Take 1 tablet by mouth Daily., Disp: 30 tablet, Rfl: 11  •  tamsulosin (FLOMAX) 0.4 MG capsule 24 hr capsule, Take 1 capsule by mouth Daily., Disp: , Rfl: 0    Past Medical History:   Diagnosis  "Date   • Arthritis    • Cancer (CMS/HCC)     CREVICAL, UTERINE   • COPD (chronic obstructive pulmonary disease) (CMS/HCC)    • Fatty liver    • GERD (gastroesophageal reflux disease)    • History of transfusion    • Hypertension    • Kidney stone    • Low back pain potentially associated with spinal stenosis    • Lupus    • Migraines    • Scoliosis    • Shortness of breath    • UTI (urinary tract infection)        Past Surgical History:   Procedure Laterality Date   • CARDIAC CATHETERIZATION     • CARDIAC DEFIBRILLATOR PLACEMENT     •  SECTION     • CHOLECYSTECTOMY     • COLON RESECTION       or    • COLONOSCOPY     • EYE SURGERY      x 2   • HYSTERECTOMY     • LEG SURGERY      bars/bolts placed   • UPPER GASTROINTESTINAL ENDOSCOPY         Social History     Socioeconomic History   • Marital status: Single     Spouse name: Not on file   • Number of children: Not on file   • Years of education: Not on file   • Highest education level: Not on file   Tobacco Use   • Smoking status: Current Every Day Smoker     Packs/day: 0.50     Types: Cigarettes   • Smokeless tobacco: Never Used   Substance and Sexual Activity   • Alcohol use: No   • Drug use: No   • Sexual activity: Defer       Family History   Problem Relation Age of Onset   • Cancer Mother    • Heart disease Mother    • Heart disease Father    • Lupus Sister    • Heart disease Sister    • Heart disease Sister    • No Known Problems Sister    • Hypokalemia Sister    • Alcohol abuse Sister    • Colon cancer Neg Hx    • Colon polyps Neg Hx        Objective    Temp 97 °F (36.1 °C)   Ht 160 cm (63\")   Wt 76.2 kg (168 lb)   BMI 29.76 kg/m²     Physical Exam  Constitutional: Well nourished, Well developed; No apparent distress, her vital signs are reviewed  Psychiatric: Appropriate affect; Alert and oriented  Eyes: Unremarkable  Musculoskeletal: Normal gait and station  GI: Abdomen is soft, TTP RLQ  Respiratory: No distress; Unlabored movement; " No accessory musculature needed with symmetric movements  Skin: No pallor or diaphoresis       Lab on 04/20/2018   Component Date Value Ref Range Status   • Glucose 04/20/2018 103* 70 - 100 mg/dL Final   • BUN 04/20/2018 20  5 - 21 mg/dL Final   • Creatinine 04/20/2018 0.62  0.50 - 1.40 mg/dL Final   • Sodium 04/20/2018 141  135 - 145 mmol/L Final   • Potassium 04/20/2018 3.8  3.5 - 5.3 mmol/L Final   • Chloride 04/20/2018 102  98 - 110 mmol/L Final   • CO2 04/20/2018 31.0  24.0 - 31.0 mmol/L Final   • Calcium 04/20/2018 9.7  8.4 - 10.4 mg/dL Final   • Total Protein 04/20/2018 7.6  6.3 - 8.7 g/dL Final   • Albumin 04/20/2018 4.40  3.50 - 5.00 g/dL Final   • ALT (SGPT) 04/20/2018 23  0 - 54 U/L Final   • AST (SGOT) 04/20/2018 20  7 - 45 U/L Final   • Alkaline Phosphatase 04/20/2018 67  24 - 120 U/L Final   • Total Bilirubin 04/20/2018 0.4  0.1 - 1.0 mg/dL Final   • eGFR Non African Amer 04/20/2018 99  >60 mL/min/1.73 Final   • Globulin 04/20/2018 3.2  gm/dL Final   • A/G Ratio 04/20/2018 1.4  1.1 - 2.5 g/dL Final   • BUN/Creatinine Ratio 04/20/2018 32.3* 7.0 - 25.0 Final   • Anion Gap 04/20/2018 8.0  4.0 - 13.0 mmol/L Final       Results for orders placed or performed in visit on 04/20/18   Comprehensive Metabolic Panel   Result Value Ref Range    Glucose 103 (H) 70 - 100 mg/dL    BUN 20 5 - 21 mg/dL    Creatinine 0.62 0.50 - 1.40 mg/dL    Sodium 141 135 - 145 mmol/L    Potassium 3.8 3.5 - 5.3 mmol/L    Chloride 102 98 - 110 mmol/L    CO2 31.0 24.0 - 31.0 mmol/L    Calcium 9.7 8.4 - 10.4 mg/dL    Total Protein 7.6 6.3 - 8.7 g/dL    Albumin 4.40 3.50 - 5.00 g/dL    ALT (SGPT) 23 0 - 54 U/L    AST (SGOT) 20 7 - 45 U/L    Alkaline Phosphatase 67 24 - 120 U/L    Total Bilirubin 0.4 0.1 - 1.0 mg/dL    eGFR Non African Amer 99 >60 mL/min/1.73    Globulin 3.2 gm/dL    A/G Ratio 1.4 1.1 - 2.5 g/dL    BUN/Creatinine Ratio 32.3 (H) 7.0 - 25.0    Anion Gap 8.0 4.0 - 13.0 mmol/L     Patient's Body mass index is 29.76 kg/m². BMI  is above normal parameters. Recommendations include: educational material.    Assessment and Plan    Akua was seen today for flank pain.    Diagnoses and all orders for this visit:    Right ureteral stone  -     Cancel: POC Urinalysis Dipstick, Multipro  -     Case Request; Standing  -     ceFAZolin (ANCEF) 2 g in sodium chloride 0.9 % 100 mL IVPB; Infuse 2 g into a venous catheter 1 (One) Time.  -     Case Request    Other orders  -     Follow Anesthesia Guidelines / Standing Orders; Future  -     Provide instructions to patient on NPO status  -     Obtain informed consent  -     Follow Anesthesia Guidelines / Standing Orders; Standing  -     Verify NPO Status; Standing    I reviewed her CT scan which showed right-sided hydronephrosis and hydroureter to the level of a 5 mm stone.  She also does have nonobstructing renal stones.  White blood cell count mildly elevated, creatinine within normal limits.  Urine nitrite positive, patient unable to urinate today.    Patient is not a significant amount of pain and does not wish to proceed with a trial passage of the stone.  We discussed treatment options and she is chosen ureteroscopy which I think is most appropriate.  She understands risk and benefits as outlined below as well as alternatives.  She understands that she will need a stent for approximately 5 days postoperatively unless there is a need for dilation which she may need a longer.  In terms of pain control, patient is on a pain contract him I will not give her any further narcotics but I will send her home with Toradol.    The patient has a right distal ureteral calculus as defined by symptoms and radiographic studies.  Options for the management of this stone are discussed based upon size, location, symptoms, and probable composition including watchful waiting, expulsive therapy, ESWL, ureteral stenting, percutaneous management, and open approaches.  Based upon this discussed, The patient has elected to  proceed with ureteroscopic management of the stone.  Ms. Aguilar understands that a laser or other fragmentation aid may be needed.  The need for ureteral stenting and subsequent removal is also discussed.  Risks of bleeding, infection, damage to urethra or bladder, ureteral perforation or avulsion, pain, and post operative stent discomfort are all discussed.  I discussed the need for return follow up for stent removal, and that failure to do so could result in significant infection, further stone formation, need for surgical intervention to remove the stent, or permanent kidney damage.  All questions were answered to the patient's satifaction

## 2018-11-13 ENCOUNTER — APPOINTMENT (OUTPATIENT)
Dept: PREADMISSION TESTING | Facility: HOSPITAL | Age: 58
End: 2018-11-13

## 2018-11-13 ENCOUNTER — HOSPITAL ENCOUNTER (OUTPATIENT)
Dept: GENERAL RADIOLOGY | Facility: HOSPITAL | Age: 58
Discharge: HOME | End: 2018-11-13
Attending: UROLOGY | Admitting: UROLOGY

## 2018-11-13 ENCOUNTER — OFFICE VISIT (OUTPATIENT)
Dept: UROLOGY | Facility: CLINIC | Age: 58
End: 2018-11-13

## 2018-11-13 VITALS — HEIGHT: 63 IN | TEMPERATURE: 97 F | BODY MASS INDEX: 29.77 KG/M2 | WEIGHT: 168 LBS

## 2018-11-13 DIAGNOSIS — N20.1 RIGHT URETERAL STONE: Primary | ICD-10-CM

## 2018-11-13 DIAGNOSIS — N20.0 KIDNEY STONES: ICD-10-CM

## 2018-11-13 LAB
ANION GAP SERPL CALCULATED.3IONS-SCNC: 9 MMOL/L (ref 4–13)
BUN BLD-MCNC: 12 MG/DL (ref 5–21)
BUN/CREAT SERPL: 21.4 (ref 7–25)
CALCIUM SPEC-SCNC: 9.5 MG/DL (ref 8.4–10.4)
CHLORIDE SERPL-SCNC: 102 MMOL/L (ref 98–110)
CO2 SERPL-SCNC: 32 MMOL/L (ref 24–31)
CREAT BLD-MCNC: 0.56 MG/DL (ref 0.5–1.4)
DEPRECATED RDW RBC AUTO: 46.5 FL (ref 40–54)
ERYTHROCYTE [DISTWIDTH] IN BLOOD BY AUTOMATED COUNT: 13.9 % (ref 12–15)
GFR SERPL CREATININE-BSD FRML MDRD: 111 ML/MIN/1.73
GLUCOSE BLD-MCNC: 104 MG/DL (ref 70–100)
HCT VFR BLD AUTO: 45.1 % (ref 37–47)
HGB BLD-MCNC: 14.6 G/DL (ref 12–16)
MCH RBC QN AUTO: 29.6 PG (ref 28–32)
MCHC RBC AUTO-ENTMCNC: 32.4 G/DL (ref 33–36)
MCV RBC AUTO: 91.3 FL (ref 82–98)
PLATELET # BLD AUTO: 259 10*3/MM3 (ref 130–400)
PMV BLD AUTO: 10.4 FL (ref 6–12)
POTASSIUM BLD-SCNC: 3.6 MMOL/L (ref 3.5–5.3)
RBC # BLD AUTO: 4.94 10*6/MM3 (ref 4.2–5.4)
SODIUM BLD-SCNC: 143 MMOL/L (ref 135–145)
WBC NRBC COR # BLD: 6.87 10*3/MM3 (ref 4.8–10.8)

## 2018-11-13 PROCEDURE — 99204 OFFICE O/P NEW MOD 45 MIN: CPT | Performed by: UROLOGY

## 2018-11-13 PROCEDURE — 85027 COMPLETE CBC AUTOMATED: CPT | Performed by: UROLOGY

## 2018-11-13 PROCEDURE — 74018 RADEX ABDOMEN 1 VIEW: CPT

## 2018-11-13 PROCEDURE — 36415 COLL VENOUS BLD VENIPUNCTURE: CPT

## 2018-11-13 PROCEDURE — 80048 BASIC METABOLIC PNL TOTAL CA: CPT | Performed by: UROLOGY

## 2018-11-13 RX ORDER — CIPROFLOXACIN 500 MG/1
TABLET, FILM COATED ORAL
Refills: 0 | COMMUNITY
Start: 2018-11-10 | End: 2020-05-01

## 2018-11-13 RX ORDER — ONDANSETRON 4 MG/1
TABLET, ORALLY DISINTEGRATING ORAL
Refills: 0 | COMMUNITY
Start: 2018-11-10 | End: 2020-05-01

## 2018-11-13 RX ORDER — OXYCODONE AND ACETAMINOPHEN 7.5; 325 MG/1; MG/1
TABLET ORAL
Refills: 0 | COMMUNITY
Start: 2018-11-10 | End: 2018-11-13

## 2018-11-13 RX ORDER — TAMSULOSIN HYDROCHLORIDE 0.4 MG/1
1 CAPSULE ORAL DAILY
Refills: 0 | COMMUNITY
Start: 2018-11-10 | End: 2020-05-01

## 2018-11-13 NOTE — DISCHARGE INSTRUCTIONS
DAY OF SURGERY INSTRUCTIONS        YOUR SURGEON: ***SAGE HORAN    PROCEDURE: ***LITHOTRIPSY    DATE OF SURGERY: ***NOVEMBER 14,2018    ARRIVAL TIME: AS DIRECTED BY OFFICE    DAY OF SURGERY TAKE ONLY THESE MEDICATIONS UNLESS OTHERWISE INSTRUCTED BY YOUR PHYSICIAN: ***NORVASC, ATENOLOL, PAIN MED          MANAGING PAIN AFTER SURGERY    We know you are probably wondering what your pain will be like after surgery.  Following surgery it is unrealistic to expect you will not have pain.   Pain is how our bodies let us know that something is wrong or cautions us to be careful.  That said, our goal is to make your pain tolerable.    Methods we may use to treat your pain include (oral or IV medications, PCAs, epidurals, nerve blocks, etc.)   While some procedures require IV pain medications for a short time after surgery, transitioning to pain medications by mouth allows for better management of pain.   Your nurse will encourage you to take oral pain medications whenever possible.  IV medications work almost immediately, but only last a short while.  Taking medications by mouth allows for a more constant level of medication in your blood stream for a longer period of time.      Once your pain is out of control it is harder to get back under control.  It is important you are aware when your next dose of pain medication is due.  If you are admitted, your nurse may write the time of your next dose on the white board in your room to help you remember.      We are interested in your pain and encourage you to inform us about aggravating factors during your visit.   Many times a simple repositioning every few hours can make a big difference.    If your physician says it is okay, do not let your pain prevent you from getting out of bed. Be sure to call your nurse for assistance prior to getting up so you do not fall.      Before surgery, please decide your tolerable pain goal.  These faces help describe the pain ratings we use on a  0-10 scale.   Be prepared to tell us your goal and whether or not you take pain or anxiety medications at home.            BEFORE YOU COME TO THE HOSPITAL  (Pre-op instructions)  • Do not eat, drink, smoke or chew gum after midnight the night before surgery.  This also includes no mints.  • Morning of surgery take only the medicines you have been instructed with a sip of water unless otherwise instructed  by your physician.  • Do not shave, wear makeup or dark nail polish.  • Remove all jewelry including rings.  • Leave anything you consider valuable at home.  • Leave your suitcase in the car until after your surgery.  • Bring the following with you if applicable:  o Picture ID and insurance, Medicare or Medicaid cards  o Co-pay/deductible required by insurance (cash, check, credit card)  o Copy of advance directive, living will or power-of- documents if not brought to PAT  o CPAP or BIPAP mask and tubing  o Relaxation aids (MP3 player, book, magazine)  • On the day of surgery check in at registration located at the main entrance of the hospital.       Outpatient Surgery Guidelines, Adult  Outpatient procedures are those for which the person having the procedure is allowed to go home the same day as the procedure. Various procedures are done on an outpatient basis. You should follow some general guidelines if you will be having an outpatient procedure.  LET YOUR HEALTH CARE PROVIDER KNOW ABOUT:  · Any allergies you have.  · All medicines you are taking, including vitamins, herbs, eye drops, creams, and over-the-counter medicines.  · Previous problems you or members of your family have had with the use of anesthetics.  · Any blood disorders you have.  · Previous surgeries you have had.  · Medical conditions you have.  RISKS AND COMPLICATIONS  Your health care provider will discuss possible risks and complications with you before surgery. Common risks and complications include:    · Problems due to the use of  anesthetics.  · Blood loss and replacement (does not apply to minor surgical procedures).  · Temporary increase in pain due to surgery.  · Uncorrected pain or problems that the surgery was meant to correct.  · Infection.  · New damage.  BEFORE THE PROCEDURE  · Ask your health care provider about changing or stopping your regular medicines. You may need to stop taking certain medicines in the days or weeks before the procedure.  · Stop smoking at least 2 weeks before surgery. This lowers your risk for complications during and after surgery. Ask your health care provider for help with this if needed.  · Eat your usual meals and a light supper the day before surgery. Continue fluid intake. Do not drink alcohol.  · Do not eat or drink after midnight the night before your surgery.   · Arrange for someone to take you home and to stay with you for 24 hours after the procedure. Medicine given for your procedure may affect your ability to drive or to care for yourself.  · Call your health care provider's office if you develop an illness or problem that may prevent you from safely having your procedure.  AFTER THE PROCEDURE  After surgery, you will be taken to a recovery area, where your progress will be monitored. If there are no complications, you will be allowed to go home when you are awake, stable, and taking fluids well. You may have numbness around the surgical site. Healing will take some time. You will have tenderness at the surgical site and may have some swelling and bruising. You may also have some nausea.  HOME CARE INSTRUCTIONS  · Do not drive for 24 hours, or as directed by your health care provider. Do not drive while taking prescription pain medicines.  · Do not drink alcohol for 24 hours.  · Do not make important decisions or sign legal documents for 24 hours.  · You may resume a normal diet and activities as directed.  · Do not lift anything heavier than 10 pounds (4.5 kg) or play contact sports until your  health care provider says it is okay.  · Change your bandages (dressings) as directed.  · Only take over-the-counter or prescription medicines as directed by your health care provider.  · Follow up with your health care provider as directed.  SEEK MEDICAL CARE IF:  · You have increased bleeding (more than a small spot) from the surgical site.  · You have redness, swelling, or increasing pain in the wound.  · You see pus coming from the wound.  · You have a fever.  · You notice a bad smell coming from the wound or dressing.  · You feel lightheaded or faint.  · You develop a rash.  · You have trouble breathing.  · You develop allergies.  MAKE SURE YOU:  · Understand these instructions.  · Will watch your condition.  · Will get help right away if you are not doing well or get worse.     This information is not intended to replace advice given to you by your health care provider. Make sure you discuss any questions you have with your health care provider.     Document Released: 09/12/2002 Document Revised: 05/03/2016 Document Reviewed: 05/22/2014  [employer] Interactive Patient Education ©2016 [employer]       Fall Prevention in Hospitals, Adult  As a hospital patient, your condition and the treatments you receive can increase your risk for falls. Some additional risk factors for falls in a hospital include:  · Being in an unfamiliar environment.  · Being on bed rest.  · Your surgery.  · Taking certain medicines.  · Your tubing requirements, such as intravenous (IV) therapy or catheters.  It is important that you learn how to decrease fall risks while at the hospital. Below are important tips that can help prevent falls.  SAFETY TIPS FOR PREVENTING FALLS  Talk about your risk of falling.  · Ask your health care provider why you are at risk for falling. Is it your medicine, illness, tubing placement, or something else?  · Make a plan with your health care provider to keep you safe from falls.  · Ask your health care  provider or pharmacist about side effects of your medicines. Some medicines can make you dizzy or affect your coordination.  Ask for help.  · Ask for help before getting out of bed. You may need to press your call button.  · Ask for assistance in getting safely to the toilet.  · Ask for a walker or cane to be put at your bedside. Ask that most of the side rails on your bed be placed up before your health care provider leaves the room.  · Ask family or friends to sit with you.  · Ask for things that are out of your reach, such as your glasses, hearing aids, telephone, bedside table, or call button.  Follow these tips to avoid falling:  · Stay lying or seated, rather than standing, while waiting for help.  · Wear rubber-soled slippers or shoes whenever you walk in the hospital.  · Avoid quick, sudden movements.  ¨ Change positions slowly.  ¨ Sit on the side of your bed before standing.  ¨ Stand up slowly and wait before you start to walk.  · Let your health care provider know if there is a spill on the floor.  · Pay careful attention to the medical equipment, electrical cords, and tubes around you.  · When you need help, use your call button by your bed or in the bathroom. Wait for one of your health care providers to help you.  · If you feel dizzy or unsure of your footing, return to bed and wait for assistance.  · Avoid being distracted by the TV, telephone, or another person in your room.  · Do not lean or support yourself on rolling objects, such as IV poles or bedside tables.     This information is not intended to replace advice given to you by your health care provider. Make sure you discuss any questions you have with your health care provider.     Document Released: 12/15/2001 Document Revised: 01/08/2016 Document Reviewed: 08/25/2013  [employer] Interactive Patient Education ©2016 [employer]       Surgical Site Infections FAQs  What is a Surgical Site Infection (SSI)?  A surgical site infection is an  infection that occurs after surgery in the part of the body where the surgery took place. Most patients who have surgery do not develop an infection. However, infections develop in about 1 to 3 out of every 100 patients who have surgery.  Some of the common symptoms of a surgical site infection are:  · Redness and pain around the area where you had surgery  · Drainage of cloudy fluid from your surgical wound  · Fever  Can SSIs be treated?  Yes. Most surgical site infections can be treated with antibiotics. The antibiotic given to you depends on the bacteria (germs) causing the infection. Sometimes patients with SSIs also need another surgery to treat the infection.  What are some of the things that hospitals are doing to prevent SSIs?  To prevent SSIs, doctors, nurses, and other healthcare providers:  · Clean their hands and arms up to their elbows with an antiseptic agent just before the surgery.  · Clean their hands with soap and water or an alcohol-based hand rub before and after caring for each patient.  · May remove some of your hair immediately before your surgery using electric clippers if the hair is in the same area where the procedure will occur. They should not shave you with a razor.  · Wear special hair covers, masks, gowns, and gloves during surgery to keep the surgery area clean.  · Give you antibiotics before your surgery starts. In most cases, you should get antibiotics within 60 minutes before the surgery starts and the antibiotics should be stopped within 24 hours after surgery.  · Clean the skin at the site of your surgery with a special soap that kills germs.  What can I do to help prevent SSIs?  Before your surgery:  · Tell your doctor about other medical problems you may have. Health problems such as allergies, diabetes, and obesity could affect your surgery and your treatment.  · Quit smoking. Patients who smoke get more infections. Talk to your doctor about how you can quit before your  surgery.  · Do not shave near where you will have surgery. Shaving with a razor can irritate your skin and make it easier to develop an infection.  At the time of your surgery:  · Speak up if someone tries to shave you with a razor before surgery. Ask why you need to be shaved and talk with your surgeon if you have any concerns.  · Ask if you will get antibiotics before surgery.  After your surgery:  · Make sure that your healthcare providers clean their hands before examining you, either with soap and water or an alcohol-based hand rub.  · If you do not see your providers clean their hands, please ask them to do so.  · Family and friends who visit you should not touch the surgical wound or dressings.  · Family and friends should clean their hands with soap and water or an alcohol-based hand rub before and after visiting you. If you do not see them clean their hands, ask them to clean their hands.  What do I need to do when I go home from the hospital?  · Before you go home, your doctor or nurse should explain everything you need to know about taking care of your wound. Make sure you understand how to care for your wound before you leave the hospital.  · Always clean your hands before and after caring for your wound.  · Before you go home, make sure you know who to contact if you have questions or problems after you get home.  · If you have any symptoms of an infection, such as redness and pain at the surgery site, drainage, or fever, call your doctor immediately.  If you have additional questions, please ask your doctor or nurse.  Developed and co-sponsored by The Society for Healthcare Epidemiology of [place] (SHEA); Infectious Diseases Society of [place] (IDSA); American Hospital Association; Association for Professionals in Infection Control and Epidemiology (APIC); Centers for Disease Control and Prevention (CDC); and The Joint Commission.     This information is not intended to replace advice given to you by  your health care provider. Make sure you discuss any questions you have with your health care provider.     Document Released: 12/23/2014 Document Revised: 01/08/2016 Document Reviewed: 03/02/2016  [employer] Interactive Patient Education ©2016 [employer]     PATIENT/FAMILY/RESPONSIBLE PARTY VERBALIZES UNDERSTANDING OF ABOVE EDUCATION.  COPY OF PAIN SCALE GIVEN AND REVIEWED WITH VERBALIZED UNDERSTANDING.

## 2018-11-14 ENCOUNTER — ANESTHESIA EVENT (OUTPATIENT)
Dept: PERIOP | Facility: HOSPITAL | Age: 58
End: 2018-11-14

## 2018-11-14 ENCOUNTER — ANESTHESIA (OUTPATIENT)
Dept: PERIOP | Facility: HOSPITAL | Age: 58
End: 2018-11-14

## 2018-11-14 ENCOUNTER — HOSPITAL ENCOUNTER (OUTPATIENT)
Facility: HOSPITAL | Age: 58
Setting detail: HOSPITAL OUTPATIENT SURGERY
Discharge: HOME | End: 2018-11-14
Attending: UROLOGY | Admitting: UROLOGY

## 2018-11-14 ENCOUNTER — APPOINTMENT (OUTPATIENT)
Dept: GENERAL RADIOLOGY | Facility: HOSPITAL | Age: 58
End: 2018-11-14

## 2018-11-14 VITALS
DIASTOLIC BLOOD PRESSURE: 90 MMHG | OXYGEN SATURATION: 95 % | SYSTOLIC BLOOD PRESSURE: 144 MMHG | HEART RATE: 64 BPM | RESPIRATION RATE: 14 BRPM | TEMPERATURE: 97.2 F

## 2018-11-14 DIAGNOSIS — N20.1 RIGHT URETERAL STONE: ICD-10-CM

## 2018-11-14 PROCEDURE — 25010000002 PROPOFOL 10 MG/ML EMULSION: Performed by: NURSE ANESTHETIST, CERTIFIED REGISTERED

## 2018-11-14 PROCEDURE — 25010000002 DEXAMETHASONE PER 1 MG: Performed by: NURSE ANESTHETIST, CERTIFIED REGISTERED

## 2018-11-14 PROCEDURE — 25010000002 IOPAMIDOL 61 % SOLUTION: Performed by: UROLOGY

## 2018-11-14 PROCEDURE — 52332 CYSTOSCOPY AND TREATMENT: CPT | Performed by: UROLOGY

## 2018-11-14 PROCEDURE — C1769 GUIDE WIRE: HCPCS | Performed by: UROLOGY

## 2018-11-14 PROCEDURE — 25010000002 FENTANYL CITRATE (PF) 100 MCG/2ML SOLUTION: Performed by: NURSE ANESTHETIST, CERTIFIED REGISTERED

## 2018-11-14 PROCEDURE — C1758 CATHETER, URETERAL: HCPCS | Performed by: UROLOGY

## 2018-11-14 PROCEDURE — 74420 UROGRAPHY RTRGR +-KUB: CPT | Performed by: UROLOGY

## 2018-11-14 PROCEDURE — C2617 STENT, NON-COR, TEM W/O DEL: HCPCS | Performed by: UROLOGY

## 2018-11-14 PROCEDURE — 25010000002 ONDANSETRON PER 1 MG: Performed by: NURSE ANESTHETIST, CERTIFIED REGISTERED

## 2018-11-14 PROCEDURE — 52351 CYSTOURETERO & OR PYELOSCOPE: CPT | Performed by: UROLOGY

## 2018-11-14 PROCEDURE — 74420 UROGRAPHY RTRGR +-KUB: CPT

## 2018-11-14 PROCEDURE — 25010000002 NEOSTIGMINE PER 0.5 MG: Performed by: NURSE ANESTHETIST, CERTIFIED REGISTERED

## 2018-11-14 DEVICE — URETERAL STENT
Type: IMPLANTABLE DEVICE | Site: URETER | Status: FUNCTIONAL
Brand: PERCUFLEX™ PLUS

## 2018-11-14 RX ORDER — LIDOCAINE HYDROCHLORIDE 40 MG/ML
SOLUTION TOPICAL AS NEEDED
Status: DISCONTINUED | OUTPATIENT
Start: 2018-11-14 | End: 2018-11-14 | Stop reason: SURG

## 2018-11-14 RX ORDER — METOCLOPRAMIDE HYDROCHLORIDE 5 MG/ML
5 INJECTION INTRAMUSCULAR; INTRAVENOUS
Status: DISCONTINUED | OUTPATIENT
Start: 2018-11-14 | End: 2018-11-14 | Stop reason: HOSPADM

## 2018-11-14 RX ORDER — PROPOFOL 10 MG/ML
VIAL (ML) INTRAVENOUS AS NEEDED
Status: DISCONTINUED | OUTPATIENT
Start: 2018-11-14 | End: 2018-11-14 | Stop reason: SURG

## 2018-11-14 RX ORDER — LABETALOL HYDROCHLORIDE 5 MG/ML
5 INJECTION, SOLUTION INTRAVENOUS
Status: DISCONTINUED | OUTPATIENT
Start: 2018-11-14 | End: 2018-11-14 | Stop reason: HOSPADM

## 2018-11-14 RX ORDER — NALOXONE HCL 0.4 MG/ML
0.4 VIAL (ML) INJECTION AS NEEDED
Status: DISCONTINUED | OUTPATIENT
Start: 2018-11-14 | End: 2018-11-14 | Stop reason: HOSPADM

## 2018-11-14 RX ORDER — SODIUM CHLORIDE, SODIUM LACTATE, POTASSIUM CHLORIDE, CALCIUM CHLORIDE 600; 310; 30; 20 MG/100ML; MG/100ML; MG/100ML; MG/100ML
1000 INJECTION, SOLUTION INTRAVENOUS CONTINUOUS
Status: DISCONTINUED | OUTPATIENT
Start: 2018-11-14 | End: 2018-11-14 | Stop reason: HOSPADM

## 2018-11-14 RX ORDER — MIDAZOLAM HYDROCHLORIDE 1 MG/ML
2 INJECTION INTRAMUSCULAR; INTRAVENOUS
Status: DISCONTINUED | OUTPATIENT
Start: 2018-11-14 | End: 2018-11-14 | Stop reason: HOSPADM

## 2018-11-14 RX ORDER — LIDOCAINE HYDROCHLORIDE 20 MG/ML
INJECTION, SOLUTION INFILTRATION; PERINEURAL AS NEEDED
Status: DISCONTINUED | OUTPATIENT
Start: 2018-11-14 | End: 2018-11-14 | Stop reason: SURG

## 2018-11-14 RX ORDER — OXYCODONE AND ACETAMINOPHEN 10; 325 MG/1; MG/1
1 TABLET ORAL ONCE AS NEEDED
Status: DISCONTINUED | OUTPATIENT
Start: 2018-11-14 | End: 2018-11-14 | Stop reason: HOSPADM

## 2018-11-14 RX ORDER — SODIUM CHLORIDE, SODIUM LACTATE, POTASSIUM CHLORIDE, CALCIUM CHLORIDE 600; 310; 30; 20 MG/100ML; MG/100ML; MG/100ML; MG/100ML
100 INJECTION, SOLUTION INTRAVENOUS CONTINUOUS
Status: DISCONTINUED | OUTPATIENT
Start: 2018-11-14 | End: 2018-11-14 | Stop reason: HOSPADM

## 2018-11-14 RX ORDER — MIDAZOLAM HYDROCHLORIDE 1 MG/ML
1 INJECTION INTRAMUSCULAR; INTRAVENOUS
Status: DISCONTINUED | OUTPATIENT
Start: 2018-11-14 | End: 2018-11-14 | Stop reason: HOSPADM

## 2018-11-14 RX ORDER — OXYCODONE HYDROCHLORIDE AND ACETAMINOPHEN 5; 325 MG/1; MG/1
1 TABLET ORAL ONCE AS NEEDED
Status: DISCONTINUED | OUTPATIENT
Start: 2018-11-14 | End: 2018-11-14 | Stop reason: HOSPADM

## 2018-11-14 RX ORDER — SODIUM CHLORIDE 0.9 % (FLUSH) 0.9 %
3 SYRINGE (ML) INJECTION AS NEEDED
Status: DISCONTINUED | OUTPATIENT
Start: 2018-11-14 | End: 2018-11-14 | Stop reason: HOSPADM

## 2018-11-14 RX ORDER — HYDROCHLOROTHIAZIDE 25 MG/1
TABLET ORAL
Qty: 30 TABLET | Refills: 9 | Status: SHIPPED | OUTPATIENT
Start: 2018-11-14 | End: 2019-07-16

## 2018-11-14 RX ORDER — IBUPROFEN 600 MG/1
600 TABLET ORAL ONCE AS NEEDED
Status: DISCONTINUED | OUTPATIENT
Start: 2018-11-14 | End: 2018-11-14 | Stop reason: HOSPADM

## 2018-11-14 RX ORDER — FENTANYL CITRATE 50 UG/ML
25 INJECTION, SOLUTION INTRAMUSCULAR; INTRAVENOUS AS NEEDED
Status: DISCONTINUED | OUTPATIENT
Start: 2018-11-14 | End: 2018-11-14 | Stop reason: HOSPADM

## 2018-11-14 RX ORDER — MAGNESIUM HYDROXIDE 1200 MG/15ML
LIQUID ORAL AS NEEDED
Status: DISCONTINUED | OUTPATIENT
Start: 2018-11-14 | End: 2018-11-14 | Stop reason: HOSPADM

## 2018-11-14 RX ORDER — OXYCODONE AND ACETAMINOPHEN 7.5; 325 MG/1; MG/1
2 TABLET ORAL ONCE AS NEEDED
Status: COMPLETED | OUTPATIENT
Start: 2018-11-14 | End: 2018-11-14

## 2018-11-14 RX ORDER — SODIUM CHLORIDE 0.9 % (FLUSH) 0.9 %
3 SYRINGE (ML) INJECTION EVERY 12 HOURS SCHEDULED
Status: DISCONTINUED | OUTPATIENT
Start: 2018-11-14 | End: 2018-11-14 | Stop reason: HOSPADM

## 2018-11-14 RX ORDER — FENTANYL CITRATE 50 UG/ML
INJECTION, SOLUTION INTRAMUSCULAR; INTRAVENOUS AS NEEDED
Status: DISCONTINUED | OUTPATIENT
Start: 2018-11-14 | End: 2018-11-14 | Stop reason: SURG

## 2018-11-14 RX ORDER — BUPIVACAINE HCL/0.9 % NACL/PF 0.1 %
2 PLASTIC BAG, INJECTION (ML) EPIDURAL ONCE
Status: COMPLETED | OUTPATIENT
Start: 2018-11-14 | End: 2018-11-14

## 2018-11-14 RX ORDER — OXYBUTYNIN CHLORIDE 5 MG/1
5 TABLET ORAL 3 TIMES DAILY PRN
Qty: 30 TABLET | Refills: 0 | Status: SHIPPED | OUTPATIENT
Start: 2018-11-14 | End: 2018-12-30 | Stop reason: SDUPTHER

## 2018-11-14 RX ORDER — KETOROLAC TROMETHAMINE 10 MG/1
10 TABLET, FILM COATED ORAL EVERY 6 HOURS PRN
Qty: 20 TABLET | Refills: 0 | Status: SHIPPED | OUTPATIENT
Start: 2018-11-14 | End: 2020-05-01

## 2018-11-14 RX ORDER — DEXAMETHASONE SODIUM PHOSPHATE 4 MG/ML
INJECTION, SOLUTION INTRA-ARTICULAR; INTRALESIONAL; INTRAMUSCULAR; INTRAVENOUS; SOFT TISSUE AS NEEDED
Status: DISCONTINUED | OUTPATIENT
Start: 2018-11-14 | End: 2018-11-14 | Stop reason: SURG

## 2018-11-14 RX ORDER — PHENYLEPHRINE HCL IN 0.9% NACL 0.8MG/10ML
SYRINGE (ML) INTRAVENOUS AS NEEDED
Status: DISCONTINUED | OUTPATIENT
Start: 2018-11-14 | End: 2018-11-14 | Stop reason: SURG

## 2018-11-14 RX ORDER — IPRATROPIUM BROMIDE AND ALBUTEROL SULFATE 2.5; .5 MG/3ML; MG/3ML
3 SOLUTION RESPIRATORY (INHALATION) ONCE
Status: COMPLETED | OUTPATIENT
Start: 2018-11-14 | End: 2018-11-14

## 2018-11-14 RX ORDER — ONDANSETRON 2 MG/ML
4 INJECTION INTRAMUSCULAR; INTRAVENOUS ONCE AS NEEDED
Status: DISCONTINUED | OUTPATIENT
Start: 2018-11-14 | End: 2018-11-14 | Stop reason: HOSPADM

## 2018-11-14 RX ORDER — ONDANSETRON 2 MG/ML
INJECTION INTRAMUSCULAR; INTRAVENOUS AS NEEDED
Status: DISCONTINUED | OUTPATIENT
Start: 2018-11-14 | End: 2018-11-14 | Stop reason: SURG

## 2018-11-14 RX ORDER — GLYCOPYRROLATE 0.2 MG/ML
INJECTION INTRAMUSCULAR; INTRAVENOUS AS NEEDED
Status: DISCONTINUED | OUTPATIENT
Start: 2018-11-14 | End: 2018-11-14 | Stop reason: SURG

## 2018-11-14 RX ORDER — ACETAMINOPHEN 500 MG
1000 TABLET ORAL ONCE
Status: COMPLETED | OUTPATIENT
Start: 2018-11-14 | End: 2018-11-14

## 2018-11-14 RX ORDER — SODIUM CHLORIDE 0.9 % (FLUSH) 0.9 %
1-10 SYRINGE (ML) INJECTION AS NEEDED
Status: DISCONTINUED | OUTPATIENT
Start: 2018-11-14 | End: 2018-11-14 | Stop reason: HOSPADM

## 2018-11-14 RX ORDER — ROCURONIUM BROMIDE 10 MG/ML
INJECTION, SOLUTION INTRAVENOUS AS NEEDED
Status: DISCONTINUED | OUTPATIENT
Start: 2018-11-14 | End: 2018-11-14 | Stop reason: SURG

## 2018-11-14 RX ORDER — IPRATROPIUM BROMIDE AND ALBUTEROL SULFATE 2.5; .5 MG/3ML; MG/3ML
3 SOLUTION RESPIRATORY (INHALATION) ONCE AS NEEDED
Status: DISCONTINUED | OUTPATIENT
Start: 2018-11-14 | End: 2018-11-14 | Stop reason: HOSPADM

## 2018-11-14 RX ORDER — MEPERIDINE HYDROCHLORIDE 25 MG/ML
12.5 INJECTION INTRAMUSCULAR; INTRAVENOUS; SUBCUTANEOUS
Status: DISCONTINUED | OUTPATIENT
Start: 2018-11-14 | End: 2018-11-14 | Stop reason: HOSPADM

## 2018-11-14 RX ADMIN — ACETAMINOPHEN 1000 MG: 500 TABLET ORAL at 10:40

## 2018-11-14 RX ADMIN — LIDOCAINE HYDROCHLORIDE 0.5 ML: 10 INJECTION, SOLUTION EPIDURAL; INFILTRATION; INTRACAUDAL; PERINEURAL at 10:13

## 2018-11-14 RX ADMIN — SODIUM CHLORIDE, POTASSIUM CHLORIDE, SODIUM LACTATE AND CALCIUM CHLORIDE 1000 ML: 600; 310; 30; 20 INJECTION, SOLUTION INTRAVENOUS at 10:13

## 2018-11-14 RX ADMIN — LIDOCAINE HYDROCHLORIDE 1 EACH: 40 SOLUTION TOPICAL at 11:53

## 2018-11-14 RX ADMIN — Medication 3 MG: at 12:13

## 2018-11-14 RX ADMIN — OXYCODONE HYDROCHLORIDE AND ACETAMINOPHEN 2 TABLET: 7.5; 325 TABLET ORAL at 12:43

## 2018-11-14 RX ADMIN — IPRATROPIUM BROMIDE AND ALBUTEROL SULFATE 3 ML: 2.5; .5 SOLUTION RESPIRATORY (INHALATION) at 10:40

## 2018-11-14 RX ADMIN — PROPOFOL 150 MG: 10 INJECTION, EMULSION INTRAVENOUS at 11:51

## 2018-11-14 RX ADMIN — FENTANYL CITRATE 100 MCG: 50 INJECTION, SOLUTION INTRAMUSCULAR; INTRAVENOUS at 11:51

## 2018-11-14 RX ADMIN — DEXAMETHASONE SODIUM PHOSPHATE 4 MG: 4 INJECTION, SOLUTION INTRAMUSCULAR; INTRAVENOUS at 11:59

## 2018-11-14 RX ADMIN — Medication 2 G: at 11:55

## 2018-11-14 RX ADMIN — LIDOCAINE HYDROCHLORIDE 100 MG: 20 INJECTION, SOLUTION INFILTRATION; PERINEURAL at 11:51

## 2018-11-14 RX ADMIN — Medication 80 MCG: at 11:59

## 2018-11-14 RX ADMIN — ONDANSETRON HYDROCHLORIDE 4 MG: 2 SOLUTION INTRAMUSCULAR; INTRAVENOUS at 11:58

## 2018-11-14 RX ADMIN — ROCURONIUM BROMIDE 20 MG: 10 INJECTION INTRAVENOUS at 11:51

## 2018-11-14 RX ADMIN — GLYCOPYRROLATE 0.4 MG: 0.2 INJECTION, SOLUTION INTRAMUSCULAR; INTRAVENOUS at 12:13

## 2018-11-14 NOTE — ANESTHESIA PROCEDURE NOTES
ANESTHESIA INTUBATION  Urgency: elective    Airway not difficult    General Information and Staff    Patient location during procedure: OR  CRNA: Juan Bowles CRNA    Indications and Patient Condition  Indications for airway management: airway protection    Preoxygenated: yes  MILS maintained throughout  Mask difficulty assessment: 2 - vent by mask + OA or adjuvant +/- NMBA    Final Airway Details  Final airway type: endotracheal airway      Successful airway: ETT  Cuffed: yes   Successful intubation technique: direct laryngoscopy  Endotracheal tube insertion site: oral  Blade: Blake  Blade size: 2  ETT size (mm): 7.0  Cormack-Lehane Classification: grade I - full view of glottis  Placement verified by: chest auscultation and capnometry   Cuff volume (mL): 8  Measured from: lips  ETT to lips (cm): 22  Number of attempts at approach: 1

## 2018-11-14 NOTE — DISCHARGE INSTRUCTIONS

## 2018-11-14 NOTE — ANESTHESIA POSTPROCEDURE EVALUATION
Patient: Akua Aguilar    Procedure Summary     Date:  11/14/18 Room / Location:   [facility]  /  [facility]    Anesthesia Start:  1148 Anesthesia Stop:  1220    Procedure:  URETEROSCOPY  WITH STENT INSERTION RETROGRADE PYELOGRAM (Right Ureter) Diagnosis:       Right ureteral stone      (Right ureteral stone [N20.1])    Surgeon:  Tyrell Hardin MD Provider:  Juan Bowles CRNA    Anesthesia Type:  general ASA Status:  3          Anesthesia Type: general  Last vitals  BP   144/90 (11/14/18 1400)   Temp   97.2 °F (36.2 °C) (11/14/18 1246)   Pulse   64 (11/14/18 1400)   Resp   14 (11/14/18 1400)     SpO2   95 % (11/14/18 1400)     Post Anesthesia Care and Evaluation    PONV Status: none  Comments: Patient d/c from PACU prior to anes eval based on Abdi score.  Please see RN notes for details of d/c criteria.    Blood pressure 144/90, pulse 64, temperature 97.2 °F (36.2 °C), temperature source Temporal, resp. rate 14, SpO2 95 %, not currently breastfeeding.

## 2018-11-14 NOTE — OP NOTE
URETEROSCOPY LASER LITHOTRIPSY WITH STENT INSERTION  Procedure Note    Akua Aguilar  11/14/2018    Pre-op Diagnosis:   Right ureteral stone [N20.1]    Post-op Diagnosis:     Post-Op Diagnosis Codes:     * Right ureteral stone [N20.1]    Procedure/CPT® Codes:      Procedure(s):  URETEROSCOPY  WITH STENT INSERTION RETROGRADE PYELOGRAM    Surgeon(s):  Tyrell Hardin MD    Anesthesia: General    Staff:   Circulator: Rebecca Toure RN  Scrub Person: Estelita Robin; Alban Doty    Indications for procedure:  Distal right ureteral stone with intractable pain    Procedure details:  Patient identified in preoperative anesthesia care unit.  She was then brought back the operating theater and after induction of general anesthetic a proper timeout was performed.  After all were in agreement of patient procedure, patient was prepped and draped in normal sterile fashion.  22 Cymraes cystoscope was used per urethra.  Cystoscopy revealed no lesions in the bladder.  Orifices in orthotopic position.  I divided the right ureteral orifice with a cone-tipped catheter and a retrograde pyelogram was performed which we dictated later portions procedure.  I intubated the right ureteral orifice with a sensor tip wire which was seen to coil fluoroscopically level kidney I then perform rigid ureteroscopy alongside this wire and salt no stones in the distal ureter.  I perform ureteroscopy up past the level of the vessels and again saw no stones.  This point I I came to the conclusion that she likely passed her stone and removed the ureteroscope over the existing wire placed a 6 Cymraes by 24 cm double-J ureteral stent.  The string was left in place and the wire was removed and a coil seen fluoroscopically level kidney visual and the bladder.  The bladder was emptied and the string of this was affixed the patient's leg and the procedure is ended    Right retrograde pyelogram read:  Contrast injected per cone-tip  catheter which showed normal caliber distal mid and proximal ureter without any significant hydronephrosis on the right.    Estimated Blood Loss: minimal    Specimens:                None      Drains:      Complications: none    Follow up:   Visit Monday for stent removal follow-up in 6 weeks Dr. Hardin with renal ultrasound same day    Tyrell Hardin MD     Date: 11/14/2018  Time: 12:24 PM

## 2018-11-15 RX ORDER — CLONIDINE HYDROCHLORIDE 0.1 MG/1
0.1 TABLET ORAL 2 TIMES DAILY
Qty: 60 TABLET | Refills: 9 | Status: SHIPPED | OUTPATIENT
Start: 2018-11-15 | End: 2019-08-27

## 2018-11-15 NOTE — TELEPHONE ENCOUNTER
Refill request for Clonidine 0.1 mg PO BID, Quantity: 60, Refills: 9. HL  
Follow up with Dr. Alford on Monday- July 10

## 2018-11-19 ENCOUNTER — PROCEDURE VISIT (OUTPATIENT)
Dept: UROLOGY | Facility: CLINIC | Age: 58
End: 2018-11-19

## 2018-11-19 DIAGNOSIS — N20.1 RIGHT URETERAL STONE: Primary | ICD-10-CM

## 2018-11-19 PROCEDURE — 99024 POSTOP FOLLOW-UP VISIT: CPT | Performed by: UROLOGY

## 2018-11-19 NOTE — PROGRESS NOTES
Patient of Dr. Hardin states she is here today to get her stent removed SP Ureteroscopy Laser Litho with stent placement on 11-14-18. Patient denies any fever, chills, N&V or hematuria. The tape was removed and using the string stent was pulled with no complications. The patient was advised to continue any medications prescribed in the hospital and to call if he has any questions or concerns. The patient verbalized understanding. Follow up with Dr. Hardin in 6 weeks with Renal US prior. Dr. Hardin was in the office for this procedure.

## 2018-11-30 RX ORDER — NITROGLYCERIN 0.4 MG/1
TABLET SUBLINGUAL
Qty: 30 TABLET | Refills: 8 | Status: SHIPPED | OUTPATIENT
Start: 2018-11-30 | End: 2020-12-09 | Stop reason: SDUPTHER

## 2018-12-13 RX ORDER — ATENOLOL 50 MG/1
TABLET ORAL
Qty: 30 TABLET | Refills: 8 | Status: SHIPPED | OUTPATIENT
Start: 2018-12-13 | End: 2019-09-07 | Stop reason: SDUPTHER

## 2018-12-26 ENCOUNTER — TELEPHONE (OUTPATIENT)
Dept: UROLOGY | Facility: CLINIC | Age: 58
End: 2018-12-26

## 2018-12-26 NOTE — TELEPHONE ENCOUNTER
----- Message from Victorino Cooney sent at 12/20/2018  3:53 PM CST -----  Regarding: us  Pt has not schd renal us yet, please make sure she schds this before appt next week.

## 2018-12-26 NOTE — TELEPHONE ENCOUNTER
After multiple attempts to contact the pt I moved the appt date 3 weeks out. I was unable to speak w/ the patient regarding appt date/ time. I did however, leave a voicemail with the information.

## 2018-12-30 DIAGNOSIS — N20.1 RIGHT URETERAL STONE: ICD-10-CM

## 2018-12-31 RX ORDER — OXYBUTYNIN CHLORIDE 5 MG/1
TABLET ORAL
Qty: 30 TABLET | Refills: 0 | Status: SHIPPED | OUTPATIENT
Start: 2018-12-31 | End: 2020-05-01

## 2019-01-15 RX ORDER — LISINOPRIL 40 MG/1
TABLET ORAL
Qty: 60 TABLET | Refills: 7 | Status: SHIPPED | OUTPATIENT
Start: 2019-01-15 | End: 2022-08-19

## 2019-01-16 ENCOUNTER — TELEPHONE (OUTPATIENT)
Dept: UROLOGY | Facility: CLINIC | Age: 59
End: 2019-01-16

## 2019-01-16 NOTE — TELEPHONE ENCOUNTER
Patient has cancelled and reschd her appt twice with Dr. Hardin as well as a her ultrasound appt. Per the pt she does not wish to follow back up with Dr. Hardin. I will let Dr. Hardin know this information. The patient cannot refill any future medications without seeing Dr. Hardin first.

## 2019-01-29 ENCOUNTER — TELEPHONE (OUTPATIENT)
Dept: NEUROLOGY | Age: 59
End: 2019-01-29

## 2019-02-11 RX ORDER — PANTOPRAZOLE SODIUM 40 MG/1
40 TABLET, DELAYED RELEASE ORAL DAILY
Qty: 30 TABLET | Refills: 11 | Status: SHIPPED | OUTPATIENT
Start: 2019-02-11 | End: 2020-09-28

## 2019-03-19 ENCOUNTER — OFFICE VISIT (OUTPATIENT)
Dept: UROLOGY | Age: 59
End: 2019-03-19
Payer: MEDICAID

## 2019-03-19 VITALS — BODY MASS INDEX: 34.38 KG/M2 | WEIGHT: 194 LBS | TEMPERATURE: 96.2 F | HEIGHT: 63 IN

## 2019-03-19 DIAGNOSIS — N20.0 RENAL STONE: ICD-10-CM

## 2019-03-19 DIAGNOSIS — N30.00 ACUTE CYSTITIS WITHOUT HEMATURIA: Primary | ICD-10-CM

## 2019-03-19 LAB
APPEARANCE FLUID: ABNORMAL
BILIRUBIN, POC: ABNORMAL
BLOOD URINE, POC: NEGATIVE
CLARITY, POC: CLEAR
COLOR, POC: YELLOW
GLUCOSE URINE, POC: NEGATIVE
KETONES, POC: ABNORMAL
LEUKOCYTE EST, POC: ABNORMAL
NITRITE, POC: POSITIVE
PH, POC: 5.5
PROTEIN, POC: ABNORMAL
SPECIFIC GRAVITY, POC: 1.03
UROBILINOGEN, POC: 0.2

## 2019-03-19 PROCEDURE — 99213 OFFICE O/P EST LOW 20 MIN: CPT | Performed by: NURSE PRACTITIONER

## 2019-03-19 PROCEDURE — 81002 URINALYSIS NONAUTO W/O SCOPE: CPT | Performed by: NURSE PRACTITIONER

## 2019-03-19 PROCEDURE — 4004F PT TOBACCO SCREEN RCVD TLK: CPT | Performed by: NURSE PRACTITIONER

## 2019-03-19 PROCEDURE — 3017F COLORECTAL CA SCREEN DOC REV: CPT | Performed by: NURSE PRACTITIONER

## 2019-03-19 PROCEDURE — G8417 CALC BMI ABV UP PARAM F/U: HCPCS | Performed by: NURSE PRACTITIONER

## 2019-03-19 PROCEDURE — G8484 FLU IMMUNIZE NO ADMIN: HCPCS | Performed by: NURSE PRACTITIONER

## 2019-03-19 PROCEDURE — G8427 DOCREV CUR MEDS BY ELIG CLIN: HCPCS | Performed by: NURSE PRACTITIONER

## 2019-03-19 RX ORDER — PHENAZOPYRIDINE HYDROCHLORIDE 100 MG/1
100 TABLET, FILM COATED ORAL 3 TIMES DAILY PRN
Qty: 9 TABLET | Refills: 0 | Status: SHIPPED | OUTPATIENT
Start: 2019-03-19 | End: 2019-03-22

## 2019-03-19 RX ORDER — CEFDINIR 300 MG/1
300 CAPSULE ORAL 2 TIMES DAILY
Qty: 20 CAPSULE | Refills: 0 | Status: SHIPPED | OUTPATIENT
Start: 2019-03-19 | End: 2019-03-29

## 2019-03-20 DIAGNOSIS — N30.00 ACUTE CYSTITIS WITHOUT HEMATURIA: ICD-10-CM

## 2019-03-22 ENCOUNTER — OFFICE VISIT (OUTPATIENT)
Dept: NEUROLOGY | Age: 59
End: 2019-03-22
Payer: MEDICAID

## 2019-03-22 VITALS
OXYGEN SATURATION: 93 % | BODY MASS INDEX: 34.2 KG/M2 | WEIGHT: 193 LBS | HEART RATE: 69 BPM | HEIGHT: 63 IN | DIASTOLIC BLOOD PRESSURE: 86 MMHG | SYSTOLIC BLOOD PRESSURE: 132 MMHG

## 2019-03-22 DIAGNOSIS — G47.33 OBSTRUCTIVE SLEEP APNEA: Primary | ICD-10-CM

## 2019-03-22 DIAGNOSIS — R41.3 MEMORY LOSS: ICD-10-CM

## 2019-03-22 DIAGNOSIS — R06.83 SNORING: ICD-10-CM

## 2019-03-22 DIAGNOSIS — R40.0 DAYTIME SOMNOLENCE: ICD-10-CM

## 2019-03-22 DIAGNOSIS — G25.3 MYOCLONUS: ICD-10-CM

## 2019-03-22 DIAGNOSIS — M81.0 OSTEOPOROSIS, UNSPECIFIED OSTEOPOROSIS TYPE, UNSPECIFIED PATHOLOGICAL FRACTURE PRESENCE: ICD-10-CM

## 2019-03-22 DIAGNOSIS — G25.0 ESSENTIAL TREMOR: ICD-10-CM

## 2019-03-22 DIAGNOSIS — R06.81 WITNESSED APNEIC SPELLS: ICD-10-CM

## 2019-03-22 LAB
ORGANISM: ABNORMAL
URINE CULTURE, ROUTINE: ABNORMAL
URINE CULTURE, ROUTINE: ABNORMAL

## 2019-03-22 PROCEDURE — 99214 OFFICE O/P EST MOD 30 MIN: CPT | Performed by: PHYSICIAN ASSISTANT

## 2019-03-22 PROCEDURE — G8484 FLU IMMUNIZE NO ADMIN: HCPCS | Performed by: PHYSICIAN ASSISTANT

## 2019-03-22 PROCEDURE — 4004F PT TOBACCO SCREEN RCVD TLK: CPT | Performed by: PHYSICIAN ASSISTANT

## 2019-03-22 PROCEDURE — G8417 CALC BMI ABV UP PARAM F/U: HCPCS | Performed by: PHYSICIAN ASSISTANT

## 2019-03-22 PROCEDURE — 3017F COLORECTAL CA SCREEN DOC REV: CPT | Performed by: PHYSICIAN ASSISTANT

## 2019-03-22 PROCEDURE — G8427 DOCREV CUR MEDS BY ELIG CLIN: HCPCS | Performed by: PHYSICIAN ASSISTANT

## 2019-03-27 ENCOUNTER — TELEPHONE (OUTPATIENT)
Dept: UROLOGY | Age: 59
End: 2019-03-27

## 2019-03-28 ENCOUNTER — TELEPHONE (OUTPATIENT)
Dept: UROLOGY | Age: 59
End: 2019-03-28

## 2019-03-28 ENCOUNTER — HOSPITAL ENCOUNTER (OUTPATIENT)
Dept: CT IMAGING | Age: 59
Discharge: HOME | End: 2019-03-28
Payer: MEDICAID

## 2019-03-28 ENCOUNTER — HOSPITAL ENCOUNTER (OUTPATIENT)
Dept: GENERAL RADIOLOGY | Age: 59
Discharge: HOME | End: 2019-03-28
Payer: MEDICAID

## 2019-03-28 ENCOUNTER — HOSPITAL ENCOUNTER (OUTPATIENT)
Dept: CT IMAGING | Age: 59
End: 2019-03-28
Payer: MEDICAID

## 2019-03-28 ENCOUNTER — OFFICE VISIT (OUTPATIENT)
Dept: UROLOGY | Age: 59
End: 2019-03-28
Payer: MEDICAID

## 2019-03-28 VITALS — BODY MASS INDEX: 34.91 KG/M2 | WEIGHT: 197 LBS | HEIGHT: 63 IN | TEMPERATURE: 98 F

## 2019-03-28 DIAGNOSIS — G25.3 MYOCLONUS: ICD-10-CM

## 2019-03-28 DIAGNOSIS — M50.321 OTHER CERVICAL DISC DEGENERATION AT C4-C5 LEVEL: ICD-10-CM

## 2019-03-28 DIAGNOSIS — M48.061 SPINAL STENOSIS OF LUMBAR REGION, UNSPECIFIED WHETHER NEUROGENIC CLAUDICATION PRESENT: ICD-10-CM

## 2019-03-28 DIAGNOSIS — M50.31 OTHER CERVICAL DISC DEGENERATION, HIGH CERVICAL REGION: ICD-10-CM

## 2019-03-28 DIAGNOSIS — G25.0 ESSENTIAL TREMOR: ICD-10-CM

## 2019-03-28 DIAGNOSIS — M81.0 OSTEOPOROSIS, UNSPECIFIED OSTEOPOROSIS TYPE, UNSPECIFIED PATHOLOGICAL FRACTURE PRESENCE: ICD-10-CM

## 2019-03-28 DIAGNOSIS — M51.36 LUMBAR DEGENERATIVE DISC DISEASE: ICD-10-CM

## 2019-03-28 DIAGNOSIS — R10.84 GENERALIZED ABDOMINAL PAIN: ICD-10-CM

## 2019-03-28 DIAGNOSIS — M47.816 LUMBAR FACET JOINT SYNDROME: ICD-10-CM

## 2019-03-28 DIAGNOSIS — M47.812 FACET JOINT DISEASE OF CERVICAL REGION: ICD-10-CM

## 2019-03-28 DIAGNOSIS — R41.3 MEMORY LOSS: ICD-10-CM

## 2019-03-28 DIAGNOSIS — N30.00 ACUTE CYSTITIS WITHOUT HEMATURIA: Primary | ICD-10-CM

## 2019-03-28 LAB
ALBUMIN SERPL-MCNC: 4.4 G/DL (ref 3.5–5.2)
ALP BLD-CCNC: 89 U/L (ref 35–104)
ALT SERPL-CCNC: 27 U/L (ref 5–33)
ANION GAP SERPL CALCULATED.3IONS-SCNC: 14 MMOL/L (ref 7–19)
APPEARANCE FLUID: NORMAL
AST SERPL-CCNC: 22 U/L (ref 5–32)
BASOPHILS ABSOLUTE: 0 K/UL (ref 0–0.2)
BASOPHILS RELATIVE PERCENT: 0.4 % (ref 0–1)
BILIRUB SERPL-MCNC: <0.2 MG/DL (ref 0.2–1.2)
BILIRUBIN, POC: 0
BLOOD URINE, POC: NEGATIVE
BUN BLDV-MCNC: 15 MG/DL (ref 6–20)
CALCIUM SERPL-MCNC: 9.5 MG/DL (ref 8.6–10)
CHLORIDE BLD-SCNC: 101 MMOL/L (ref 98–111)
CLARITY, POC: CLEAR
CO2: 26 MMOL/L (ref 22–29)
COLOR, POC: YELLOW
CREAT SERPL-MCNC: 0.5 MG/DL (ref 0.5–0.9)
EOSINOPHILS ABSOLUTE: 0.1 K/UL (ref 0–0.6)
EOSINOPHILS RELATIVE PERCENT: 1.4 % (ref 0–5)
FOLATE: 13.2 NG/ML (ref 4.8–37.3)
GFR NON-AFRICAN AMERICAN: >60
GLUCOSE BLD-MCNC: 82 MG/DL (ref 74–109)
GLUCOSE URINE, POC: NEGATIVE
HCT VFR BLD CALC: 51.5 % (ref 37–47)
HEMOGLOBIN: 16 G/DL (ref 12–16)
KETONES, POC: NORMAL
LEUKOCYTE EST, POC: NEGATIVE
LYMPHOCYTES ABSOLUTE: 2.6 K/UL (ref 1.1–4.5)
LYMPHOCYTES RELATIVE PERCENT: 31.8 % (ref 20–40)
MCH RBC QN AUTO: 29.1 PG (ref 27–31)
MCHC RBC AUTO-ENTMCNC: 31.1 G/DL (ref 33–37)
MCV RBC AUTO: 93.6 FL (ref 81–99)
MONOCYTES ABSOLUTE: 0.5 K/UL (ref 0–0.9)
MONOCYTES RELATIVE PERCENT: 5.8 % (ref 0–10)
NEUTROPHILS ABSOLUTE: 4.9 K/UL (ref 1.5–7.5)
NEUTROPHILS RELATIVE PERCENT: 60.4 % (ref 50–65)
NITRITE, POC: NEGATIVE
PDW BLD-RTO: 14 % (ref 11.5–14.5)
PH, POC: 5.5
PLATELET # BLD: 260 K/UL (ref 130–400)
PMV BLD AUTO: 10.9 FL (ref 9.4–12.3)
POTASSIUM SERPL-SCNC: 4.1 MMOL/L (ref 3.5–5)
PROTEIN, POC: NEGATIVE
RBC # BLD: 5.5 M/UL (ref 4.2–5.4)
SODIUM BLD-SCNC: 141 MMOL/L (ref 136–145)
SPECIFIC GRAVITY, POC: 1.03
T4 FREE: 0.9 NG/DL (ref 0.9–1.7)
TOTAL PROTEIN: 7.8 G/DL (ref 6.6–8.7)
TSH SERPL DL<=0.05 MIU/L-ACNC: 0.79 UIU/ML (ref 0.27–4.2)
UROBILINOGEN, POC: 0.2
VITAMIN B-12: 362 PG/ML (ref 211–946)
VITAMIN D 25-HYDROXY: 11.5 NG/ML
WBC # BLD: 8.1 K/UL (ref 4.8–10.8)

## 2019-03-28 PROCEDURE — G8427 DOCREV CUR MEDS BY ELIG CLIN: HCPCS | Performed by: NURSE PRACTITIONER

## 2019-03-28 PROCEDURE — 72131 CT LUMBAR SPINE W/O DYE: CPT

## 2019-03-28 PROCEDURE — 99213 OFFICE O/P EST LOW 20 MIN: CPT | Performed by: NURSE PRACTITIONER

## 2019-03-28 PROCEDURE — 72050 X-RAY EXAM NECK SPINE 4/5VWS: CPT

## 2019-03-28 PROCEDURE — 4004F PT TOBACCO SCREEN RCVD TLK: CPT | Performed by: NURSE PRACTITIONER

## 2019-03-28 PROCEDURE — 72125 CT NECK SPINE W/O DYE: CPT

## 2019-03-28 PROCEDURE — 81002 URINALYSIS NONAUTO W/O SCOPE: CPT | Performed by: NURSE PRACTITIONER

## 2019-03-28 PROCEDURE — 72110 X-RAY EXAM L-2 SPINE 4/>VWS: CPT

## 2019-03-28 PROCEDURE — 74176 CT ABD & PELVIS W/O CONTRAST: CPT

## 2019-03-28 PROCEDURE — G8417 CALC BMI ABV UP PARAM F/U: HCPCS | Performed by: NURSE PRACTITIONER

## 2019-03-28 PROCEDURE — 3017F COLORECTAL CA SCREEN DOC REV: CPT | Performed by: NURSE PRACTITIONER

## 2019-03-28 PROCEDURE — G8484 FLU IMMUNIZE NO ADMIN: HCPCS | Performed by: NURSE PRACTITIONER

## 2019-03-28 ASSESSMENT — ENCOUNTER SYMPTOMS
ABDOMINAL DISTENTION: 1
CHOKING: 0
ANAL BLEEDING: 0
WHEEZING: 0
PHOTOPHOBIA: 0
ABDOMINAL PAIN: 1
DIARRHEA: 0
ABDOMINAL DISTENTION: 0
CONSTIPATION: 0
EYE DISCHARGE: 0

## 2019-03-31 RX ORDER — ERGOCALCIFEROL 1.25 MG/1
CAPSULE ORAL
Qty: 22 CAPSULE | Refills: 0 | Status: SHIPPED | OUTPATIENT
Start: 2019-03-31

## 2019-04-01 ENCOUNTER — TELEPHONE (OUTPATIENT)
Dept: NEUROLOGY | Age: 59
End: 2019-04-01

## 2019-04-01 NOTE — TELEPHONE ENCOUNTER
----- Message from Kiana Bains sent at 3/31/2019  5:31 PM CDT -----  The labs are significant for Vitamin d deficiency. Please advise the pt that the  Vitamin D was low so I am going to  start them on weekly Vitamin D to replace that. We make vitamin D from the sun and get it from some food sources, but it is very common to be deficient. We should replace it because low Vitamin D can cause fatigue, joint aches and has been implicated in heart disease, bone disease like osteoporosis, and some other chronic illnesses. You take the replacement twice weekly for 8 weeks and then once weekly for 4 weeks. A repeat Vitamin D level will need to be obtained 4 weeks after completing the Vitamin D prescription. Call the office when you have completed the prescription. She has pending studies coming up, HST, 4/2/19, EEG, 4/9/19, and it doesn't look like the CT head has been scheduled.

## 2019-04-02 NOTE — TELEPHONE ENCOUNTER
Patient called with questions about when to take the Vitamin D on the twice weekly. Please call her to clarify.

## 2019-04-22 DIAGNOSIS — N30.00 ACUTE CYSTITIS WITHOUT HEMATURIA: Primary | ICD-10-CM

## 2019-06-17 ENCOUNTER — TELEPHONE (OUTPATIENT)
Dept: CARDIOLOGY | Facility: CLINIC | Age: 59
End: 2019-06-17

## 2019-07-16 ENCOUNTER — OFFICE VISIT (OUTPATIENT)
Dept: CARDIOLOGY | Facility: CLINIC | Age: 59
End: 2019-07-16

## 2019-07-16 VITALS
OXYGEN SATURATION: 97 % | DIASTOLIC BLOOD PRESSURE: 80 MMHG | SYSTOLIC BLOOD PRESSURE: 132 MMHG | HEIGHT: 63 IN | BODY MASS INDEX: 34.38 KG/M2 | HEART RATE: 71 BPM | WEIGHT: 194 LBS

## 2019-07-16 DIAGNOSIS — M79.89 LEG SWELLING: ICD-10-CM

## 2019-07-16 DIAGNOSIS — I25.10 NONOBSTRUCTIVE ATHEROSCLEROSIS OF CORONARY ARTERY: ICD-10-CM

## 2019-07-16 DIAGNOSIS — K21.9 GASTROESOPHAGEAL REFLUX DISEASE, ESOPHAGITIS PRESENCE NOT SPECIFIED: ICD-10-CM

## 2019-07-16 DIAGNOSIS — E78.2 MIXED HYPERLIPIDEMIA: ICD-10-CM

## 2019-07-16 DIAGNOSIS — Z72.0 TOBACCO ABUSE: ICD-10-CM

## 2019-07-16 DIAGNOSIS — R06.02 SHORTNESS OF BREATH: Primary | ICD-10-CM

## 2019-07-16 DIAGNOSIS — I77.89 ENLARGEMENT OF ABDOMINAL AORTA (HCC): ICD-10-CM

## 2019-07-16 PROCEDURE — 99214 OFFICE O/P EST MOD 30 MIN: CPT | Performed by: INTERNAL MEDICINE

## 2019-07-16 PROCEDURE — 93000 ELECTROCARDIOGRAM COMPLETE: CPT | Performed by: INTERNAL MEDICINE

## 2019-07-16 RX ORDER — ERGOCALCIFEROL 1.25 MG/1
50000 CAPSULE ORAL
COMMUNITY
Start: 2019-03-31 | End: 2021-05-18 | Stop reason: SDUPTHER

## 2019-07-16 RX ORDER — BUMETANIDE 0.5 MG/1
0.5 TABLET ORAL DAILY
Qty: 90 TABLET | Refills: 3 | Status: SHIPPED | OUTPATIENT
Start: 2019-07-16 | End: 2019-08-27 | Stop reason: SDUPTHER

## 2019-07-16 NOTE — PROGRESS NOTES
Akua Aguilar  [id number]  1960  58 y.o.  female    Referring Provider: Remy Alcaraz MD    Reason for Follow-up Visit:  Routine follow up.  Similar symptoms as during last visit       Subjective    Moderate chronic exertional shortness of breath on exertion relieved with rest  No significant cough or wheezing  Going on for several months or longer    No palpitations  No associated chest pain  No significant pedal edema    No fever or chills  No significant expectoration    No hemoptysis  No presyncope or syncope     Weight gain     Cardiac cath no coronary artery disease      Still smoking but much lesser   History of present illness:  Akua Aguilar is a 58 y.o. yo female with history of  Essential Hypertension   who presents today for   Chief Complaint   Patient presents with   • Hypertension     10 mo f/u   • Edema     feet/legs/hands   .    History  Past Medical History:   Diagnosis Date   • Arthritis    • Cancer (CMS/HCC)     CREVICAL, UTERINE   • COPD (chronic obstructive pulmonary disease) (CMS/HCC)    • Fatty liver    • GERD (gastroesophageal reflux disease)    • History of transfusion    • Hypertension    • Kidney stone    • Low back pain potentially associated with spinal stenosis    • Lupus    • Migraines    • Scoliosis    • Shortness of breath    • UTI (urinary tract infection)    ,   Past Surgical History:   Procedure Laterality Date   • BACK SURGERY      lumbar   • CARDIAC CATHETERIZATION     • CARDIAC DEFIBRILLATOR PLACEMENT     •  SECTION     • CHOLECYSTECTOMY     • COLON RESECTION       or    • COLONOSCOPY     • COLONOSCOPY N/A 5/10/2018    Procedure: COLONOSCOPY WITH ANESTHESIA;  Surgeon: Shawna Valladares MD;  Location: [facility] ENDOSCOPY;  Service: Gastroenterology   • ENDOSCOPY N/A 5/10/2018    Procedure: ESOPHAGOGASTRODUODENOSCOPY WITH ANESTHESIA;  Surgeon: Shawna Valladares MD;  Location: [facility] ENDOSCOPY;  Service: Gastroenterology   • EYE SURGERY      x  2   • HYSTERECTOMY     • LEG SURGERY Right     bars/bolts placed   • UPPER GASTROINTESTINAL ENDOSCOPY  1993   • URETEROSCOPY LASER LITHOTRIPSY WITH STENT INSERTION Right 11/14/2018    Procedure: URETEROSCOPY  WITH STENT INSERTION RETROGRADE PYELOGRAM;  Surgeon: Tyrell Hardin MD;  Location: [facility];  Service: Urology   ,   Family History   Problem Relation Age of Onset   • Cancer Mother    • Heart disease Mother    • Heart disease Father    • Lupus Sister    • Heart disease Sister    • Heart disease Sister    • No Known Problems Sister    • Hypokalemia Sister    • Alcohol abuse Sister    • Colon cancer Neg Hx    • Colon polyps Neg Hx    ,   Social History     Tobacco Use   • Smoking status: Current Every Day Smoker     Packs/day: 0.25     Years: 40.00     Pack years: 10.00     Types: Cigarettes   • Smokeless tobacco: Never Used   Substance Use Topics   • Alcohol use: No   • Drug use: No   ,     Medications  Current Outpatient Medications   Medication Sig Dispense Refill   • amLODIPine (NORVASC) 5 MG tablet TAKE 1 TABLET BY MOUTH EVERY DAY 30 tablet 10   • CloNIDine (CATAPRES) 0.1 MG tablet Take 1 tablet by mouth 2 (Two) Times a Day. 60 tablet 9   • doxepin (SINEquan) 25 MG capsule 1 CAPSULE BY MOUTH AT BEDTIME FILL ON OR AFTER 1/4/2018  1   • gabapentin (NEURONTIN) 300 MG capsule Take 300 mg by mouth 2 (Two) Times a Day.     • gabapentin (NEURONTIN) 600 MG tablet Take 900 mg by mouth Every Night.     • HYDROcodone-acetaminophen (NORCO)  MG per tablet Every 6 (Six) Hours.     • lisinopril (PRINIVIL,ZESTRIL) 40 MG tablet TAKE 1 TABLET BY MOUTH TWICE A DAY 60 tablet 7   • nitroglycerin (NITROSTAT) 0.4 MG SL tablet Take no more than 3 doses in 15 minutes. 30 tablet 8   • ondansetron ODT (ZOFRAN-ODT) 4 MG disintegrating tablet DISSOLVE 1 TABLET BY MOUTH EVERY 4 TO 6 HOURS AS NEEDED FOR NAUSEA AND VOMITING  0   • pantoprazole (PROTONIX) 40 MG EC tablet TAKE 1 TABLET BY MOUTH DAILY. 30 tablet 11   • vitamin D  "(ERGOCALCIFEROL) 06260 units capsule capsule Every 7 (Seven) Days.     • atenolol (TENORMIN) 50 MG tablet TAKE 1 TABLET BY MOUTH EVERY DAY 30 tablet 8   • bumetanide (BUMEX) 0.5 MG tablet Take 1 tablet by mouth Daily. 90 tablet 3   • ciprofloxacin (CIPRO) 500 MG tablet TAKE 1 TABLET BY MOUTH TWICE A DAY FOR 7 DAYS  0   • ketorolac (TORADOL) 10 MG tablet Take 1 tablet by mouth Every 6 (Six) Hours As Needed for Moderate Pain . 20 tablet 0   • oxybutynin (DITROPAN) 5 MG tablet TAKE 1 TABLET BY MOUTH THREE TIMES DAILY AS NEEDED FOR BLADDER SPASMS 30 tablet 0   • tamsulosin (FLOMAX) 0.4 MG capsule 24 hr capsule Take 1 capsule by mouth Daily.  0     No current facility-administered medications for this visit.        Allergies:  Aspirin and Latex    Review of Systems  Review of Systems   Constitution: Positive for weakness and malaise/fatigue.   HENT: Negative.    Eyes: Negative.    Cardiovascular: Positive for chest pain, dyspnea on exertion and leg swelling. Negative for claudication, cyanosis, irregular heartbeat, near-syncope, orthopnea, palpitations, paroxysmal nocturnal dyspnea and syncope.   Respiratory: Negative.    Endocrine: Negative.    Hematologic/Lymphatic: Negative.    Skin: Negative.    Musculoskeletal: Positive for arthritis and back pain.   Gastrointestinal: Negative for anorexia.   Genitourinary: Negative.    Psychiatric/Behavioral: Negative.        Objective     Physical Exam:  /80   Pulse 71   Ht 160 cm (63\")   Wt 88 kg (194 lb)   SpO2 97%   BMI 34.37 kg/m²   Physical Exam   Constitutional: She appears well-developed.   HENT:   Head: Normocephalic.   Neck: Normal carotid pulses and no JVD present. No tracheal tenderness present. Carotid bruit is not present. No tracheal deviation and no edema present.   Cardiovascular: Regular rhythm, normal heart sounds and normal pulses.   Pulmonary/Chest: Effort normal. No stridor.   Abdominal: Soft.   Neurological: She is alert. She has normal strength. " No cranial nerve deficit or sensory deficit.   Skin: Skin is warm.   Psychiatric: She has a normal mood and affect. Her speech is normal and behavior is normal.       Results Review:  Results for orders placed during the hospital encounter of 10/25/17   Adult Transthoracic Echo Complete W/ Cont if Necessary Per Protocol    Narrative · Left ventricular wall thickness is consistent with mild concentric   hypertrophy.  · Left ventricular systolic function is normal. Estimated EF = 60%.  · Mild pulmonary hypertension is present.        Cath 2016 : CONCLUSION:  1.   Normal ejection fraction of 55% with mild mitral regurgitation.   2.   Mildly elevated left ventricular diastolic pressure of 14 mmHg.   3.   Normal left dominant epicardial coronary arteries with 30% stenosis of the  mid left anterior descending coronary artery.   4.   Mild ectasia of the infrarenal aorta, for which serial monitoring is  required.   5.   No significant stenosis of bilateral renal arteries.   6.   Hydration, observation.   7.   Increase antianginals. Treat for microvascular angina as required down the  road.   8.   Control blood pressure.   9.   Increase clonidine from 0.1 mg daily to 0.1 mg b.i.d. and increase  atenolol from 25 to 50 mg daily.   10.  Intensive risk factor modification.   11.  Monitor the patient. If stable, can be discharged today and follow up with  me in 3 months.  12.  See Dr. Omega Wynne in 2-4 weeks.     ECG 12 Lead  Date/Time: 7/16/2019 12:49 PM  Performed by: Robert Padilla MD  Authorized by: Robert Padilla MD   Comparison: compared with previous ECG from 8/7/2018  Comparison to previous ECG: new T wave inversion inferolateral leads  Rhythm: sinus rhythm  Rate: normal  Conduction: incomplete left bundle branch block  T elevation: II, III, aVF, V5 and V6  QRS axis: normal    Clinical impression: abnormal EKG            Assessment/Plan   Akua was seen today for hypertension and edema.    Diagnoses and all orders for this  visit:    Shortness of breath  -     Adult Transthoracic Echo Complete W/ Cont if Necessary Per Protocol; Future  -     Ambulatory Referral to Pulmonology    Tobacco abuse    Nonobstructive atherosclerosis of coronary artery    Leg swelling    Mixed hyperlipidemia    Enlargement of abdominal aorta (CMS/HCC) small infra renal    Gastroesophageal reflux disease, esophagitis presence not specified    Other orders  -     ECG 12 Lead  -     bumetanide (BUMEX) 0.5 MG tablet; Take 1 tablet by mouth Daily.             Plan        Orders Placed This Encounter   Procedures   • Ambulatory Referral to Pulmonology     Referral Priority:   Routine     Referral Type:   Consultation     Referral Reason:   Specialty Services Required     Requested Specialty:   Pulmonary Disease     Number of Visits Requested:   1   • ECG 12 Lead     This order was created via procedure documentation   • Adult Transthoracic Echo Complete W/ Cont if Necessary Per Protocol     Standing Status:   Future     Standing Expiration Date:   7/15/2020     Order Specific Question:   Reason for exam?     Answer:   Dyspnea    No exertional hypoxemia    Requested Prescriptions     Signed Prescriptions Disp Refills   • bumetanide (BUMEX) 0.5 MG tablet 90 tablet 3     Sig: Take 1 tablet by mouth Daily.      Stop HCTZ     ____________________________________________________________________________________________________________________________________________  Health maintenance and recommendations      Low salt/ HTN/ Heart healthy carbohydrate restricted cardiac diet   The patient is advised to reduce or avoid caffeine or other cardiac stimulants.     The patient was advised to avoid long term NSAIDS , use Tylenol PRN instead  Avoid cardiac stimulants including common drugs like Pseudoephedrine or excessive amounts of caffeine    Monitor for any signs of bleeding including red or dark stools. Fall precautions.        Advised staying uptodate with immunizations per  established standard guidelines.      Offered to give patient  a copy      Questions were encouraged, asked and answered to the patient's  understanding and satisfaction. Questions if any regarding current medications and side effects, need for refills and importance of compliance to medications stressed.    Reviewed available prior notes, consults, prior visits, laboratory findings, radiology and cardiology relevant reports. Updated chart as applicable. I have reviewed the patient's medical history in detail and updated the computerized patient record as relevant.      Updated patient regarding any new or relevant abnormalities on review of records or any new findings on physical exam. Mentioned to patient about purpose of visit and desirable health short and long term goals and objectives.    Primary to monitor CBC CMP Lipid panel and TSH as applicable    ___________________________________________________________________________________________________________________________________________                      Return in about 6 weeks (around 8/27/2019).

## 2019-08-01 ENCOUNTER — APPOINTMENT (OUTPATIENT)
Dept: CARDIOLOGY | Facility: HOSPITAL | Age: 59
End: 2019-08-01

## 2019-08-08 ENCOUNTER — HOSPITAL ENCOUNTER (OUTPATIENT)
Dept: CARDIOLOGY | Facility: HOSPITAL | Age: 59
Discharge: HOME | End: 2019-08-08
Admitting: INTERNAL MEDICINE

## 2019-08-08 VITALS
DIASTOLIC BLOOD PRESSURE: 80 MMHG | SYSTOLIC BLOOD PRESSURE: 132 MMHG | WEIGHT: 194 LBS | HEIGHT: 63 IN | BODY MASS INDEX: 34.38 KG/M2

## 2019-08-08 DIAGNOSIS — R06.02 SHORTNESS OF BREATH: ICD-10-CM

## 2019-08-08 PROCEDURE — 93306 TTE W/DOPPLER COMPLETE: CPT

## 2019-08-08 PROCEDURE — 25010000002 PERFLUTREN 6.52 MG/ML SUSPENSION: Performed by: INTERNAL MEDICINE

## 2019-08-08 PROCEDURE — 93306 TTE W/DOPPLER COMPLETE: CPT | Performed by: INTERNAL MEDICINE

## 2019-08-08 RX ADMIN — PERFLUTREN: 6.52 INJECTION, SUSPENSION INTRAVENOUS at 15:24

## 2019-08-10 LAB
BH CV ECHO MEAS - AO MAX PG (FULL): 4 MMHG
BH CV ECHO MEAS - AO MAX PG: 6 MMHG
BH CV ECHO MEAS - AO MEAN PG (FULL): 1.5 MMHG
BH CV ECHO MEAS - AO MEAN PG: 2.5 MMHG
BH CV ECHO MEAS - AO ROOT AREA (BSA CORRECTED): 1.7
BH CV ECHO MEAS - AO ROOT AREA: 8.6 CM^2
BH CV ECHO MEAS - AO ROOT DIAM: 3.3 CM
BH CV ECHO MEAS - AO V2 MAX: 122 CM/SEC
BH CV ECHO MEAS - AO V2 MEAN: 70 CM/SEC
BH CV ECHO MEAS - AO V2 VTI: 16.6 CM
BH CV ECHO MEAS - AVA(I,A): 3.3 CM^2
BH CV ECHO MEAS - AVA(I,D): 3.3 CM^2
BH CV ECHO MEAS - AVA(V,A): 2.7 CM^2
BH CV ECHO MEAS - AVA(V,D): 2.7 CM^2
BH CV ECHO MEAS - BSA(HAYCOCK): 2 M^2
BH CV ECHO MEAS - BSA: 1.9 M^2
BH CV ECHO MEAS - BZI_BMI: 34.4 KILOGRAMS/M^2
BH CV ECHO MEAS - BZI_METRIC_HEIGHT: 160 CM
BH CV ECHO MEAS - BZI_METRIC_WEIGHT: 88 KG
BH CV ECHO MEAS - EDV(CUBED): 67.4 ML
BH CV ECHO MEAS - EDV(MOD-SP4): 105 ML
BH CV ECHO MEAS - EDV(TEICH): 72.9 ML
BH CV ECHO MEAS - EF(CUBED): 74.8 %
BH CV ECHO MEAS - EF(MOD-SP4): 67.5 %
BH CV ECHO MEAS - EF(TEICH): 67.2 %
BH CV ECHO MEAS - ESV(CUBED): 17 ML
BH CV ECHO MEAS - ESV(MOD-SP4): 34.1 ML
BH CV ECHO MEAS - ESV(TEICH): 23.9 ML
BH CV ECHO MEAS - FS: 36.9 %
BH CV ECHO MEAS - IVS/LVPW: 1.4
BH CV ECHO MEAS - IVSD: 1.4 CM
BH CV ECHO MEAS - LA DIMENSION: 3.1 CM
BH CV ECHO MEAS - LA/AO: 0.94
BH CV ECHO MEAS - LAT PEAK E' VEL: 4.7 CM/SEC
BH CV ECHO MEAS - LV DIASTOLIC VOL/BSA (35-75): 55 ML/M^2
BH CV ECHO MEAS - LV MASS(C)D: 174.1 GRAMS
BH CV ECHO MEAS - LV MASS(C)DI: 91.2 GRAMS/M^2
BH CV ECHO MEAS - LV MAX PG: 1.9 MMHG
BH CV ECHO MEAS - LV MEAN PG: 1 MMHG
BH CV ECHO MEAS - LV SYSTOLIC VOL/BSA (12-30): 17.9 ML/M^2
BH CV ECHO MEAS - LV V1 MAX: 69.6 CM/SEC
BH CV ECHO MEAS - LV V1 MEAN: 43.8 CM/SEC
BH CV ECHO MEAS - LV V1 VTI: 11.5 CM
BH CV ECHO MEAS - LVIDD: 4.1 CM
BH CV ECHO MEAS - LVIDS: 2.6 CM
BH CV ECHO MEAS - LVLD AP4: 7 CM
BH CV ECHO MEAS - LVLS AP4: 6.2 CM
BH CV ECHO MEAS - LVOT AREA (M): 4.9 CM^2
BH CV ECHO MEAS - LVOT AREA: 4.7 CM^2
BH CV ECHO MEAS - LVOT DIAM: 2.5 CM
BH CV ECHO MEAS - LVPWD: 1.2 CM
BH CV ECHO MEAS - MED PEAK E' VEL: 4.3 CM/SEC
BH CV ECHO MEAS - MV A MAX VEL: 78.3 CM/SEC
BH CV ECHO MEAS - MV DEC TIME: 0.24 SEC
BH CV ECHO MEAS - MV E MAX VEL: 53.3 CM/SEC
BH CV ECHO MEAS - MV E/A: 0.68
BH CV ECHO MEAS - PI END-D VEL: 119 CM/SEC
BH CV ECHO MEAS - RAP SYSTOLE: 5 MMHG
BH CV ECHO MEAS - RVSP: 25.1 MMHG
BH CV ECHO MEAS - SI(AO): 74.4 ML/M^2
BH CV ECHO MEAS - SI(CUBED): 26.4 ML/M^2
BH CV ECHO MEAS - SI(LVOT): 28.4 ML/M^2
BH CV ECHO MEAS - SI(MOD-SP4): 37.1 ML/M^2
BH CV ECHO MEAS - SI(TEICH): 25.7 ML/M^2
BH CV ECHO MEAS - SV(AO): 142 ML
BH CV ECHO MEAS - SV(CUBED): 50.4 ML
BH CV ECHO MEAS - SV(LVOT): 54.2 ML
BH CV ECHO MEAS - SV(MOD-SP4): 70.9 ML
BH CV ECHO MEAS - SV(TEICH): 49 ML
BH CV ECHO MEAS - TR MAX VEL: 224 CM/SEC
BH CV ECHO MEASUREMENTS AVERAGE E/E' RATIO: 11.84
LEFT ATRIUM VOLUME INDEX: 17.9 ML/M2
LEFT ATRIUM VOLUME: 34.1 CM3
LV EF 2D ECHO EST: 60 %
MAXIMAL PREDICTED HEART RATE: 162 BPM
STRESS TARGET HR: 138 BPM

## 2019-08-27 ENCOUNTER — OFFICE VISIT (OUTPATIENT)
Dept: CARDIOLOGY | Facility: CLINIC | Age: 59
End: 2019-08-27

## 2019-08-27 VITALS
OXYGEN SATURATION: 95 % | BODY MASS INDEX: 36.07 KG/M2 | SYSTOLIC BLOOD PRESSURE: 138 MMHG | DIASTOLIC BLOOD PRESSURE: 98 MMHG | WEIGHT: 196 LBS | HEIGHT: 62 IN | HEART RATE: 70 BPM

## 2019-08-27 DIAGNOSIS — E78.2 MIXED HYPERLIPIDEMIA: ICD-10-CM

## 2019-08-27 DIAGNOSIS — R10.13 EPIGASTRIC PAIN: ICD-10-CM

## 2019-08-27 DIAGNOSIS — D49.2 NERVE SHEATH TUMOR: ICD-10-CM

## 2019-08-27 DIAGNOSIS — K21.9 GASTROESOPHAGEAL REFLUX DISEASE, ESOPHAGITIS PRESENCE NOT SPECIFIED: Primary | ICD-10-CM

## 2019-08-27 DIAGNOSIS — I10 ESSENTIAL HYPERTENSION: ICD-10-CM

## 2019-08-27 DIAGNOSIS — I51.7 LVH (LEFT VENTRICULAR HYPERTROPHY): ICD-10-CM

## 2019-08-27 DIAGNOSIS — Z85.41 HISTORY OF CERVICAL CANCER: ICD-10-CM

## 2019-08-27 DIAGNOSIS — R06.00 DYSPNEA, UNSPECIFIED TYPE: ICD-10-CM

## 2019-08-27 DIAGNOSIS — R06.02 SHORTNESS OF BREATH: ICD-10-CM

## 2019-08-27 DIAGNOSIS — I25.10 NONOBSTRUCTIVE ATHEROSCLEROSIS OF CORONARY ARTERY: ICD-10-CM

## 2019-08-27 PROCEDURE — 93000 ELECTROCARDIOGRAM COMPLETE: CPT | Performed by: INTERNAL MEDICINE

## 2019-08-27 PROCEDURE — 99214 OFFICE O/P EST MOD 30 MIN: CPT | Performed by: INTERNAL MEDICINE

## 2019-08-27 RX ORDER — BUMETANIDE 0.5 MG/1
0.5 TABLET ORAL DAILY
Qty: 90 TABLET | Refills: 3 | Status: SHIPPED | OUTPATIENT
Start: 2019-08-27 | End: 2020-12-09

## 2019-08-27 RX ORDER — CLONIDINE 0.1 MG/24H
1 PATCH, EXTENDED RELEASE TRANSDERMAL WEEKLY
Qty: 12 PATCH | Refills: 3 | Status: SHIPPED | OUTPATIENT
Start: 2019-08-27 | End: 2020-05-01

## 2019-08-27 RX ORDER — CLONIDINE HYDROCHLORIDE 0.1 MG/1
0.1 TABLET ORAL 2 TIMES DAILY PRN
Qty: 60 TABLET | Refills: 3 | Status: SHIPPED | OUTPATIENT
Start: 2019-08-27 | End: 2020-05-01 | Stop reason: SDUPTHER

## 2019-08-27 NOTE — PROGRESS NOTES
Akua Aguilar  [id number]  1960  58 y.o.  female    Referring Provider: Remy Alcaraz MD    Reason for Follow-up Visit:  Routine follow up.  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 yo female with history of  Essential Hypertension   who presents today for   Chief Complaint   Patient presents with   • Shortness of Breath     6 mo f/u    • Hypertension   .    History  Past Medical History:   Diagnosis Date   • Arthritis    • Cancer (CMS/HCC)     CREVICAL, UTERINE   • COPD (chronic obstructive pulmonary disease) (CMS/HCC)    • Fatty liver    • GERD (gastroesophageal reflux disease)    • History of transfusion    • Hypertension    • Kidney stone    • Low back pain potentially associated with spinal stenosis    • Lupus    • Migraines    • Scoliosis    • Shortness of breath    • UTI (urinary tract infection)    ,   Past Surgical History:   Procedure Laterality Date   • BACK SURGERY      lumbar   • CARDIAC CATHETERIZATION     • CARDIAC DEFIBRILLATOR PLACEMENT     •  SECTION     • CHOLECYSTECTOMY     • COLON RESECTION       or    • COLONOSCOPY     • COLONOSCOPY N/A 5/10/2018    Procedure: COLONOSCOPY WITH ANESTHESIA;  Surgeon: Shawna Valladares MD;  Location: [facility] ENDOSCOPY;  Service: Gastroenterology   • ENDOSCOPY N/A 5/10/2018     Procedure: ESOPHAGOGASTRODUODENOSCOPY WITH ANESTHESIA;  Surgeon: Shawna Valladares MD;  Location: [facility] ENDOSCOPY;  Service: Gastroenterology   • EYE SURGERY      x 2   • HYSTERECTOMY     • LEG SURGERY Right     bars/bolts placed   • UPPER GASTROINTESTINAL ENDOSCOPY  1993   • URETEROSCOPY LASER LITHOTRIPSY WITH STENT INSERTION Right 11/14/2018    Procedure: URETEROSCOPY  WITH STENT INSERTION RETROGRADE PYELOGRAM;  Surgeon: Tyrell Hardin MD;  Location: [facility] OR;  Service: Urology   ,   Family History   Problem Relation Age of Onset   • Cancer Mother    • Heart disease Mother    • Heart disease Father    • Lupus Sister    • Heart disease Sister    • Heart disease Sister    • No Known Problems Sister    • Hypokalemia Sister    • Alcohol abuse Sister    • Colon cancer Neg Hx    • Colon polyps Neg Hx    ,   Social History     Tobacco Use   • Smoking status: Current Every Day Smoker     Packs/day: 0.25     Years: 40.00     Pack years: 10.00     Types: Cigarettes   • Smokeless tobacco: Never Used   Substance Use Topics   • Alcohol use: No   • Drug use: No   ,     Medications  Current Outpatient Medications   Medication Sig Dispense Refill   • amLODIPine (NORVASC) 5 MG tablet TAKE 1 TABLET BY MOUTH EVERY DAY 30 tablet 10   • atenolol (TENORMIN) 50 MG tablet TAKE 1 TABLET BY MOUTH EVERY DAY 30 tablet 8   • bumetanide (BUMEX) 0.5 MG tablet Take 1 tablet by mouth Daily. 90 tablet 3   • ciprofloxacin (CIPRO) 500 MG tablet TAKE 1 TABLET BY MOUTH TWICE A DAY FOR 7 DAYS  0   • doxepin (SINEquan) 25 MG capsule 1 CAPSULE BY MOUTH AT BEDTIME FILL ON OR AFTER 1/4/2018  1   • gabapentin (NEURONTIN) 300 MG capsule Take 300 mg by mouth 2 (Two) Times a Day.     • gabapentin (NEURONTIN) 600 MG tablet Take 900 mg by mouth Every Night.     • HYDROcodone-acetaminophen (NORCO)  MG per tablet Every 6 (Six) Hours.     • ketorolac (TORADOL) 10 MG tablet Take 1 tablet by mouth Every 6 (Six) Hours As Needed for Moderate Pain . 20 tablet  "0   • lisinopril (PRINIVIL,ZESTRIL) 40 MG tablet TAKE 1 TABLET BY MOUTH TWICE A DAY 60 tablet 7   • nitroglycerin (NITROSTAT) 0.4 MG SL tablet Take no more than 3 doses in 15 minutes. 30 tablet 8   • ondansetron ODT (ZOFRAN-ODT) 4 MG disintegrating tablet DISSOLVE 1 TABLET BY MOUTH EVERY 4 TO 6 HOURS AS NEEDED FOR NAUSEA AND VOMITING  0   • oxybutynin (DITROPAN) 5 MG tablet TAKE 1 TABLET BY MOUTH THREE TIMES DAILY AS NEEDED FOR BLADDER SPASMS 30 tablet 0   • pantoprazole (PROTONIX) 40 MG EC tablet TAKE 1 TABLET BY MOUTH DAILY. 30 tablet 11   • tamsulosin (FLOMAX) 0.4 MG capsule 24 hr capsule Take 1 capsule by mouth Daily.  0   • vitamin D (ERGOCALCIFEROL) 58999 units capsule capsule Every 7 (Seven) Days.     • Blood Pressure Monitoring (SPHYGMOMANOMETER) misc 1 Units 2 (Two) Times a Day. 1 each 0   • CloNIDine (CATAPRES) 0.1 MG tablet Take 1 tablet by mouth 2 (Two) Times a Day As Needed for High Blood Pressure (>160/90). 60 tablet 3   • cloNIDine (CATAPRES-TTS-1) 0.1 MG/24HR patch Place 1 patch on the skin as directed by provider 1 (One) Time Per Week. 12 patch 3     No current facility-administered medications for this visit.        Allergies:  Aspirin and Latex    Review of Systems  Review of Systems   Constitution: Positive for weakness and malaise/fatigue.   HENT: Negative.    Eyes: Negative.    Cardiovascular: Positive for chest pain, dyspnea on exertion and leg swelling. Negative for claudication, cyanosis, irregular heartbeat, near-syncope, orthopnea, palpitations, paroxysmal nocturnal dyspnea and syncope.   Respiratory: Negative.    Endocrine: Negative.    Hematologic/Lymphatic: Negative.    Skin: Negative.    Musculoskeletal: Positive for arthritis and back pain.   Gastrointestinal: Negative for anorexia.   Genitourinary: Negative.    Psychiatric/Behavioral: Negative.        Objective     Physical Exam:  /98 (BP Location: Left arm)   Pulse 70   Ht 157.5 cm (62\")   Wt 88.9 kg (196 lb)   SpO2 95%   " BMI 35.85 kg/m²   Physical Exam   Constitutional: She appears well-developed.   HENT:   Head: Normocephalic.   Neck: Normal carotid pulses and no JVD present. No tracheal tenderness present. Carotid bruit is not present. No tracheal deviation and no edema present.   Cardiovascular: Regular rhythm, normal heart sounds and normal pulses.   Pulmonary/Chest: Effort normal. No stridor.   Abdominal: Soft.   Neurological: She is alert. She has normal strength. No cranial nerve deficit or sensory deficit.   Skin: Skin is warm.   Psychiatric: She has a normal mood and affect. Her speech is normal and behavior is normal.       Results Review:  Results for orders placed during the hospital encounter of 08/08/19   Adult Transthoracic Echo Complete W/ Cont if Necessary Per Protocol    Narrative · Estimated EF = 60%.  · Left ventricular diastolic dysfunction.  · Left ventricular wall thickness is consistent with mild concentric   hypertrophy.  · No evidence of pulmonary hypertension is present.        Cath 2016 : CONCLUSION:  1.   Normal ejection fraction of 55% with mild mitral regurgitation.   2.   Mildly elevated left ventricular diastolic pressure of 14 mmHg.   3.   Normal left dominant epicardial coronary arteries with 30% stenosis of the  mid left anterior descending coronary artery.   4.   Mild ectasia of the infrarenal aorta, for which serial monitoring is  required.   5.   No significant stenosis of bilateral renal arteries.   6.   Hydration, observation.   7.   Increase antianginals. Treat for microvascular angina as required down the  road.   8.   Control blood pressure.   9.   Increase clonidine from 0.1 mg daily to 0.1 mg b.i.d. and increase  atenolol from 25 to 50 mg daily.   10.  Intensive risk factor modification.   11.  Monitor the patient. If stable, can be discharged today and follow up with  me in 3 months.  12.  See Dr. Omega Wynne in 2-4 weeks.     ECG 12 Lead  Date/Time: 8/27/2019 12:44 PM  Performed by:  Robert Padilla MD  Authorized by: Robert Padilla MD   Comparison: compared with previous ECG from 7/16/2019  Similar to previous ECG  Rhythm: sinus rhythm  Rate: normal  Conduction: conduction normal  Q waves: V1 and V2    ST Segments: ST segments normal  QRS axis: normal  Other: no other findings    Clinical impression: abnormal EKG            Assessment/Plan   Akua was seen today for shortness of breath and hypertension.    Diagnoses and all orders for this visit:    Gastroesophageal reflux disease, esophagitis presence not specified    Essential hypertension  -     ECG 12 Lead    Mixed hyperlipidemia    Nonobstructive atherosclerosis of coronary artery    Shortness of breath    Epigastric pain    Dyspnea, unspecified type  -     Full Pulmonary Function Test With Bronchodilator; Future    Nerve sheath tumor  -     Ambulatory Referral to Oncology    History of cervical cancer  -     Ambulatory Referral to Oncology    LVH (left ventricular hypertrophy) mild    Other orders  -     cloNIDine (CATAPRES-TTS-1) 0.1 MG/24HR patch; Place 1 patch on the skin as directed by provider 1 (One) Time Per Week.  -     CloNIDine (CATAPRES) 0.1 MG tablet; Take 1 tablet by mouth 2 (Two) Times a Day As Needed for High Blood Pressure (>160/90).  -     Blood Pressure Monitoring (SPHYGMOMANOMETER) misc; 1 Units 2 (Two) Times a Day.  -     bumetanide (BUMEX) 0.5 MG tablet; Take 1 tablet by mouth Daily.             Plan         No exertional hypoxemia       Stop HCTZ     Orders Placed This Encounter   Procedures   • Ambulatory Referral to Oncology     Referral Priority:   Routine     Requested Specialty:   Oncology     Number of Visits Requested:   1   • ECG 12 Lead     This order was created via procedure documentation   • Full Pulmonary Function Test With Bronchodilator     Standing Status:   Future     Standing Expiration Date:   8/27/2020     Order Specific Question:   PFT Procedures to Be Performed     Answer:   Lung volumes     Order  Specific Question:   PFT Procedures to Be Performed     Answer:   DLCO     Order Specific Question:   PFT Procedures to Be Performed     Answer:   Spirometry pre and post bronchodilator    Keep f/u with Dr Champagne    Requested Prescriptions     Signed Prescriptions Disp Refills   • cloNIDine (CATAPRES-TTS-1) 0.1 MG/24HR patch 12 patch 3     Sig: Place 1 patch on the skin as directed by provider 1 (One) Time Per Week.   • CloNIDine (CATAPRES) 0.1 MG tablet 60 tablet 3     Sig: Take 1 tablet by mouth 2 (Two) Times a Day As Needed for High Blood Pressure (>160/90).   • Blood Pressure Monitoring (SPHYGMOMANOMETER) misc 1 each 0     Si Units 2 (Two) Times a Day.   • bumetanide (BUMEX) 0.5 MG tablet 90 tablet 3     Sig: Take 1 tablet by mouth Daily.        Check BP and heart rates twice daily at least 3x / week at home and bring a recording for me to review next visit  IF BP >135/85 call sooner     ____________________________________________________________________________________________________________________________________________  Health maintenance and recommendations      Similar recommendations as last visit       Offered to give patient  a copy      Questions were encouraged, asked and answered to the patient's  understanding and satisfaction. Questions if any regarding current medications and side effects, need for refills and importance of compliance to medications stressed.    Reviewed available prior notes, consults, prior visits, laboratory findings, radiology and cardiology relevant reports. Updated chart as applicable. I have reviewed the patient's medical history in detail and updated the computerized patient record as relevant.      Updated patient regarding any new or relevant abnormalities on review of records or any new findings on physical exam. Mentioned to patient about purpose of visit and desirable health short and long term goals and objectives.    Primary to monitor CBC CMP Lipid panel and  TSH as applicable    ___________________________________________________________________________________________________________________________________________                 Return in about 6 months (around 2/27/2020).

## 2019-09-05 DIAGNOSIS — E55.9 VITAMIN D DEFICIENCY: Primary | ICD-10-CM

## 2019-09-05 RX ORDER — ERGOCALCIFEROL 1.25 MG/1
CAPSULE ORAL
Qty: 22 CAPSULE | Refills: 0 | OUTPATIENT
Start: 2019-09-05

## 2019-09-09 RX ORDER — HYDROCHLOROTHIAZIDE 25 MG/1
TABLET ORAL
Qty: 30 TABLET | Refills: 9 | Status: SHIPPED | OUTPATIENT
Start: 2019-09-09 | End: 2020-05-01

## 2019-09-09 RX ORDER — ATENOLOL 50 MG/1
TABLET ORAL
Qty: 30 TABLET | Refills: 8 | Status: SHIPPED | OUTPATIENT
Start: 2019-09-09 | End: 2020-05-31

## 2019-09-09 RX ORDER — CLONIDINE HYDROCHLORIDE 0.1 MG/1
TABLET ORAL
Qty: 60 TABLET | Refills: 9 | Status: SHIPPED | OUTPATIENT
Start: 2019-09-09 | End: 2020-05-01

## 2019-09-20 DIAGNOSIS — Z85.41 HX OF CERVICAL CANCER: Primary | ICD-10-CM

## 2019-09-24 ENCOUNTER — OFFICE VISIT (OUTPATIENT)
Dept: NEUROLOGY | Age: 59
End: 2019-09-24
Payer: MEDICAID

## 2019-09-24 VITALS
HEIGHT: 63 IN | HEART RATE: 75 BPM | DIASTOLIC BLOOD PRESSURE: 88 MMHG | SYSTOLIC BLOOD PRESSURE: 157 MMHG | BODY MASS INDEX: 34.38 KG/M2 | WEIGHT: 194 LBS | OXYGEN SATURATION: 95 %

## 2019-09-24 DIAGNOSIS — G25.0 ESSENTIAL TREMOR: ICD-10-CM

## 2019-09-24 DIAGNOSIS — E55.9 VITAMIN D DEFICIENCY: ICD-10-CM

## 2019-09-24 DIAGNOSIS — G47.33 OBSTRUCTIVE SLEEP APNEA: Primary | ICD-10-CM

## 2019-09-24 DIAGNOSIS — R40.0 DAYTIME SOMNOLENCE: ICD-10-CM

## 2019-09-24 DIAGNOSIS — R06.83 SNORING: ICD-10-CM

## 2019-09-24 DIAGNOSIS — G25.3 MYOCLONUS: ICD-10-CM

## 2019-09-24 DIAGNOSIS — R06.81 WITNESSED APNEIC SPELLS: ICD-10-CM

## 2019-09-24 PROCEDURE — 4004F PT TOBACCO SCREEN RCVD TLK: CPT | Performed by: PHYSICIAN ASSISTANT

## 2019-09-24 PROCEDURE — G8417 CALC BMI ABV UP PARAM F/U: HCPCS | Performed by: PHYSICIAN ASSISTANT

## 2019-09-24 PROCEDURE — G8427 DOCREV CUR MEDS BY ELIG CLIN: HCPCS | Performed by: PHYSICIAN ASSISTANT

## 2019-09-24 PROCEDURE — 99214 OFFICE O/P EST MOD 30 MIN: CPT | Performed by: PHYSICIAN ASSISTANT

## 2019-09-24 PROCEDURE — 3017F COLORECTAL CA SCREEN DOC REV: CPT | Performed by: PHYSICIAN ASSISTANT

## 2019-09-24 RX ORDER — BUMETANIDE 0.5 MG/1
TABLET ORAL
Refills: 3 | COMMUNITY
Start: 2019-08-27 | End: 2021-09-03

## 2019-09-24 NOTE — PROGRESS NOTES

## 2019-09-24 NOTE — PATIENT INSTRUCTIONS
medications require special management and close monitoring to reduce the risk of a blocked airway. Dental devices -- A dental device, called an oral appliance or mandibular advancement device, can reposition the jaw (mandible), bringing the tongue and soft palate forward as well. This may relieve obstruction in some people. This treatment is excellent for reducing snoring, although the effect on LENI is sometimes more limited. As a result, dental devices are best used for mild cases of LENI when relief of snoring is the main goal. Failure to tolerate and accept CPAP is another indication for dental devices. While dental devices are not as effective as CPAP for LENI, some patients prefer a dental device to CPAP. Side effects of dental devices are generally minor but may include changes to the bite with prolonged use. Surgical treatment -- Surgery is an alternative therapy for patients who cannot tolerate or do not improve with nonsurgical treatments such as CPAP or oral devices. Surgery can also be used in combination with other nonsurgical treatments. Surgical procedures reshape structures in the upper airways or surgically reposition bone or soft tissue. Uvulopalatopharyngoplasty (UPPP) removes the uvula and excessive tissue in the throat, including the tonsils, if present. Other procedures, such as maxillomandibular advancement (MMA), address both the upper and lower pharyngeal airway more globally. UPPP alone has limited success rates (less than 50 percent) and people can relapse (when LENI symptoms return after surgery). As a result, this surgery is only recommended in a minority of people and should be considered with caution. MMA may have a higher success rate, particularly in people with abnormal jaw (maxilla and mandible) anatomy, but it is the most complicated procedure. A newer surgical approach, nerve stimulation to protrude the tongue, has promising success rates in very selected people.   Tracheostomy creates a permanent opening in the neck. It is reserved for people with severe disease in whom less drastic measures have failed or are inappropriate. Although it is always successful in eliminating obstructive sleep apnea, tracheostomy requires significant lifestyle changes and carries some serious risks (eg, infection, bleeding, blockage). All surgical treatments require discussions about the goals of treatment, the expected outcomes, and potential complications. Hypoglossal nerve stimulator- \"Inspire\" device    WHERE TO GET MORE INFORMATION -- Your healthcare provider is the best source of information for questions and concerns related to your medical problem. Organizations  American Sleep Apnea Association  Provides information about sleep apnea to the public, publishes a newsletter, and serves as an advocate for people with the disorder. Vicki, [address] [facility], [address] [place] [address]   [email or web address]. [email or web address]   [email or web address] org   Tel: [phone]   Fax: [facility] organization that works to [employer] and safety by promoting public understanding of sleep and sleep disorders. Supports sleep-related education, research, and advocacy; produces and distributes educational materials to the public and healthcare professionals; and offers postdoctoral fellowships and grants for sleep researchers. Hailee [address]   [email or web address]   [email or web address]. org   Tel: [phone]   Fax: [phone]    Important information:  Medicare/private insurance CPAP/BiPAP/APAP requirements:  Medicare/private insurance has specific requirements for PAP compliance that must be met during the first 90 days of use to continue coverage for CPAP/BiPAP/APAP  from day 91 and beyond.  The policy requires that patients use a PAP device 4 hours per 24 hour period, at least 70% of the time over a 30 day

## 2019-10-03 ENCOUNTER — TELEPHONE (OUTPATIENT)
Dept: CARDIOLOGY | Facility: CLINIC | Age: 59
End: 2019-10-03

## 2020-02-03 RX ORDER — PANTOPRAZOLE SODIUM 40 MG/1
40 TABLET, DELAYED RELEASE ORAL DAILY
Qty: 30 TABLET | Refills: 11 | OUTPATIENT
Start: 2020-02-03

## 2020-03-25 ENCOUNTER — TELEPHONE (OUTPATIENT)
Dept: VASCULAR SURGERY | Age: 60
End: 2020-03-25

## 2020-05-01 ENCOUNTER — OFFICE VISIT (OUTPATIENT)
Dept: CARDIOLOGY | Facility: CLINIC | Age: 60
End: 2020-05-01

## 2020-05-01 DIAGNOSIS — Z72.0 TOBACCO ABUSE: ICD-10-CM

## 2020-05-01 DIAGNOSIS — K21.9 GASTROESOPHAGEAL REFLUX DISEASE WITHOUT ESOPHAGITIS: ICD-10-CM

## 2020-05-01 DIAGNOSIS — I25.10 NONOBSTRUCTIVE ATHEROSCLEROSIS OF CORONARY ARTERY: ICD-10-CM

## 2020-05-01 DIAGNOSIS — R07.89 CHEST HEAVINESS: ICD-10-CM

## 2020-05-01 DIAGNOSIS — R06.00 DYSPNEA, UNSPECIFIED TYPE: ICD-10-CM

## 2020-05-01 DIAGNOSIS — R06.02 SHORTNESS OF BREATH: Primary | ICD-10-CM

## 2020-05-01 DIAGNOSIS — I51.7 LVH (LEFT VENTRICULAR HYPERTROPHY): ICD-10-CM

## 2020-05-01 DIAGNOSIS — E78.2 MIXED HYPERLIPIDEMIA: ICD-10-CM

## 2020-05-01 DIAGNOSIS — I10 ESSENTIAL HYPERTENSION: ICD-10-CM

## 2020-05-01 DIAGNOSIS — K21.9 GASTROESOPHAGEAL REFLUX DISEASE, ESOPHAGITIS PRESENCE NOT SPECIFIED: ICD-10-CM

## 2020-05-01 DIAGNOSIS — I77.89 ENLARGEMENT OF ABDOMINAL AORTA (HCC): ICD-10-CM

## 2020-05-01 PROCEDURE — 99214 OFFICE O/P EST MOD 30 MIN: CPT | Performed by: INTERNAL MEDICINE

## 2020-05-01 RX ORDER — HYDROXYZINE PAMOATE 25 MG/1
25 CAPSULE ORAL EVERY 8 HOURS PRN
COMMUNITY
Start: 2020-04-13 | End: 2022-08-19

## 2020-05-01 RX ORDER — AMLODIPINE BESYLATE 10 MG/1
10 TABLET ORAL DAILY
Qty: 90 TABLET | Refills: 3 | Status: SHIPPED | OUTPATIENT
Start: 2020-05-01 | End: 2020-05-29 | Stop reason: SDUPTHER

## 2020-05-01 RX ORDER — CLONIDINE 0.2 MG/24H
1 PATCH, EXTENDED RELEASE TRANSDERMAL WEEKLY
Qty: 12 PATCH | Refills: 3 | Status: SHIPPED | OUTPATIENT
Start: 2020-05-01 | End: 2020-08-15

## 2020-05-01 RX ORDER — CITALOPRAM 40 MG/1
40 TABLET ORAL DAILY
COMMUNITY
Start: 2020-04-03 | End: 2021-02-24

## 2020-05-01 RX ORDER — LEVETIRACETAM 500 MG/1
500 TABLET ORAL 2 TIMES DAILY
COMMUNITY
Start: 2020-04-09 | End: 2022-08-19

## 2020-05-01 NOTE — PROGRESS NOTES
Akua Aguialr  [id number]  1960  59 y.o.  female      You have chosen to receive care through a telehealth visit.  Do you consent to use a video/audio connection for your medical care today? Yes     Referring Provider: Remy Alcaraz MD    Reason for Follow-up Visit:  Routine follow up.  Similar symptoms as during last visit   Essential Hypertension  Non obstructive coronary artery disease   States blood pressure is elevated and at times above 200/110 mmHg.    Subjective    Patient now complains of recurrent chest heaviness  This lasts for less than 5 mins  Associated diaphoresis   No nausea  No radiation   Occurs 3-4 times  a week   Started several years ago now more frequent 3 months ago  Chest pain non pleuritic  Chest pain non positional and non gustatory     Moderate chronic exertional shortness of breath on exertion relieved with rest  No significant cough or wheezing    No palpitations  No significant pedal edema    No fever or chills  No significant expectoration    No hemoptysis  No presyncope or syncope     Still smoking but much lesser and now currently smoking 6 to 8 cigarettes a day    History of present illness:  Akua Aguilar is a 59 y.o. yo female with history of  Essential Hypertension   who presents today for   Chief Complaint   Patient presents with   • Hypertension     Patient says her blood pressure is still high and she is concerned about taking so many blood pressure medications.   • Fatigue     Per patient she feels tired all the time.   .    History  Past Medical History:   Diagnosis Date   • Arthritis    • Cancer (CMS/HCC)     CREVICAL, UTERINE   • COPD (chronic obstructive pulmonary disease) (CMS/HCC)    • Fatty liver    • GERD (gastroesophageal reflux disease)    • History of transfusion    • Hypertension    • Kidney stone    • Low back pain potentially associated with spinal stenosis    • Lupus (CMS/HCC)    • Migraines    • Scoliosis    • Shortness of breath     • UTI (urinary tract infection)    ,   Past Surgical History:   Procedure Laterality Date   • BACK SURGERY      lumbar   • CARDIAC CATHETERIZATION     • CARDIAC DEFIBRILLATOR PLACEMENT     •  SECTION     • CHOLECYSTECTOMY     • COLON RESECTION       or    • COLONOSCOPY     • COLONOSCOPY N/A 5/10/2018    Procedure: COLONOSCOPY WITH ANESTHESIA;  Surgeon: Shawna Valladares MD;  Location: [facility] ENDOSCOPY;  Service: Gastroenterology   • ENDOSCOPY N/A 5/10/2018    Procedure: ESOPHAGOGASTRODUODENOSCOPY WITH ANESTHESIA;  Surgeon: Shawna Vlaladares MD;  Location: [facility] ENDOSCOPY;  Service: Gastroenterology   • EYE SURGERY      x 2   • HYSTERECTOMY     • LEG SURGERY Right     bars/bolts placed   • UPPER GASTROINTESTINAL ENDOSCOPY     • URETEROSCOPY LASER LITHOTRIPSY WITH STENT INSERTION Right 2018    Procedure: URETEROSCOPY  WITH STENT INSERTION RETROGRADE PYELOGRAM;  Surgeon: Tyrell Hardin MD;  Location: [facility] OR;  Service: Urology   ,   Family History   Problem Relation Age of Onset   • Cancer Mother    • Heart disease Mother    • Heart disease Father    • Lupus Sister    • Heart disease Sister    • Heart disease Sister    • No Known Problems Sister    • Hypokalemia Sister    • Alcohol abuse Sister    • Colon cancer Neg Hx    • Colon polyps Neg Hx    ,   Social History     Tobacco Use   • Smoking status: Current Every Day Smoker     Packs/day: 0.50     Years: 40.00     Pack years: 20.00     Types: Cigarettes   • Smokeless tobacco: Never Used   Substance Use Topics   • Alcohol use: No   • Drug use: No   ,     Medications  Current Outpatient Medications   Medication Sig Dispense Refill   • amLODIPine (NORVASC) 10 MG tablet Take 1 tablet by mouth Daily. 90 tablet 3   • atenolol (TENORMIN) 50 MG tablet TAKE 1 TABLET BY MOUTH EVERY DAY 30 tablet 8   • bumetanide (BUMEX) 0.5 MG tablet Take 1 tablet by mouth Daily. 90 tablet 3   • citalopram (CeleXA) 20 MG tablet Take 20 mg by mouth Daily.     •  doxepin (SINEquan) 25 MG capsule 1 CAPSULE BY MOUTH AT BEDTIME FILL ON OR AFTER 1/4/2018  1   • gabapentin (NEURONTIN) 300 MG capsule Take 300 mg by mouth 2 (Two) Times a Day.     • gabapentin (NEURONTIN) 600 MG tablet Take 900 mg by mouth Every Night.     • HYDROcodone-acetaminophen (NORCO)  MG per tablet Take 1 tablet by mouth Every 6 (Six) Hours.     • hydrOXYzine pamoate (VISTARIL) 25 MG capsule Take 25 mg by mouth Every 8 (Eight) Hours As Needed.     • levETIRAcetam (KEPPRA) 500 MG tablet Take 500 mg by mouth 2 (Two) Times a Day.     • lisinopril (PRINIVIL,ZESTRIL) 40 MG tablet TAKE 1 TABLET BY MOUTH TWICE A DAY 60 tablet 7   • nitroglycerin (NITROSTAT) 0.4 MG SL tablet Take no more than 3 doses in 15 minutes. 30 tablet 8   • pantoprazole (PROTONIX) 40 MG EC tablet TAKE 1 TABLET BY MOUTH DAILY. 30 tablet 11   • vitamin D (ERGOCALCIFEROL) 59452 units capsule capsule Take 50,000 Units by mouth Every 7 (Seven) Days.     • Blood Pressure Monitoring (SPHYGMOMANOMETER) misc 1 Units 2 (Two) Times a Day. 1 each 0   • cloNIDine (CATAPRES-TTS) 0.2 MG/24HR patch Place 1 patch on the skin as directed by provider 1 (One) Time Per Week. 12 patch 3     No current facility-administered medications for this visit.        Allergies:  Aspirin and Latex    Review of Systems  Review of Systems   Constitution: Positive for malaise/fatigue.   HENT: Negative.    Eyes: Negative.    Cardiovascular: Positive for chest pain, dyspnea on exertion and leg swelling. Negative for claudication, cyanosis, irregular heartbeat, near-syncope, orthopnea, palpitations, paroxysmal nocturnal dyspnea and syncope.   Respiratory: Negative.    Endocrine: Negative.    Hematologic/Lymphatic: Negative.    Skin: Negative.    Musculoskeletal: Positive for arthritis and back pain.   Gastrointestinal: Negative for anorexia.   Genitourinary: Negative.    Neurological: Positive for weakness.   Psychiatric/Behavioral: Negative.        Objective     Physical  Exam:      There were no vitals taken for this visit.  • General appearance is normal  • Normal judgment and insight  • Normal assessment of mental status, which may include:  - Orientation to time, place, and person  - Recent and remote memory  - Mood and affect  • Examination of pupils and irises (PERRLA)  • Normal external appearance of the ears and nose  • Normal assessment of hearing  • Normal external appearance of lips  • Normal external appearance of anterior surface of neck  • Normal respiratory effort  • No reported lower extremity edema  • No obvious skin abnormalities of visualized skin surfaces    • Normal examination of digits and nails (clubbing, cyanosis ,ischemia)  • No obvious abnormalities of visualized joint or musculature  • Normalized range of motion of visualized muscle groups        Results Review:  Results for orders placed during the hospital encounter of 08/08/19   Adult Transthoracic Echo Complete W/ Cont if Necessary Per Protocol    Narrative · Estimated EF = 60%.  · Left ventricular diastolic dysfunction.  · Left ventricular wall thickness is consistent with mild concentric   hypertrophy.  · No evidence of pulmonary hypertension is present.        Cath 2016 : CONCLUSION:  1.   Normal ejection fraction of 55% with mild mitral regurgitation.   2.   Mildly elevated left ventricular diastolic pressure of 14 mmHg.   3.   Normal left dominant epicardial coronary arteries with 30% stenosis of the  mid left anterior descending coronary artery.   4.   Mild ectasia of the infrarenal aorta, for which serial monitoring is  required.   5.   No significant stenosis of bilateral renal arteries.   6.   Hydration, observation.   7.   Increase antianginals. Treat for microvascular angina as required down the  road.   8.   Control blood pressure.   9.   Increase clonidine from 0.1 mg daily to 0.1 mg b.i.d. and increase  atenolol from 25 to 50 mg daily.   10.  Intensive risk factor modification.   11.   Monitor the patient. If stable, can be discharged today and follow up with  me in 3 months.  12.  See Dr. Omega Wynne in 2-4 weeks.   Procedures    Assessment/Plan   Akua was seen today for hypertension and fatigue.    Diagnoses and all orders for this visit:    Shortness of breath    Tobacco abuse    Mixed hyperlipidemia    Essential hypertension    Nonobstructive atherosclerosis of coronary artery    Gastroesophageal reflux disease without esophagitis    Gastroesophageal reflux disease, esophagitis presence not specified    Enlargement of abdominal aorta (CMS/HCC) small infra renal    LVH (left ventricular hypertrophy) mild    Dyspnea, unspecified type    Chest heaviness  -     CT Angiogram Coronary; Future    Other orders  -     amLODIPine (NORVASC) 10 MG tablet; Take 1 tablet by mouth Daily.  -     cloNIDine (CATAPRES-TTS) 0.2 MG/24HR patch; Place 1 patch on the skin as directed by provider 1 (One) Time Per Week.  -     Blood Pressure Monitoring (SPHYGMOMANOMETER) misc; 1 Units 2 (Two) Times a Day.             Plan      Requested Prescriptions     Signed Prescriptions Disp Refills   • amLODIPine (NORVASC) 10 MG tablet 90 tablet 3     Sig: Take 1 tablet by mouth Daily.   • cloNIDine (CATAPRES-TTS) 0.2 MG/24HR patch 12 patch 3     Sig: Place 1 patch on the skin as directed by provider 1 (One) Time Per Week.   • Blood Pressure Monitoring (SPHYGMOMANOMETER) misc 1 each 0     Si Units 2 (Two) Times a Day.      Orders Placed This Encounter   Procedures   • CT Angiogram Coronary     Standing Status:   Future     Standing Expiration Date:   2021      Keep f/u with Dr Champagne       Check BP and heart rates twice daily at least 3x / week at home and bring a recording for me to review next visit  IF BP >160/90 call sooner     ____________________________________________________________________________________________________________________________________________  Health maintenance and recommendations      Similar  recommendations as last visit       Questions were encouraged, asked and answered to the patient's  understanding and satisfaction. Questions if any regarding current medications and side effects, need for refills and importance of compliance to medications stressed.    Reviewed available prior notes, consults, prior visits, laboratory findings, radiology and cardiology relevant reports. Updated chart as applicable. I have reviewed the patient's medical history in detail and updated the computerized patient record as relevant.      Updated patient regarding any new or relevant abnormalities on review of records or any new findings on physical exam. Mentioned to patient about purpose of visit and desirable health short and long term goals and objectives.    Primary to monitor CBC CMP Lipid panel and TSH as applicable    ___________________________________________________________________________________________________________________________________________            Return in about 4 weeks (around 5/29/2020).

## 2020-05-18 ENCOUNTER — HOSPITAL ENCOUNTER (OUTPATIENT)
Dept: CT IMAGING | Facility: HOSPITAL | Age: 60
Discharge: HOME | End: 2020-05-18
Admitting: INTERNAL MEDICINE

## 2020-05-18 VITALS
BODY MASS INDEX: 33.49 KG/M2 | DIASTOLIC BLOOD PRESSURE: 98 MMHG | SYSTOLIC BLOOD PRESSURE: 161 MMHG | HEART RATE: 63 BPM | OXYGEN SATURATION: 96 % | WEIGHT: 189 LBS | HEIGHT: 63 IN | TEMPERATURE: 98.3 F | RESPIRATION RATE: 15 BRPM

## 2020-05-18 DIAGNOSIS — R07.89 CHEST HEAVINESS: ICD-10-CM

## 2020-05-18 LAB
CREAT BLD-MCNC: 0.45 MG/DL (ref 0.57–1)
GFR SERPL CREATININE-BSD FRML MDRD: 143 ML/MIN/1.73

## 2020-05-18 PROCEDURE — 75574 CT ANGIO HRT W/3D IMAGE: CPT | Performed by: INTERNAL MEDICINE

## 2020-05-18 PROCEDURE — 82565 ASSAY OF CREATININE: CPT | Performed by: INTERNAL MEDICINE

## 2020-05-18 PROCEDURE — 75574 CT ANGIO HRT W/3D IMAGE: CPT

## 2020-05-18 RX ORDER — METOPROLOL TARTRATE 100 MG/1
100 TABLET ORAL ONCE AS NEEDED
Status: DISCONTINUED | OUTPATIENT
Start: 2020-05-18 | End: 2020-05-19 | Stop reason: HOSPADM

## 2020-05-18 RX ORDER — NITROGLYCERIN 0.4 MG/1
0.4 TABLET SUBLINGUAL
Status: COMPLETED | OUTPATIENT
Start: 2020-05-18 | End: 2020-05-18

## 2020-05-18 RX ORDER — SODIUM CHLORIDE 0.9 % (FLUSH) 0.9 %
10 SYRINGE (ML) INJECTION AS NEEDED
Status: DISCONTINUED | OUTPATIENT
Start: 2020-05-18 | End: 2020-05-19 | Stop reason: HOSPADM

## 2020-05-18 RX ORDER — LIDOCAINE HYDROCHLORIDE 10 MG/ML
5 INJECTION, SOLUTION EPIDURAL; INFILTRATION; INTRACAUDAL; PERINEURAL AS NEEDED
Status: DISCONTINUED | OUTPATIENT
Start: 2020-05-18 | End: 2020-05-19 | Stop reason: HOSPADM

## 2020-05-18 RX ORDER — METOPROLOL TARTRATE 50 MG/1
50 TABLET, FILM COATED ORAL ONCE AS NEEDED
Status: DISCONTINUED | OUTPATIENT
Start: 2020-05-18 | End: 2020-05-19 | Stop reason: HOSPADM

## 2020-05-18 RX ORDER — SODIUM CHLORIDE 0.9 % (FLUSH) 0.9 %
3 SYRINGE (ML) INJECTION EVERY 12 HOURS SCHEDULED
Status: DISCONTINUED | OUTPATIENT
Start: 2020-05-18 | End: 2020-05-19 | Stop reason: HOSPADM

## 2020-05-18 RX ADMIN — NITROGLYCERIN 0.4 MG: 0.4 TABLET SUBLINGUAL at 14:16

## 2020-05-29 ENCOUNTER — TELEMEDICINE (OUTPATIENT)
Dept: CARDIOLOGY | Facility: CLINIC | Age: 60
End: 2020-05-29

## 2020-05-29 ENCOUNTER — TELEPHONE (OUTPATIENT)
Dept: CARDIOLOGY | Facility: CLINIC | Age: 60
End: 2020-05-29

## 2020-05-29 VITALS
BODY MASS INDEX: 31.54 KG/M2 | WEIGHT: 178 LBS | HEIGHT: 63 IN | DIASTOLIC BLOOD PRESSURE: 108 MMHG | SYSTOLIC BLOOD PRESSURE: 169 MMHG | HEART RATE: 66 BPM

## 2020-05-29 DIAGNOSIS — I51.7 LVH (LEFT VENTRICULAR HYPERTROPHY): ICD-10-CM

## 2020-05-29 DIAGNOSIS — K76.0 FATTY LIVER: ICD-10-CM

## 2020-05-29 DIAGNOSIS — R11.0 NAUSEA: ICD-10-CM

## 2020-05-29 DIAGNOSIS — Z72.0 TOBACCO ABUSE: ICD-10-CM

## 2020-05-29 DIAGNOSIS — R06.02 SHORTNESS OF BREATH: ICD-10-CM

## 2020-05-29 DIAGNOSIS — I77.89 ENLARGEMENT OF ABDOMINAL AORTA (HCC): Primary | ICD-10-CM

## 2020-05-29 DIAGNOSIS — R07.2 PRECORDIAL CHEST PAIN: ICD-10-CM

## 2020-05-29 DIAGNOSIS — R06.00 DYSPNEA, UNSPECIFIED TYPE: ICD-10-CM

## 2020-05-29 DIAGNOSIS — I10 ESSENTIAL HYPERTENSION: ICD-10-CM

## 2020-05-29 DIAGNOSIS — E78.2 MIXED HYPERLIPIDEMIA: ICD-10-CM

## 2020-05-29 DIAGNOSIS — I25.10 NONOBSTRUCTIVE ATHEROSCLEROSIS OF CORONARY ARTERY: ICD-10-CM

## 2020-05-29 PROCEDURE — 99214 OFFICE O/P EST MOD 30 MIN: CPT | Performed by: INTERNAL MEDICINE

## 2020-05-29 RX ORDER — AMLODIPINE BESYLATE 10 MG/1
10 TABLET ORAL DAILY
Qty: 90 TABLET | Refills: 3 | Status: SHIPPED | OUTPATIENT
Start: 2020-05-29 | End: 2020-05-29 | Stop reason: SDUPTHER

## 2020-05-29 RX ORDER — AMLODIPINE BESYLATE 10 MG/1
10 TABLET ORAL DAILY
Qty: 90 TABLET | Refills: 4 | Status: SHIPPED | OUTPATIENT
Start: 2020-05-29 | End: 2021-07-29

## 2020-05-29 NOTE — PROGRESS NOTES
Akua Aguilar  [id number]  1960  59 y.o.  female      You have chosen to receive care through a telehealth visit.  Do you consent to use a video/audio connection for your medical care today? Yes     Referring Provider: Kannan Alcaraz MD    Reason for Follow-up Visit:        Routine virtual visit using above modality   short term follow up after recent encounter Similar symptoms as during last visit   Essential Hypertension  Non obstructive coronary artery disease   States blood pressure is elevated and at times above 200/110 mmHg.  Using Clonidine but pharmacy did not give her Norvasc   Cardiac CTA as below     Subjective      Overall feels the same   No new events or complaints since last visit   Overall the patient feels no major change from baseline symptoms   Similar symptoms as during last visit     Tolerating current medications well with no untoward side effects   Compliant with prescribed medication regimen. Tries to adhere to cardiac diet.      Patient now complains of recurrent chest heaviness  This lasts for less than 5 mins  Associated diaphoresis     No nausea  No radiation   Occurs 3-4 times  a week   Started several years ago now more frequent 3 months ago  Chest pain non pleuritic  Chest pain non positional and non gustatory     Moderate chronic exertional shortness of breath on exertion relieved with rest  No significant cough or wheezing    No palpitations  No significant pedal edema    No fever or chills  No significant expectoration    No hemoptysis  No presyncope or syncope     Still smoking but much lesser and now currently smoking 6 to 8 cigarettes a day    History of present illness:  Akua Aguilar is a 59 y.o. yo female with history of  Essential Hypertension   who presents today for   Chief Complaint   Patient presents with   • Follow-up   .    History  Past Medical History:   Diagnosis Date   • Arthritis    • Cancer (CMS/HCC)     CREVICAL, UTERINE   • COPD  (chronic obstructive pulmonary disease) (CMS/HCC)    • Fatty liver    • GERD (gastroesophageal reflux disease)    • History of transfusion    • Hypertension    • Kidney stone    • Low back pain potentially associated with spinal stenosis    • Lupus (CMS/HCC)    • Migraines    • Scoliosis    • Shortness of breath    • UTI (urinary tract infection)    ,   Past Surgical History:   Procedure Laterality Date   • BACK SURGERY      lumbar   • CARDIAC CATHETERIZATION     • CARDIAC DEFIBRILLATOR PLACEMENT     •  SECTION     • CHOLECYSTECTOMY     • COLON RESECTION       or    • COLONOSCOPY     • COLONOSCOPY N/A 5/10/2018    Procedure: COLONOSCOPY WITH ANESTHESIA;  Surgeon: Shawna Valladares MD;  Location: [facility] ENDOSCOPY;  Service: Gastroenterology   • ENDOSCOPY N/A 5/10/2018    Procedure: ESOPHAGOGASTRODUODENOSCOPY WITH ANESTHESIA;  Surgeon: Shawna Valladares MD;  Location: [facility] ENDOSCOPY;  Service: Gastroenterology   • EYE SURGERY      x 2   • HYSTERECTOMY     • LEG SURGERY Right     bars/bolts placed   • UPPER GASTROINTESTINAL ENDOSCOPY     • URETEROSCOPY LASER LITHOTRIPSY WITH STENT INSERTION Right 2018    Procedure: URETEROSCOPY  WITH STENT INSERTION RETROGRADE PYELOGRAM;  Surgeon: Tyrell Hardin MD;  Location: [facility] OR;  Service: Urology   ,   Family History   Problem Relation Age of Onset   • Cancer Mother    • Heart disease Mother    • Heart disease Father    • Lupus Sister    • Heart disease Sister    • Heart disease Sister    • No Known Problems Sister    • Hypokalemia Sister    • Alcohol abuse Sister    • Colon cancer Neg Hx    • Colon polyps Neg Hx    ,   Social History     Tobacco Use   • Smoking status: Current Every Day Smoker     Packs/day: 0.50     Years: 40.00     Pack years: 20.00     Types: Cigarettes   • Smokeless tobacco: Never Used   Substance Use Topics   • Alcohol use: No   • Drug use: No   ,     Medications  Current Outpatient Medications   Medication Sig Dispense  Refill   • amLODIPine (NORVASC) 10 MG tablet Take 1 tablet by mouth Daily. 90 tablet 3   • amLODIPine (NORVASC) 10 MG tablet Take 1 tablet by mouth Daily. 90 tablet 3   • atenolol (TENORMIN) 50 MG tablet TAKE 1 TABLET BY MOUTH EVERY DAY 30 tablet 8   • Blood Pressure Monitoring (SPHYGMOMANOMETER) misc 1 Units 2 (Two) Times a Day. 1 each 0   • bumetanide (BUMEX) 0.5 MG tablet Take 1 tablet by mouth Daily. 90 tablet 3   • citalopram (CeleXA) 20 MG tablet Take 20 mg by mouth Daily.     • cloNIDine (CATAPRES-TTS) 0.2 MG/24HR patch Place 1 patch on the skin as directed by provider 1 (One) Time Per Week. 12 patch 3   • doxepin (SINEquan) 25 MG capsule 1 CAPSULE BY MOUTH AT BEDTIME FILL ON OR AFTER 1/4/2018  1   • gabapentin (NEURONTIN) 300 MG capsule Take 300 mg by mouth 2 (Two) Times a Day.     • gabapentin (NEURONTIN) 600 MG tablet Take 900 mg by mouth Every Night.     • HYDROcodone-acetaminophen (NORCO)  MG per tablet Take 1 tablet by mouth Every 6 (Six) Hours.     • hydrOXYzine pamoate (VISTARIL) 25 MG capsule Take 25 mg by mouth Every 8 (Eight) Hours As Needed.     • levETIRAcetam (KEPPRA) 500 MG tablet Take 500 mg by mouth 2 (Two) Times a Day.     • lisinopril (PRINIVIL,ZESTRIL) 40 MG tablet TAKE 1 TABLET BY MOUTH TWICE A DAY 60 tablet 7   • nitroglycerin (NITROSTAT) 0.4 MG SL tablet Take no more than 3 doses in 15 minutes. 30 tablet 8   • pantoprazole (PROTONIX) 40 MG EC tablet TAKE 1 TABLET BY MOUTH DAILY. 30 tablet 11   • vitamin D (ERGOCALCIFEROL) 16140 units capsule capsule Take 50,000 Units by mouth Every 7 (Seven) Days.       No current facility-administered medications for this visit.        Allergies:  Aspirin and Latex    Review of Systems  Review of Systems   Constitution: Positive for malaise/fatigue.   HENT: Negative.    Eyes: Negative.    Cardiovascular: Positive for chest pain, dyspnea on exertion and leg swelling. Negative for claudication, cyanosis, irregular heartbeat, near-syncope,  "orthopnea, palpitations, paroxysmal nocturnal dyspnea and syncope.   Respiratory: Negative.    Endocrine: Negative.    Hematologic/Lymphatic: Negative.    Skin: Negative.    Musculoskeletal: Positive for arthritis and back pain.   Gastrointestinal: Negative for anorexia.   Genitourinary: Negative.    Neurological: Positive for weakness.   Psychiatric/Behavioral: Negative.        Objective     Physical Exam:      BP (!) 169/108   Pulse 66   Ht 160 cm (63\")   Wt 80.7 kg (178 lb)   BMI 31.53 kg/m²     Self reported vitals above as available      • General appearance is normal  • Normal judgment and insight  • Normal assessment of mental status, which may include:  - Orientation to time, place, and person  - Recent and remote memory  - Mood and affect  • Normal external appearance of the ears and nose  • Normal assessment of hearing  • Normal external appearance of lips  • Normal external appearance of anterior surface of neck  • Normal respiratory effort  • No reported lower extremity edema  • No obvious skin abnormalities of visualized skin surfaces    • Normal examination of digits and nails (clubbing, cyanosis ,ischemia)  • No obvious abnormalities of visualized joint or musculature  • Normalized range of motion of visualized muscle groups             Results Review:      IMPRESSION:      1. Total calcium score : 253.57  2.  25 to 49% stenosis noted of the mid left anterior descending coronary artery with heavy focal circumferential calcification and underlying significant stenosis is difficult to visualize.  Overall corresponds to a CAD RADS score of 2  3.  Focal calcification of the dominant left circumflex coronary artery and the non-dominant right coronary artery without any significant stenotic lesions.        Results for orders placed during the hospital encounter of 08/08/19   Adult Transthoracic Echo Complete W/ Cont if Necessary Per Protocol    Narrative · Estimated EF = 60%.  · Left ventricular " diastolic dysfunction.  · Left ventricular wall thickness is consistent with mild concentric   hypertrophy.  · No evidence of pulmonary hypertension is present.        Cath 2016 : CONCLUSION:  1.   Normal ejection fraction of 55% with mild mitral regurgitation.   2.   Mildly elevated left ventricular diastolic pressure of 14 mmHg.   3.   Normal left dominant epicardial coronary arteries with 30% stenosis of the  mid left anterior descending coronary artery.   4.   Mild ectasia of the infrarenal aorta, for which serial monitoring is  required.   5.   No significant stenosis of bilateral renal arteries.   6.   Hydration, observation.   7.   Increase antianginals. Treat for microvascular angina as required down the  road.   8.   Control blood pressure.   9.   Increase clonidine from 0.1 mg daily to 0.1 mg b.i.d. and increase  atenolol from 25 to 50 mg daily.   10.  Intensive risk factor modification.   11.  Monitor the patient. If stable, can be discharged today and follow up with  me in 3 months.  12.  See Dr. Omega Wynne in 2-4 weeks.   Procedures    Assessment/Plan   Akua was seen today for follow-up.    Diagnoses and all orders for this visit:    Enlargement of abdominal aorta (CMS/HCC) small infra renal    Mixed hyperlipidemia    Essential hypertension    LVH (left ventricular hypertrophy) mild    Nonobstructive atherosclerosis of coronary artery    Dyspnea, unspecified type    Shortness of breath    Fatty liver    Nausea    Tobacco abuse    Precordial chest pain  -     Case Request Cath Lab: Cardiac Catheterization/Vascular Study  Radial cath    Other orders  -     amLODIPine (NORVASC) 10 MG tablet; Take 1 tablet by mouth Daily.             Plan         Keep f/u with Dr Champagne       Check BP and heart rates twice daily at least 3x / week at home and bring a recording for me to review next visit  IF BP >160/90 call sooner       She will check with her pharmacy why she has not got Norvasc, will re-prescribe        Requested Prescriptions     Signed Prescriptions Disp Refills   • amLODIPine (NORVASC) 10 MG tablet 90 tablet 3     Sig: Take 1 tablet by mouth Daily.      Recommend cardiac catheterization, selective coronary angiography, left ventriculography and percutaneous coronary intervention with application of arteriotomy hemostatic closure device.    I discussed cardiac catheterization, the procedure, risks (including bleeding, infection, vascular damage [including minor oozing, bruising, bleeding, and up to and including but not limited to the need for vascular surgery, emergency cardiothoracic surgery, contrast reaction, renal failure, respiratory failure, heart attack, stroke, arrhythmia and even death), benefits, and alternatives and the patient has voiced understanding and is willing to proceed.    Adequate pre-hydration and post cardiac catheterization hydration.  Premedications as required and indicated for cardiac catheterization.    No contraindication to drug eluting stent placement if required  Further recommendations pending results of cardiac catheterization     Right radial arterial approach for cardiac cath.       S/L NTG PRN for chest pain, call me or go to ER if has to use S/L nitroglycerin     ____________________________________________________________________________________________________________________________________________  Health maintenance and recommendations      Similar recommendations as last visit       Questions were encouraged, asked and answered to the patient's  understanding and satisfaction. Questions if any regarding current medications and side effects, need for refills and importance of compliance to medications stressed.    Reviewed available prior notes, consults, prior visits, laboratory findings, radiology and cardiology relevant reports. Updated chart as applicable. I have reviewed the patient's medical history in detail and updated the computerized patient record as  relevant.      Updated patient regarding any new or relevant abnormalities on review of records or any new findings on physical exam. Mentioned to patient about purpose of visit and desirable health short and long term goals and objectives.    Primary to monitor CBC CMP Lipid panel and TSH as applicable    ___________________________________________________________________________________________________________________________________________            Return in about 2 months (around 7/29/2020).

## 2020-05-29 NOTE — TELEPHONE ENCOUNTER
Patient called and said new prescription from Dr Padilla was sent to wrong pharmacy. [facility] called and said they had it. Patient said it needs to go to [facility] on [address] in [place]. Please send today!

## 2020-06-01 RX ORDER — ATENOLOL 50 MG/1
TABLET ORAL
Qty: 90 TABLET | Refills: 4 | Status: SHIPPED | OUTPATIENT
Start: 2020-06-01 | End: 2020-09-28

## 2020-06-02 ENCOUNTER — TRANSCRIBE ORDERS (OUTPATIENT)
Dept: ADMINISTRATIVE | Facility: HOSPITAL | Age: 60
End: 2020-06-02

## 2020-06-02 DIAGNOSIS — Z01.818 PREOP TESTING: Primary | ICD-10-CM

## 2020-06-10 ENCOUNTER — TRANSCRIBE ORDERS (OUTPATIENT)
Dept: ADMINISTRATIVE | Facility: HOSPITAL | Age: 60
End: 2020-06-10

## 2020-06-10 DIAGNOSIS — Z01.818 PRE-OP TESTING: Primary | ICD-10-CM

## 2020-06-12 ENCOUNTER — TELEPHONE (OUTPATIENT)
Dept: CARDIOLOGY | Facility: CLINIC | Age: 60
End: 2020-06-12

## 2020-06-12 ENCOUNTER — TELEPHONE (OUTPATIENT)
Dept: VASCULAR SURGERY | Age: 60
End: 2020-06-12

## 2020-06-12 NOTE — TELEPHONE ENCOUNTER
Sonja requests that someone return their call. The best time to reach her is anytime. Pt is returning a call to someone in the office. Please call the patient back today. Thank you.

## 2020-06-12 NOTE — TELEPHONE ENCOUNTER
Patient called and left a voicemail stating she has questions about her upcoming heart cath.  She can be reached at [phone].  Thank you!

## 2020-06-16 ENCOUNTER — LAB (OUTPATIENT)
Dept: LAB | Facility: HOSPITAL | Age: 60
End: 2020-06-16

## 2020-06-16 PROCEDURE — U0003 INFECTIOUS AGENT DETECTION BY NUCLEIC ACID (DNA OR RNA); SEVERE ACUTE RESPIRATORY SYNDROME CORONAVIRUS 2 (SARS-COV-2) (CORONAVIRUS DISEASE [COVID-19]), AMPLIFIED PROBE TECHNIQUE, MAKING USE OF HIGH THROUGHPUT TECHNOLOGIES AS DESCRIBED BY CMS-2020-01-R: HCPCS | Performed by: INTERNAL MEDICINE

## 2020-06-17 LAB
COVID LABCORP PRIORITY: NORMAL
SARS-COV-2 RNA RESP QL NAA+PROBE: NOT DETECTED

## 2020-06-18 ENCOUNTER — TELEPHONE (OUTPATIENT)
Dept: CARDIOLOGY | Facility: CLINIC | Age: 60
End: 2020-06-18

## 2020-06-18 NOTE — TELEPHONE ENCOUNTER
Patient called and left a voicemail stating that she is supposed to have surgery in the morning with Dr. Padilla.  I assume she is referring to her scheduled heart cath.  She states she was given no pre surgical instructions.  She can be reached at [phone].  Thank you!

## 2020-06-19 ENCOUNTER — HOSPITAL ENCOUNTER (OUTPATIENT)
Facility: HOSPITAL | Age: 60
Setting detail: HOSPITAL OUTPATIENT SURGERY
Discharge: HOME | End: 2020-06-19
Attending: INTERNAL MEDICINE | Admitting: INTERNAL MEDICINE

## 2020-06-19 VITALS
RESPIRATION RATE: 15 BRPM | OXYGEN SATURATION: 97 % | SYSTOLIC BLOOD PRESSURE: 142 MMHG | TEMPERATURE: 98 F | DIASTOLIC BLOOD PRESSURE: 107 MMHG | HEART RATE: 72 BPM

## 2020-06-19 DIAGNOSIS — R07.2 PRECORDIAL CHEST PAIN: ICD-10-CM

## 2020-06-19 LAB
ALBUMIN SERPL-MCNC: 4.5 G/DL (ref 3.5–5.2)
ALBUMIN/GLOB SERPL: 1.5 G/DL
ALP SERPL-CCNC: 81 U/L (ref 39–117)
ALT SERPL W P-5'-P-CCNC: 15 U/L (ref 1–33)
ANION GAP SERPL CALCULATED.3IONS-SCNC: 13 MMOL/L (ref 5–15)
AST SERPL-CCNC: 15 U/L (ref 1–32)
BASOPHILS # BLD AUTO: 0.04 10*3/MM3 (ref 0–0.2)
BASOPHILS NFR BLD AUTO: 0.5 % (ref 0–1.5)
BILIRUB SERPL-MCNC: 0.2 MG/DL (ref 0.2–1.2)
BUN BLD-MCNC: 16 MG/DL (ref 6–20)
BUN/CREAT SERPL: 27.6 (ref 7–25)
CALCIUM SPEC-SCNC: 9.8 MG/DL (ref 8.6–10.5)
CHLORIDE SERPL-SCNC: 101 MMOL/L (ref 98–107)
CO2 SERPL-SCNC: 26 MMOL/L (ref 22–29)
CREAT BLD-MCNC: 0.58 MG/DL (ref 0.57–1)
DEPRECATED RDW RBC AUTO: 44.7 FL (ref 37–54)
EOSINOPHIL # BLD AUTO: 0.14 10*3/MM3 (ref 0–0.4)
EOSINOPHIL NFR BLD AUTO: 1.6 % (ref 0.3–6.2)
ERYTHROCYTE [DISTWIDTH] IN BLOOD BY AUTOMATED COUNT: 13.8 % (ref 12.3–15.4)
GFR SERPL CREATININE-BSD FRML MDRD: 106 ML/MIN/1.73
GLOBULIN UR ELPH-MCNC: 3.1 GM/DL
GLUCOSE BLD-MCNC: 90 MG/DL (ref 65–99)
HCT VFR BLD AUTO: 51.4 % (ref 34–46.6)
HGB BLD-MCNC: 16.6 G/DL (ref 12–15.9)
IMM GRANULOCYTES # BLD AUTO: 0.04 10*3/MM3 (ref 0–0.05)
IMM GRANULOCYTES NFR BLD AUTO: 0.5 % (ref 0–0.5)
INR PPP: 0.96 (ref 0.91–1.09)
LYMPHOCYTES # BLD AUTO: 2.59 10*3/MM3 (ref 0.7–3.1)
LYMPHOCYTES NFR BLD AUTO: 29.4 % (ref 19.6–45.3)
MCH RBC QN AUTO: 28.7 PG (ref 26.6–33)
MCHC RBC AUTO-ENTMCNC: 32.3 G/DL (ref 31.5–35.7)
MCV RBC AUTO: 88.9 FL (ref 79–97)
MONOCYTES # BLD AUTO: 0.57 10*3/MM3 (ref 0.1–0.9)
MONOCYTES NFR BLD AUTO: 6.5 % (ref 5–12)
NEUTROPHILS # BLD AUTO: 5.42 10*3/MM3 (ref 1.7–7)
NEUTROPHILS NFR BLD AUTO: 61.5 % (ref 42.7–76)
NRBC BLD AUTO-RTO: 0 /100 WBC (ref 0–0.2)
PLATELET # BLD AUTO: 245 10*3/MM3 (ref 140–450)
PMV BLD AUTO: 9.5 FL (ref 6–12)
POTASSIUM BLD-SCNC: 4.3 MMOL/L (ref 3.5–5.2)
PROT SERPL-MCNC: 7.6 G/DL (ref 6–8.5)
PROTHROMBIN TIME: 12.4 SECONDS (ref 11.9–14.6)
RBC # BLD AUTO: 5.78 10*6/MM3 (ref 3.77–5.28)
SODIUM BLD-SCNC: 140 MMOL/L (ref 136–145)
WBC NRBC COR # BLD: 8.8 10*3/MM3 (ref 3.4–10.8)

## 2020-06-19 PROCEDURE — C1769 GUIDE WIRE: HCPCS | Performed by: INTERNAL MEDICINE

## 2020-06-19 PROCEDURE — 85025 COMPLETE CBC W/AUTO DIFF WBC: CPT | Performed by: INTERNAL MEDICINE

## 2020-06-19 PROCEDURE — 25010000002 MIDAZOLAM PER 1 MG: Performed by: INTERNAL MEDICINE

## 2020-06-19 PROCEDURE — 0 IOPAMIDOL PER 1 ML: Performed by: INTERNAL MEDICINE

## 2020-06-19 PROCEDURE — 99152 MOD SED SAME PHYS/QHP 5/>YRS: CPT | Performed by: INTERNAL MEDICINE

## 2020-06-19 PROCEDURE — 93571 IV DOP VEL&/PRESS C FLO 1ST: CPT | Performed by: INTERNAL MEDICINE

## 2020-06-19 PROCEDURE — 93458 L HRT ARTERY/VENTRICLE ANGIO: CPT | Performed by: INTERNAL MEDICINE

## 2020-06-19 PROCEDURE — 99153 MOD SED SAME PHYS/QHP EA: CPT | Performed by: INTERNAL MEDICINE

## 2020-06-19 PROCEDURE — 25010000002 HEPARIN (PORCINE): Performed by: INTERNAL MEDICINE

## 2020-06-19 PROCEDURE — 25010000002 FENTANYL CITRATE (PF) 100 MCG/2ML SOLUTION: Performed by: INTERNAL MEDICINE

## 2020-06-19 PROCEDURE — 80053 COMPREHEN METABOLIC PANEL: CPT | Performed by: INTERNAL MEDICINE

## 2020-06-19 PROCEDURE — 85610 PROTHROMBIN TIME: CPT | Performed by: INTERNAL MEDICINE

## 2020-06-19 PROCEDURE — C1887 CATHETER, GUIDING: HCPCS | Performed by: INTERNAL MEDICINE

## 2020-06-19 PROCEDURE — C1894 INTRO/SHEATH, NON-LASER: HCPCS | Performed by: INTERNAL MEDICINE

## 2020-06-19 PROCEDURE — 25010000002 DIPHENHYDRAMINE PER 50 MG: Performed by: INTERNAL MEDICINE

## 2020-06-19 PROCEDURE — 25010000002 ADENOSINE (DIAGNOSTIC) PER 30 MG: Performed by: INTERNAL MEDICINE

## 2020-06-19 RX ORDER — DIPHENHYDRAMINE HYDROCHLORIDE 50 MG/ML
INJECTION INTRAMUSCULAR; INTRAVENOUS AS NEEDED
Status: DISCONTINUED | OUTPATIENT
Start: 2020-06-19 | End: 2020-06-19 | Stop reason: HOSPADM

## 2020-06-19 RX ORDER — FENTANYL CITRATE 50 UG/ML
INJECTION, SOLUTION INTRAMUSCULAR; INTRAVENOUS AS NEEDED
Status: DISCONTINUED | OUTPATIENT
Start: 2020-06-19 | End: 2020-06-19 | Stop reason: HOSPADM

## 2020-06-19 RX ORDER — ACETAMINOPHEN 325 MG/1
650 TABLET ORAL EVERY 4 HOURS PRN
Status: DISCONTINUED | OUTPATIENT
Start: 2020-06-19 | End: 2020-06-19 | Stop reason: HOSPADM

## 2020-06-19 RX ORDER — MIDAZOLAM HYDROCHLORIDE 1 MG/ML
INJECTION INTRAMUSCULAR; INTRAVENOUS AS NEEDED
Status: DISCONTINUED | OUTPATIENT
Start: 2020-06-19 | End: 2020-06-19 | Stop reason: HOSPADM

## 2020-06-19 RX ORDER — SODIUM CHLORIDE 9 MG/ML
100 INJECTION, SOLUTION INTRAVENOUS CONTINUOUS
Status: DISCONTINUED | OUTPATIENT
Start: 2020-06-19 | End: 2020-06-19 | Stop reason: HOSPADM

## 2020-06-28 RX ORDER — HYDROCHLOROTHIAZIDE 25 MG/1
TABLET ORAL
Qty: 30 TABLET | Refills: 9 | OUTPATIENT
Start: 2020-06-28

## 2020-07-16 ENCOUNTER — LAB (OUTPATIENT)
Dept: LAB | Facility: HOSPITAL | Age: 60
End: 2020-07-16

## 2020-07-16 PROCEDURE — C9803 HOPD COVID-19 SPEC COLLECT: HCPCS | Performed by: PAIN MEDICINE

## 2020-07-16 PROCEDURE — U0003 INFECTIOUS AGENT DETECTION BY NUCLEIC ACID (DNA OR RNA); SEVERE ACUTE RESPIRATORY SYNDROME CORONAVIRUS 2 (SARS-COV-2) (CORONAVIRUS DISEASE [COVID-19]), AMPLIFIED PROBE TECHNIQUE, MAKING USE OF HIGH THROUGHPUT TECHNOLOGIES AS DESCRIBED BY CMS-2020-01-R: HCPCS | Performed by: PAIN MEDICINE

## 2020-07-17 LAB
COVID LABCORP PRIORITY: NORMAL
SARS-COV-2 RNA RESP QL NAA+PROBE: NOT DETECTED

## 2020-07-22 ENCOUNTER — TRANSCRIBE ORDERS (OUTPATIENT)
Dept: ADMINISTRATIVE | Facility: HOSPITAL | Age: 60
End: 2020-07-22

## 2020-07-22 DIAGNOSIS — Z01.818 PREOP TESTING: Primary | ICD-10-CM

## 2020-08-14 ENCOUNTER — HOSPITAL ENCOUNTER (OUTPATIENT)
Dept: ULTRASOUND IMAGING | Age: 60
Discharge: HOME | End: 2020-08-14
Payer: MEDICAID

## 2020-08-14 PROCEDURE — 93978 VASCULAR STUDY: CPT

## 2020-08-15 ENCOUNTER — TELEMEDICINE (OUTPATIENT)
Dept: CARDIOLOGY | Facility: CLINIC | Age: 60
End: 2020-08-15

## 2020-08-15 VITALS
HEIGHT: 63 IN | WEIGHT: 170 LBS | HEART RATE: 83 BPM | SYSTOLIC BLOOD PRESSURE: 162 MMHG | DIASTOLIC BLOOD PRESSURE: 94 MMHG | BODY MASS INDEX: 30.12 KG/M2

## 2020-08-15 DIAGNOSIS — N20.0 RENAL STONE: ICD-10-CM

## 2020-08-15 DIAGNOSIS — I51.7 LVH (LEFT VENTRICULAR HYPERTROPHY): ICD-10-CM

## 2020-08-15 DIAGNOSIS — R06.02 SHORTNESS OF BREATH: ICD-10-CM

## 2020-08-15 DIAGNOSIS — E78.2 MIXED HYPERLIPIDEMIA: ICD-10-CM

## 2020-08-15 DIAGNOSIS — I10 ESSENTIAL HYPERTENSION: ICD-10-CM

## 2020-08-15 DIAGNOSIS — K76.0 FATTY LIVER: ICD-10-CM

## 2020-08-15 DIAGNOSIS — I25.10 NONOBSTRUCTIVE ATHEROSCLEROSIS OF CORONARY ARTERY: ICD-10-CM

## 2020-08-15 DIAGNOSIS — R06.00 DYSPNEA, UNSPECIFIED TYPE: ICD-10-CM

## 2020-08-15 DIAGNOSIS — Z72.0 TOBACCO ABUSE: ICD-10-CM

## 2020-08-15 DIAGNOSIS — I77.89 ENLARGEMENT OF ABDOMINAL AORTA (HCC): Primary | ICD-10-CM

## 2020-08-15 DIAGNOSIS — K21.9 GASTROESOPHAGEAL REFLUX DISEASE, ESOPHAGITIS PRESENCE NOT SPECIFIED: ICD-10-CM

## 2020-08-15 PROCEDURE — 99214 OFFICE O/P EST MOD 30 MIN: CPT | Performed by: INTERNAL MEDICINE

## 2020-08-15 RX ORDER — CLONIDINE 0.3 MG/24H
1 PATCH, EXTENDED RELEASE TRANSDERMAL WEEKLY
Qty: 12 PATCH | Refills: 3 | Status: SHIPPED | OUTPATIENT
Start: 2020-08-15 | End: 2021-07-20

## 2020-08-15 NOTE — PROGRESS NOTES
Akua Aguilar  [id number]  1960  59 y.o.  female      You have chosen to receive care through a telehealth visit.  Do you consent to use a video/audio connection for your medical care today? Yes     Referring Provider: Kannan Alcaraz MD    Reason for Follow-up Visit:        Routine virtual visit using above modality   short term follow up after recent encounter Similar symptoms as during last visit   Essential Hypertension  Non obstructive coronary artery disease   States blood pressure is elevated and at times above 200/110 mmHg.  Using Clonidine but pharmacy did not give her Norvasc   Cardiac CTA as below     Subjective      Overall feels the same   No new events or complaints since last visit   Overall the patient feels no major change from baseline symptoms   Similar symptoms as during last visit     Tolerating current medications well with no untoward side effects   Compliant with prescribed medication regimen. Tries to adhere to cardiac diet.      Patient now complains of recurrent chest heaviness  This lasts for less than 5 mins  Associated diaphoresis     No nausea  No radiation   Occurs 3-4 times  a week   Started several years ago now more frequent 3 months ago  Chest pain non pleuritic  Chest pain non positional and non gustatory     Moderate chronic exertional shortness of breath on exertion relieved with rest  No significant cough or wheezing    No palpitations  No significant pedal edema    No fever or chills  No significant expectoration    No hemoptysis  No presyncope or syncope     Still smoking but much lesser and now currently smoking 6 to 8 cigarettes a day    History of present illness:  Akua Aguilar is a 59 y.o. yo female with history of  Essential Hypertension   who presents today for   No chief complaint on file.  .    History  Past Medical History:   Diagnosis Date   • Arthritis    • Cancer (CMS/HCC)     CREVICAL, UTERINE   • COPD (chronic obstructive  pulmonary disease) (CMS/HCC)    • Fatty liver    • GERD (gastroesophageal reflux disease)    • History of transfusion    • Hypertension    • Kidney stone    • Low back pain potentially associated with spinal stenosis    • Lupus (CMS/HCC)    • Migraines    • Scoliosis    • Shortness of breath    • UTI (urinary tract infection)    ,   Past Surgical History:   Procedure Laterality Date   • BACK SURGERY      lumbar   • CARDIAC CATHETERIZATION     • CARDIAC CATHETERIZATION N/A 2020    Procedure: Cardiac Catheterization/Vascular Study Radial cath;  Surgeon: Robert Padilla MD;  Location: [facility] CATH INVASIVE LOCATION;  Service: Cardiology;  Laterality: N/A;   • CARDIAC DEFIBRILLATOR PLACEMENT     •  SECTION     • CHOLECYSTECTOMY     • COLON RESECTION       or    • COLONOSCOPY     • COLONOSCOPY N/A 5/10/2018    Procedure: COLONOSCOPY WITH ANESTHESIA;  Surgeon: Shawna Valladares MD;  Location: [facility] ENDOSCOPY;  Service: Gastroenterology   • ENDOSCOPY N/A 5/10/2018    Procedure: ESOPHAGOGASTRODUODENOSCOPY WITH ANESTHESIA;  Surgeon: Shawna Valladares MD;  Location: [facility] ENDOSCOPY;  Service: Gastroenterology   • EYE SURGERY      x 2   • HYSTERECTOMY     • LEG SURGERY Right     bars/bolts placed   • UPPER GASTROINTESTINAL ENDOSCOPY     • URETEROSCOPY LASER LITHOTRIPSY WITH STENT INSERTION Right 2018    Procedure: URETEROSCOPY  WITH STENT INSERTION RETROGRADE PYELOGRAM;  Surgeon: Tyrell Hardin MD;  Location: [facility] OR;  Service: Urology   ,   Family History   Problem Relation Age of Onset   • Cancer Mother    • Heart disease Mother    • Heart disease Father    • Lupus Sister    • Heart disease Sister    • Heart disease Sister    • No Known Problems Sister    • Hypokalemia Sister    • Alcohol abuse Sister    • Colon cancer Neg Hx    • Colon polyps Neg Hx    ,   Social History     Tobacco Use   • Smoking status: Current Every Day Smoker     Packs/day: 0.50     Years: 40.00     Pack years: 20.00      Types: Cigarettes   • Smokeless tobacco: Never Used   Substance Use Topics   • Alcohol use: No   • Drug use: No   ,     Medications  Current Outpatient Medications   Medication Sig Dispense Refill   • amLODIPine (NORVASC) 10 MG tablet Take 1 tablet by mouth Daily. 90 tablet 4   • atenolol (TENORMIN) 50 MG tablet TAKE 1 TABLET BY MOUTH EVERY DAY 90 tablet 4   • Blood Pressure Monitoring (SPHYGMOMANOMETER) misc 1 Units 2 (Two) Times a Day. 1 each 0   • bumetanide (BUMEX) 0.5 MG tablet Take 1 tablet by mouth Daily. 90 tablet 3   • citalopram (CeleXA) 20 MG tablet Take 20 mg by mouth Daily.     • cloNIDine (CATAPRES-TTS) 0.2 MG/24HR patch Place 1 patch on the skin as directed by provider 1 (One) Time Per Week. 12 patch 3   • doxepin (SINEquan) 25 MG capsule 1 CAPSULE BY MOUTH AT BEDTIME FILL ON OR AFTER 1/4/2018  1   • gabapentin (NEURONTIN) 300 MG capsule Take 300 mg by mouth 2 (Two) Times a Day.     • gabapentin (NEURONTIN) 600 MG tablet Take 900 mg by mouth Every Night.     • HYDROcodone-acetaminophen (NORCO)  MG per tablet Take 1 tablet by mouth Every 6 (Six) Hours.     • hydrOXYzine pamoate (VISTARIL) 25 MG capsule Take 25 mg by mouth Every 8 (Eight) Hours As Needed.     • levETIRAcetam (KEPPRA) 500 MG tablet Take 500 mg by mouth 2 (Two) Times a Day.     • lisinopril (PRINIVIL,ZESTRIL) 40 MG tablet TAKE 1 TABLET BY MOUTH TWICE A DAY 60 tablet 7   • nitroglycerin (NITROSTAT) 0.4 MG SL tablet Take no more than 3 doses in 15 minutes. 30 tablet 8   • pantoprazole (PROTONIX) 40 MG EC tablet TAKE 1 TABLET BY MOUTH DAILY. 30 tablet 11   • vitamin D (ERGOCALCIFEROL) 34759 units capsule capsule Take 50,000 Units by mouth Every 7 (Seven) Days.       No current facility-administered medications for this visit.        Allergies:  Aspirin and Latex    Review of Systems  Review of Systems   Constitution: Positive for malaise/fatigue.   HENT: Negative.    Eyes: Negative.    Cardiovascular: Positive for chest pain,  dyspnea on exertion and leg swelling. Negative for claudication, cyanosis, irregular heartbeat, near-syncope, orthopnea, palpitations, paroxysmal nocturnal dyspnea and syncope.   Respiratory: Negative.    Endocrine: Negative.    Hematologic/Lymphatic: Negative.    Skin: Negative.    Musculoskeletal: Positive for arthritis and back pain.   Gastrointestinal: Negative for anorexia.   Genitourinary: Negative.    Neurological: Positive for weakness.   Psychiatric/Behavioral: Negative.        Objective     Physical Exam:      There were no vitals taken for this visit.    Self reported vitals above as available      • General appearance is normal  • Normal judgment and insight  • Normal assessment of mental status, which may include:  - Orientation to time, place, and person  - Recent and remote memory  - Mood and affect  • Normal external appearance of the ears and nose  • Normal assessment of hearing  • Normal external appearance of lips  • Normal external appearance of anterior surface of neck  • Normal respiratory effort  • No reported lower extremity edema  • No obvious skin abnormalities of visualized skin surfaces    • Normal examination of digits and nails (clubbing, cyanosis ,ischemia)  • No obvious abnormalities of visualized joint or musculature  • Normalized range of motion of visualized muscle groups             Results Review:      IMPRESSION:      1. Total calcium score : 253.57  2.  25 to 49% stenosis noted of the mid left anterior descending coronary artery with heavy focal circumferential calcification and underlying significant stenosis is difficult to visualize.  Overall corresponds to a CAD RADS score of 2  3.  Focal calcification of the dominant left circumflex coronary artery and the non-dominant right coronary artery without any significant stenotic lesions.        Results for orders placed during the hospital encounter of 08/08/19   Adult Transthoracic Echo Complete W/ Cont if Necessary Per  Protocol    Narrative · Estimated EF = 60%.  · Left ventricular diastolic dysfunction.  · Left ventricular wall thickness is consistent with mild concentric   hypertrophy.  · No evidence of pulmonary hypertension is present.        Cath 2016 : CONCLUSION:  1.   Normal ejection fraction of 55% with mild mitral regurgitation.   2.   Mildly elevated left ventricular diastolic pressure of 14 mmHg.   3.   Normal left dominant epicardial coronary arteries with 30% stenosis of the  mid left anterior descending coronary artery.   4.   Mild ectasia of the infrarenal aorta, for which serial monitoring is  required.   5.   No significant stenosis of bilateral renal arteries.   6.   Hydration, observation.   7.   Increase antianginals. Treat for microvascular angina as required down the  road.   8.   Control blood pressure.   9.   Increase clonidine from 0.1 mg daily to 0.1 mg b.i.d. and increase  atenolol from 25 to 50 mg daily.   10.  Intensive risk factor modification.   11.  Monitor the patient. If stable, can be discharged today and follow up with  me in 3 months.  12.  See Dr. Omega Wynne in 2-4 weeks.   Procedures    Assessment/Plan   There are no diagnoses linked to this encounter.         Plan         Keep f/u with Dr Champagne       Check BP and heart rates twice daily at least 3x / week at home and bring a recording for me to review next visit  IF BP >160/90 call sooner       She will check with her pharmacy why she has not got Norvasc, will re-prescribe       Requested Prescriptions      No prescriptions requested or ordered in this encounter      Recommend cardiac catheterization, selective coronary angiography, left ventriculography and percutaneous coronary intervention with application of arteriotomy hemostatic closure device.    I discussed cardiac catheterization, the procedure, risks (including bleeding, infection, vascular damage [including minor oozing, bruising, bleeding, and up to and including but not  limited to the need for vascular surgery, emergency cardiothoracic surgery, contrast reaction, renal failure, respiratory failure, heart attack, stroke, arrhythmia and even death), benefits, and alternatives and the patient has voiced understanding and is willing to proceed.    Adequate pre-hydration and post cardiac catheterization hydration.  Premedications as required and indicated for cardiac catheterization.    No contraindication to drug eluting stent placement if required  Further recommendations pending results of cardiac catheterization     Right radial arterial approach for cardiac cath.       S/L NTG PRN for chest pain, call me or go to ER if has to use S/L nitroglycerin     ____________________________________________________________________________________________________________________________________________  Health maintenance and recommendations      Similar recommendations as last visit       Questions were encouraged, asked and answered to the patient's  understanding and satisfaction. Questions if any regarding current medications and side effects, need for refills and importance of compliance to medications stressed.    Reviewed available prior notes, consults, prior visits, laboratory findings, radiology and cardiology relevant reports. Updated chart as applicable. I have reviewed the patient's medical history in detail and updated the computerized patient record as relevant.      Updated patient regarding any new or relevant abnormalities on review of records or any new findings on physical exam. Mentioned to patient about purpose of visit and desirable health short and long term goals and objectives.    Primary to monitor CBC CMP Lipid panel and TSH as applicable    ___________________________________________________________________________________________________________________________________________            No follow-ups on file.

## 2020-08-15 NOTE — PROGRESS NOTES
Akua Aguilar  [id number]  1960  59 y.o.  female      You have chosen to receive care through a telehealth visit.  Do you consent to use a video/audio connection for your medical care today? Yes     Referring Provider: Kannan Alcaraz MD    Reason for Follow-up Visit:      Routine virtual visit using above modality   short term follow up after recent encounter Similar symptoms as during last visit   Essential Hypertension  Non obstructive coronary artery disease   Blood pressure still elevated  Cardiac catheterization performed recently did not show any obstructive coronary artery disease    Subjective      Overall feels the same   No new events or complaints since last visit   Overall the patient feels no major change from baseline symptoms   Similar symptoms as during last visit     Tolerating current medications well with no untoward side effects   Compliant with prescribed medication regimen. Tries to adhere to cardiac diet.      Off-and-on chest pain  Can occur with exertion as well as at rest  Continues to use tobacco    No presyncope syncope  Under tremendous mental stress due to multiple family members dying from various causes    Moderate chronic exertional shortness of breath on exertion relieved with rest  No significant cough or wheezing    No palpitations  No significant pedal edema    No fever or chills  No significant expectoration    No hemoptysis  No presyncope or syncope     Still smoking but much lesser and now currently smoking 6 to 8 cigarettes a day    History of present illness:  Akua Aguilar is a 59 y.o. yo female with history of  Essential Hypertension   who presents today for   No chief complaint on file.  .    History  Past Medical History:   Diagnosis Date   • Arthritis    • Cancer (CMS/HCC)     CREVICAL, UTERINE   • COPD (chronic obstructive pulmonary disease) (CMS/HCC)    • Fatty liver    • GERD (gastroesophageal reflux disease)    • History of  transfusion    • Hypertension    • Kidney stone    • Low back pain potentially associated with spinal stenosis    • Lupus (CMS/HCC)    • Migraines    • Scoliosis    • Shortness of breath    • UTI (urinary tract infection)    ,   Past Surgical History:   Procedure Laterality Date   • BACK SURGERY      lumbar   • CARDIAC CATHETERIZATION     • CARDIAC CATHETERIZATION N/A 2020    Procedure: Cardiac Catheterization/Vascular Study Radial cath;  Surgeon: Robert Padilla MD;  Location: [facility] CATH INVASIVE LOCATION;  Service: Cardiology;  Laterality: N/A;   • CARDIAC DEFIBRILLATOR PLACEMENT     •  SECTION     • CHOLECYSTECTOMY     • COLON RESECTION       or    • COLONOSCOPY     • COLONOSCOPY N/A 5/10/2018    Procedure: COLONOSCOPY WITH ANESTHESIA;  Surgeon: Shawna Valladares MD;  Location: [facility] ENDOSCOPY;  Service: Gastroenterology   • ENDOSCOPY N/A 5/10/2018    Procedure: ESOPHAGOGASTRODUODENOSCOPY WITH ANESTHESIA;  Surgeon: Shanwa Valladares MD;  Location: [facility] ENDOSCOPY;  Service: Gastroenterology   • EYE SURGERY      x 2   • HYSTERECTOMY     • LEG SURGERY Right     bars/bolts placed   • UPPER GASTROINTESTINAL ENDOSCOPY     • URETEROSCOPY LASER LITHOTRIPSY WITH STENT INSERTION Right 2018    Procedure: URETEROSCOPY  WITH STENT INSERTION RETROGRADE PYELOGRAM;  Surgeon: Tyrell Hardin MD;  Location: [facility] OR;  Service: Urology   ,   Family History   Problem Relation Age of Onset   • Cancer Mother    • Heart disease Mother    • Heart disease Father    • Lupus Sister    • Heart disease Sister    • Heart disease Sister    • No Known Problems Sister    • Hypokalemia Sister    • Alcohol abuse Sister    • Colon cancer Neg Hx    • Colon polyps Neg Hx    ,   Social History     Tobacco Use   • Smoking status: Current Every Day Smoker     Packs/day: 0.50     Years: 40.00     Pack years: 20.00     Types: Cigarettes   • Smokeless tobacco: Never Used   Substance Use Topics   • Alcohol use: No   •  Drug use: No   ,     Medications  Current Outpatient Medications   Medication Sig Dispense Refill   • amLODIPine (NORVASC) 10 MG tablet Take 1 tablet by mouth Daily. 90 tablet 4   • atenolol (TENORMIN) 50 MG tablet TAKE 1 TABLET BY MOUTH EVERY DAY 90 tablet 4   • Blood Pressure Monitoring (SPHYGMOMANOMETER) misc 1 Units 2 (Two) Times a Day. 1 each 0   • bumetanide (BUMEX) 0.5 MG tablet Take 1 tablet by mouth Daily. 90 tablet 3   • citalopram (CeleXA) 20 MG tablet Take 20 mg by mouth Daily.     • cloNIDine (CATAPRES-TTS) 0.2 MG/24HR patch Place 1 patch on the skin as directed by provider 1 (One) Time Per Week. 12 patch 3   • doxepin (SINEquan) 25 MG capsule 1 CAPSULE BY MOUTH AT BEDTIME FILL ON OR AFTER 1/4/2018  1   • gabapentin (NEURONTIN) 300 MG capsule Take 300 mg by mouth 2 (Two) Times a Day.     • gabapentin (NEURONTIN) 600 MG tablet Take 900 mg by mouth Every Night.     • HYDROcodone-acetaminophen (NORCO)  MG per tablet Take 1 tablet by mouth Every 6 (Six) Hours.     • hydrOXYzine pamoate (VISTARIL) 25 MG capsule Take 25 mg by mouth Every 8 (Eight) Hours As Needed.     • levETIRAcetam (KEPPRA) 500 MG tablet Take 500 mg by mouth 2 (Two) Times a Day.     • lisinopril (PRINIVIL,ZESTRIL) 40 MG tablet TAKE 1 TABLET BY MOUTH TWICE A DAY 60 tablet 7   • nitroglycerin (NITROSTAT) 0.4 MG SL tablet Take no more than 3 doses in 15 minutes. 30 tablet 8   • pantoprazole (PROTONIX) 40 MG EC tablet TAKE 1 TABLET BY MOUTH DAILY. 30 tablet 11   • vitamin D (ERGOCALCIFEROL) 47603 units capsule capsule Take 50,000 Units by mouth Every 7 (Seven) Days.       No current facility-administered medications for this visit.        Allergies:  Aspirin and Latex    Review of Systems  Review of Systems   Constitution: Positive for malaise/fatigue.   HENT: Negative.    Eyes: Negative.    Cardiovascular: Positive for chest pain, dyspnea on exertion and leg swelling. Negative for claudication, cyanosis, irregular heartbeat,  "near-syncope, orthopnea, palpitations, paroxysmal nocturnal dyspnea and syncope.   Respiratory: Negative.    Endocrine: Negative.    Hematologic/Lymphatic: Negative.    Skin: Negative.    Musculoskeletal: Positive for arthritis and back pain.   Gastrointestinal: Negative for anorexia.   Genitourinary: Negative.    Neurological: Positive for weakness.   Psychiatric/Behavioral: Negative.        Objective     Physical Exam:      /94   Pulse 83   Ht 160 cm (63\")   Wt 77.1 kg (170 lb)   BMI 30.11 kg/m²     Self reported vitals above as available      • General appearance is normal  • Normal judgment and insight  • Normal assessment of mental status, which may include:  - Orientation to time, place, and person  - Recent and remote memory  - Mood and affect  • Normal external appearance of the ears and nose  • Normal assessment of hearing  • Normal external appearance of lips  • Normal external appearance of anterior surface of neck  • Normal respiratory effort  • No reported lower extremity edema  • No obvious skin abnormalities of visualized skin surfaces    • Normal examination of digits and nails (clubbing, cyanosis ,ischemia)  • No obvious abnormalities of visualized joint or musculature  • Normalized range of motion of visualized muscle groups             Results Review:    Conclusion     50% stenosis of proximal left anterior descending coronary artery with a normal fractional flow reserve 0.9 or higher with a large circumferential plaque burden for which aggressive medical therapy is advised  Large dominant left circumflex coronary artery with minor atherosclerotic plaque in the midsegment without any significant lesions  Right coronary artery is nondominant small and normal  Moderately elevated left ventricular end-diastolic pressure of 24 mmHg  Normal left ventricular ejection fraction of 55% without any discrete wall motion abnormalities and no significant mitral regurgitation           Plan     Keep " LDL well below 70 mg/dL  Treat for elevated left ventricular end-diastolic pressure  Can be discharged home today after period of observation  Hydration   Observation  Risk factor modifications           IMPRESSION:      1. Total calcium score : 253.57  2.  25 to 49% stenosis noted of the mid left anterior descending coronary artery with heavy focal circumferential calcification and underlying significant stenosis is difficult to visualize.  Overall corresponds to a CAD RADS score of 2  3.  Focal calcification of the dominant left circumflex coronary artery and the non-dominant right coronary artery without any significant stenotic lesions.        Results for orders placed during the hospital encounter of 08/08/19   Adult Transthoracic Echo Complete W/ Cont if Necessary Per Protocol    Narrative · Estimated EF = 60%.  · Left ventricular diastolic dysfunction.  · Left ventricular wall thickness is consistent with mild concentric   hypertrophy.  · No evidence of pulmonary hypertension is present.        Cath 2016 : CONCLUSION:  1.   Normal ejection fraction of 55% with mild mitral regurgitation.   2.   Mildly elevated left ventricular diastolic pressure of 14 mmHg.   3.   Normal left dominant epicardial coronary arteries with 30% stenosis of the  mid left anterior descending coronary artery.   4.   Mild ectasia of the infrarenal aorta, for which serial monitoring is  required.   5.   No significant stenosis of bilateral renal arteries.   6.   Hydration, observation.   7.   Increase antianginals. Treat for microvascular angina as required down the  road.   8.   Control blood pressure.   9.   Increase clonidine from 0.1 mg daily to 0.1 mg b.i.d. and increase  atenolol from 25 to 50 mg daily.   10.  Intensive risk factor modification.   11.  Monitor the patient. If stable, can be discharged today and follow up with  me in 3 months.  12.  See Dr. Omega Wynne in 2-4 weeks.   Procedures    Assessment/Plan   Diagnoses and  all orders for this visit:    Enlargement of abdominal aorta (CMS/HCC) small infra renal    Mixed hyperlipidemia    Essential hypertension    LVH (left ventricular hypertrophy) mild    Nonobstructive atherosclerosis of coronary artery    Dyspnea, unspecified type    Shortness of breath    Fatty liver    Gastroesophageal reflux disease, esophagitis presence not specified    Renal stone    Tobacco abuse               ____________________________________________________________________________________________________________________________________________  Health maintenance and recommendations      Similar recommendations as last visit       Questions were encouraged, asked and answered to the patient's  understanding and satisfaction. Questions if any regarding current medications and side effects, need for refills and importance of compliance to medications stressed.    Reviewed available prior notes, consults, prior visits, laboratory findings, radiology and cardiology relevant reports. Updated chart as applicable. I have reviewed the patient's medical history in detail and updated the computerized patient record as relevant.      Updated patient regarding any new or relevant abnormalities on review of records or any new findings on physical exam. Mentioned to patient about purpose of visit and desirable health short and long term goals and objectives.    Primary to monitor CBC CMP Lipid panel and TSH as applicable    ___________________________________________________________________________________________________________________________________________     Plan     Patient has had an abdominal aortic ultrasound the results of which are currently not available this was done yesterday    Check BP and heart rates twice daily at least 3x / week at home and bring a recording for me to review next visit  IF BP >160/90 call sooner     We will further escalate antihypertensive treatment    Increase Clonidine    Requested Prescriptions     Signed Prescriptions Disp Refills   • cloNIDine (CATAPRES-TTS) 0.3 MG/24HR patch 12 patch 3     Sig: Place 1 patch on the skin as directed by provider 1 (One) Time Per Week.    Can take PO clonidine 0.1 mg BID prn for BP > 140/90 mm Hg       S/L NTG PRN for chest pain, call me or go to ER if has to use S/L nitroglycerin     Follow up with Anel HERNANDEZ to address interim issues         Return in about 6 weeks (around 9/26/2020).

## 2020-08-25 ENCOUNTER — TRANSCRIBE ORDERS (OUTPATIENT)
Dept: ADMINISTRATIVE | Facility: HOSPITAL | Age: 60
End: 2020-08-25

## 2020-08-25 DIAGNOSIS — Z01.818 PREOP TESTING: Primary | ICD-10-CM

## 2020-08-28 ENCOUNTER — LAB (OUTPATIENT)
Dept: LAB | Facility: HOSPITAL | Age: 60
End: 2020-08-28

## 2020-08-28 ENCOUNTER — TELEPHONE (OUTPATIENT)
Dept: NEUROLOGY | Age: 60
End: 2020-08-28

## 2020-08-28 PROCEDURE — U0003 INFECTIOUS AGENT DETECTION BY NUCLEIC ACID (DNA OR RNA); SEVERE ACUTE RESPIRATORY SYNDROME CORONAVIRUS 2 (SARS-COV-2) (CORONAVIRUS DISEASE [COVID-19]), AMPLIFIED PROBE TECHNIQUE, MAKING USE OF HIGH THROUGHPUT TECHNOLOGIES AS DESCRIBED BY CMS-2020-01-R: HCPCS | Performed by: PAIN MEDICINE

## 2020-08-28 PROCEDURE — C9803 HOPD COVID-19 SPEC COLLECT: HCPCS | Performed by: PAIN MEDICINE

## 2020-08-29 LAB
COVID LABCORP PRIORITY: NORMAL
SARS-COV-2 RNA RESP QL NAA+PROBE: NOT DETECTED

## 2020-08-31 ENCOUNTER — TELEPHONE (OUTPATIENT)
Dept: VASCULAR SURGERY | Age: 60
End: 2020-08-31

## 2020-09-01 ENCOUNTER — VIRTUAL VISIT (OUTPATIENT)
Dept: VASCULAR SURGERY | Age: 60
End: 2020-09-01
Payer: MEDICAID

## 2020-09-01 PROCEDURE — 99442 PR PHYS/QHP TELEPHONE EVALUATION 11-20 MIN: CPT | Performed by: NURSE PRACTITIONER

## 2020-09-01 RX ORDER — CLONIDINE 0.3 MG/24H
PATCH, EXTENDED RELEASE TRANSDERMAL
COMMUNITY
Start: 2020-08-17

## 2020-09-01 RX ORDER — LEVETIRACETAM 500 MG/1
500 TABLET ORAL 2 TIMES DAILY
COMMUNITY
Start: 2020-04-09

## 2020-09-01 RX ORDER — CITALOPRAM 20 MG/1
40 TABLET ORAL DAILY
COMMUNITY
Start: 2020-04-03 | End: 2021-09-03

## 2020-09-01 RX ORDER — CLONIDINE 0.1 MG/24H
PATCH, EXTENDED RELEASE TRANSDERMAL
COMMUNITY
Start: 2020-08-02

## 2020-09-01 NOTE — PROGRESS NOTES
Dimitris Mode is a 61 y.o. female evaluated via telephone on 9/1/2020. Consent:  She and/or health care decision maker is aware that that she may receive a bill for this telephone service, depending on her insurance coverage, and has provided verbal consent to proceed: Yes    Patient is located at home  Provider is located at [facility]   Also present during call is friend    She has a known history of abdominal aortic aneurysm for 1 - 5 years. She has not had abdominal pain. She has not had back pain in the cervical spine, thoracic spine, lumbar spine and sacral spine region. This pain is unchanged since the last visit. Pain is rated as 0. Dimitris Mode is a 61 y.o. female with the following history reviewed and recorded in [facility]:  Patient Active Problem List    Diagnosis Date Noted    Vitamin D deficiency 09/24/2019    Myoclonus 03/22/2019    Essential tremor 03/22/2019    Memory loss 03/22/2019    Osteoporosis 03/22/2019    Obstructive sleep apnea 03/16/2018    Daytime somnolence 03/16/2018    Snoring 03/16/2018    Witnessed apneic spells 03/16/2018    Sleep disturbance 03/16/2018    Insomnia 03/16/2018    Ectatic aorta (HCC) 06/05/2017    Status post placement of implantable loop recorder     Syncope     S/P excision of lipoma 11/05/2013    Fibrocystic breast disease 09/19/2013    Lump of breast, right 09/19/2013    Breast nodule 09/19/2013    Palpitations 11/16/2011    Vasovagal syncope 09/21/2011    Chest pain 09/21/2011    Arthritis     Lupus (HCC)     Hyperlipidemia     HTN (hypertension)     Fatigue      Current Outpatient Medications   Medication Sig Dispense Refill    citalopram (CELEXA) 20 MG tablet Take 40 mg by mouth daily      cloNIDine (CATAPRES) 0.3 MG/24HR PTWK PLACE 1 PATCH ON THE SKIN AS DIRECTED BY PROVIDER 1 (ONE) TIME PER WEEK.       levETIRAcetam (KEPPRA) 500 MG tablet Take 500 mg by mouth 2 times daily      cloNIDine (CATAPRES) 0.1 MG/24HR PTWK APPLY 1 PATCH ON THE SKIN ONCE A WEEK      bumetanide (BUMEX) 0.5 MG tablet TK 1 T PO D  3    vitamin D (ERGOCALCIFEROL) 99087 units CAPS capsule As directed 22 capsule 0    pantoprazole (PROTONIX) 40 MG tablet Take 40 mg by mouth daily  11    amLODIPine (NORVASC) 5 MG tablet Take 5 mg by mouth daily  5    atenolol (TENORMIN) 50 MG tablet Take 50 mg by mouth daily  5    gabapentin (NEURONTIN) 300 MG capsule Take 300 mg by mouth 2 times daily. 0    gabapentin (NEURONTIN) 600 MG tablet Take 1.5 tablets by mouth nightly. 0    lisinopril (PRINIVIL;ZESTRIL) 40 MG tablet Take 40 mg by mouth 2 times daily  5    doxepin (SINEQUAN) 100 MG capsule Take 100 mg by mouth nightly      SUMAtriptan (IMITREX) 100 MG tablet Take 100 mg by mouth once as needed for Migraine      HYDROcodone-acetaminophen (NORCO)  MG per tablet Take 1 tablet by mouth every 6 hours as needed for Pain      hydrochlorothiazide (HYDRODIURIL) 25 MG tablet Take 25 mg by mouth daily      cloNIDine (CATAPRES) 0.1 MG tablet Take 0.1 mg by mouth 2 times daily.  estrogens, conjugated, (PREMARIN) 0.625 MG tablet Take 0.625 mg by mouth daily.  nitroGLYCERIN (NITROSTAT) 0.4 MG SL tablet Place 0.4 mg under the tongue every 5 minutes as needed. No current facility-administered medications for this visit. Allergies: Latex;  Aspirin; Bee venom; and Pollen extract  Past Medical History:   Diagnosis Date    Acid reflux     Anxiety     Arthritis     CAD (coronary artery disease)     CAD (coronary artery disease)     Cervical dysplasia     Cervical spondylosis     Chest pain     Chronic back pain     Chronic bronchitis (HCC)     Chronic kidney disease     Chronic low back pain     Chronic pulmonary disease     COPD (chronic obstructive pulmonary disease) (HCC)     COPD (chronic obstructive pulmonary disease) (HCC)     Degeneration of cervical disc without myelopathy     Degenerative lumbar disc     Depression     Emphysema (subcutaneous) (surgical) resulting from a procedure     Fatigue     HTN (hypertension)     Hyperlipidemia     Hypertension     IBS (irritable bowel syndrome)     Lumbar spondylosis     Lump     right buttock    Lupus (HCC)     MI, old     Osteoarthritis     Osteoarthritis     Peripheral vascular disease [facility] [address]     Sacroiliitis ([facility] [address]     Seizures ([facility] [address]     remote hx    Status post placement of implantable loop recorder     Syncope     Systemic lupus erythematosus ([facility] [address]     Vasovagal syncope     has a loop recorder    Warts     hands     Past Surgical History:   Procedure Laterality Date    CARDIAC CATHETERIZATION  11    Normal LV function, Normal coronaries    CARDIAC SURGERY  11    Loop Recorder placed    CARDIAC SURGERY      defibrillator (added to medical record on 3/22/19)     SECTION      GALLBLADDER SURGERY      HYSTERECTOMY      HUANG with BSO due to cervical dysplasia    LEG SURGERY      X2     LIPOMA RESECTION  10/14/13    bilat sacroiliac lipoma exc    OVARIAN CYST REMOVAL       Family History   Problem Relation Age of Onset    Coronary Art Dis Other     Diabetes Other     Stroke Other     Cancer Mother         lung, and possible breast    Cancer Sister         cervical    Early Death Maternal Grandmother     Cancer Paternal Grandmother         ovarian    Cancer Paternal Grandfather         leukemia     Social History     Tobacco Use    Smoking status: Current Every Day Smoker     Packs/day: 1.00     Years: 40.00     Pack years: 40.00    Smokeless tobacco: Never Used   Substance Use Topics    Alcohol use: No         Review of Systems    Constitutional - no significant activity change, appetite change, or unexpected weight change. No fever or chills. No diaphoresis or significant fatigue. HENT - no significant rhinorrhea or epistaxis. No tinnitus or significant hearing loss.   Eyes - no sudden vision change or amaurosis. Respiratory - no significant shortness of breath, wheezing, or stridor. No apnea, cough, or chest tightness associated with shortness of breath. Cardiovascular - no chest pain, syncope, or significant dizziness. No palpitations or significant leg swelling.  has not had claudication. Gastrointestinal - has not had abdominal swelling or pain. No blood in stool. No severe constipation, diarrhea, nausea, or vomiting. Genitourinary - No difficulty urinating, dysuria, frequency, or urgency. No flank pain or hematuria. Musculoskeletal - has not had back pain, gait disturbance, or myalgia. Skin - no color change, rash, pallor, has not had new wound. Neurologic - no dizziness, facial asymmetry, or light headedness. No seizures. No speech difficulty or lateralizing weakness. Hematologic - no easy bruising or excessive bleeding. Psychiatric - no severe anxiety or nervousness. No confusion. All other review of systems are negative. PHYSICAL EXAMINATION:    [ INSTRUCTIONS:  \"[x]\" Indicates a positive item  \"[]\" Indicates a negative item  -- DELETE ALL ITEMS NOT EXAMINED]    [x] Alert  [x] Oriented to person/place/time    [x] No apparent distress  [] Toxic appearing  [x] Normal Mood  [] Anxious appearing    [] Depressed appearing  [] Confused appearing      [] Poor short term memory  [] Poor long term memory   Memory appears to be intact         Aortic ultrasound:  2.9 cm abdominal aortic aneurysm. This aneurysm has increased since the last visit. Individual films reviewed:  Yes. These results have been reviewed with the patient. Assessment    1. Ectatic aorta (HCC)    2. Hypertension, unspecified type          Plan    24 months with aortic u/s  Renal u/s with doppler for progressive uncontrolled htn  Strongly encouraged start/continue statin therapy  Recommended no smoking    Documentation:  I communicated with the patient and/or health care decision maker about htn, aortic u/s. Details of this discussion including any medical advice provided: as above      I affirm this is a Patient Initiated Episode with an Established Patient who has not had a related appointment within my department in the past 7 days or scheduled within the next 24 hours.     Total Time: minutes: 11-20 minutes    Note: not billable if this call serves to triage the patient into an appointment for the relevant concern      [facility]

## 2020-09-04 RX ORDER — BUMETANIDE 0.5 MG/1
0.5 TABLET ORAL DAILY
Qty: 90 TABLET | Refills: 3 | Status: SHIPPED | OUTPATIENT
Start: 2020-09-04 | End: 2020-09-28 | Stop reason: SDUPTHER

## 2020-09-14 RX ORDER — CLONIDINE HYDROCHLORIDE 0.1 MG/1
TABLET ORAL
Qty: 60 TABLET | Refills: 9 | OUTPATIENT
Start: 2020-09-14

## 2020-09-23 RX ORDER — CLONIDINE HYDROCHLORIDE 0.1 MG/1
TABLET ORAL
Qty: 60 TABLET | Refills: 9 | OUTPATIENT
Start: 2020-09-23

## 2020-09-23 NOTE — TELEPHONE ENCOUNTER
Not sure if patient is still taking this medication. There is a note saying Dr Padilla discontinued because therapy is complete. Could you check into this?

## 2020-09-28 ENCOUNTER — OFFICE VISIT (OUTPATIENT)
Dept: CARDIOLOGY | Facility: CLINIC | Age: 60
End: 2020-09-28

## 2020-09-28 VITALS — HEIGHT: 63 IN | BODY MASS INDEX: 30.11 KG/M2

## 2020-09-28 DIAGNOSIS — M32.9 LUPUS (HCC): ICD-10-CM

## 2020-09-28 DIAGNOSIS — E78.2 MIXED HYPERLIPIDEMIA: ICD-10-CM

## 2020-09-28 DIAGNOSIS — K21.9 GASTROESOPHAGEAL REFLUX DISEASE WITHOUT ESOPHAGITIS: ICD-10-CM

## 2020-09-28 DIAGNOSIS — I10 ESSENTIAL HYPERTENSION: ICD-10-CM

## 2020-09-28 DIAGNOSIS — I77.89 ENLARGEMENT OF ABDOMINAL AORTA (HCC): ICD-10-CM

## 2020-09-28 DIAGNOSIS — I51.7 LVH (LEFT VENTRICULAR HYPERTROPHY): ICD-10-CM

## 2020-09-28 DIAGNOSIS — I25.10 NONOBSTRUCTIVE ATHEROSCLEROSIS OF CORONARY ARTERY: Primary | ICD-10-CM

## 2020-09-28 DIAGNOSIS — E66.09 CLASS 1 OBESITY DUE TO EXCESS CALORIES WITH SERIOUS COMORBIDITY AND BODY MASS INDEX (BMI) OF 30.0 TO 30.9 IN ADULT: ICD-10-CM

## 2020-09-28 DIAGNOSIS — Z72.0 TOBACCO ABUSE: ICD-10-CM

## 2020-09-28 PROCEDURE — 99214 OFFICE O/P EST MOD 30 MIN: CPT | Performed by: NURSE PRACTITIONER

## 2020-09-28 RX ORDER — ALBUTEROL SULFATE 90 UG/1
2 AEROSOL, METERED RESPIRATORY (INHALATION) EVERY 6 HOURS PRN
COMMUNITY
Start: 2020-09-06

## 2020-09-28 RX ORDER — CARVEDILOL 25 MG/1
25 TABLET ORAL 2 TIMES DAILY
Qty: 180 TABLET | Refills: 3 | Status: SHIPPED | OUTPATIENT
Start: 2020-09-28 | End: 2021-09-24

## 2020-09-28 RX ORDER — CLONIDINE HYDROCHLORIDE 0.1 MG/1
0.1 TABLET ORAL 2 TIMES DAILY
COMMUNITY
Start: 2020-08-19 | End: 2020-09-28 | Stop reason: SDUPTHER

## 2020-09-28 RX ORDER — SUMATRIPTAN 50 MG/1
TABLET, FILM COATED ORAL ONCE AS NEEDED
COMMUNITY
Start: 2020-09-21 | End: 2021-05-18

## 2020-09-28 RX ORDER — CLONIDINE HYDROCHLORIDE 0.1 MG/1
0.1 TABLET ORAL 2 TIMES DAILY
Qty: 180 TABLET | Refills: 3 | Status: SHIPPED | OUTPATIENT
Start: 2020-09-28 | End: 2020-12-09 | Stop reason: SDUPTHER

## 2020-09-28 NOTE — PROGRESS NOTES
Subjective:     Encounter Date:09/28/2020      Patient ID: Akua Aguilar is a 60 y.o. female   HPI: Her telephone visit today is for routine follow up of medication adjustments. At LOV, via telemedicine, clonidine was increased to 0.3mg/24hr patch. She also has clonidine 0.1 mg BID prn available. Since LOV, recent request to refill PO clonidine was denied so that her hypertensive regimen could be reviewed today. She reports continued high blood pressures. She complains of insomnia and increased fatigue over the last week. Patient has a history of nonobstructive coronary artery disease, hypertension, hyperlipidemia, left ventricular hypertrophy, enlargement of abdominal aorta, GERD, and smoking. [facility] of aorta 8/14/2020 at [facility] revealed no evidence of AAA. Patient denies chest pain, shortness of breath, dizziness, syncope, orthopnea, PND, edema, and decreased stamina. Patient denies signs of bleeding.    Chief Complaint: routine follow up  Hypertension  This is a chronic problem. The current episode started more than 1 year ago. The problem is uncontrolled. Associated symptoms include malaise/fatigue. Pertinent negatives include no anxiety, blurred vision, chest pain, headaches, neck pain, orthopnea, palpitations, peripheral edema, PND, shortness of breath or sweats. There are no associated agents to hypertension. Risk factors for coronary artery disease include dyslipidemia, post-menopausal state and obesity. Current antihypertension treatment includes alpha 1 blockers, diuretics, calcium channel blockers, beta blockers and ACE inhibitors. There is no history of angina, kidney disease, CAD/MI, CVA, heart failure, left ventricular hypertrophy, PVD or retinopathy. There is no history of chronic renal disease, coarctation of the aorta, hyperaldosteronism, hypercortisolism, hyperparathyroidism, a hypertension causing med, pheochromocytoma, renovascular disease, sleep apnea or a thyroid problem.    Hyperlipidemia  This is a chronic problem. The current episode started more than 1 year ago. The problem is uncontrolled. Recent lipid tests were reviewed and are high. Exacerbating diseases include obesity. She has no history of chronic renal disease, diabetes, hypothyroidism, liver disease or nephrotic syndrome. Pertinent negatives include no chest pain, focal sensory loss, focal weakness, leg pain, myalgias or shortness of breath. She is currently on no antihyperlipidemic treatment. Risk factors for coronary artery disease include dyslipidemia, hypertension and post-menopausal.       Previous Cardiac Testing:  Results for orders placed during the hospital encounter of 08/08/19   Adult Transthoracic Echo Complete W/ Cont if Necessary Per Protocol    Narrative · Estimated EF = 60%.  · Left ventricular diastolic dysfunction.  · Left ventricular wall thickness is consistent with mild concentric   hypertrophy.  · No evidence of pulmonary hypertension is present.        Results for orders placed during the hospital encounter of 06/19/20   Cardiac Catheterization/Vascular Study    Narrative Cardiac Catheterization Operative Report    Patient was referred for cardiac catheterization .   Indications for the procedure include: Symptoms suggestive of angina with   high suspicion for significant obstructive coronary artery disease   With ongoing chest pain    Procedure performed    Coronary angiography  Left heart catheterization  Left ventriculography  Fractional flow reserve of the left anterior descending coronary artery  Supervision of the administration of moderate sedation    Fractional flow reserve of left anterior descending coronary artery using   Ikari 3.56 Hebrew guide  Usual protocol was followed.  Adenosine was used to the or hyperemia.    Guide used as mentioned above.  The guide was advanced and the ostium of the vessel was engaged.  We   zeroed the status post aortic pressure then we advanced a fractional  flow   reserve wire.  This was equalized prior to crossing the lesion.  Then we   crossed the lesion.  Anticoagulation was used prior to insertion of the   wire.  After crossing the lesion adenosine was used.  This caused   hyperemia.  Fractional flow reserve was then performed and documented as   noted.  End of case the wire was removed removed angiography was performed   before and after removing the wire to document no complication and decide   results were achieved.       Procedure Details  The risks, benefits, complications, treatment options, and expected   outcomes were discussed with the patient. Plan is for moderate sedation,   and the patient agrees to proceed with the procedure.  An immediate   assessment was done prior to the administration of moderate sedation.     The patient and/or family concurred with the proposed plan, giving   informed consent. Patient was brought to the cath lab after IV hydration   was begun and oral premedication was given. The was further sedated with   Fentanyl  and Versed.    The patient was prepped and draped in the usual manner. Using the modified   Seldinger access technique, a 6f Khmer sheath was placed in the right   radial  artery.   A left heart catheterization was done. Angiograms were also done.        Cardiac Catheterization Operative Report      Patient was referred for cardiac catheterization: High suspicion for   coronary artery disease    Procedure Details  The risks, benefits, complications, treatment options, and expected   outcomes were discussed with the patient. Plan is for moderate sedation,   and the patient agrees to proceed with the procedure.  An immediate   assessment was done prior to the administration of moderate sedation.    The patient and/or family concurred with the proposed plan, giving   informed consent. Patient was brought to the cath lab after IV hydration   was begun and oral premedication was given.     The skin overlying the patient's  right radial artery was prepped and   draped in the usual sterile fashion.  Timeout was taken to confirm the   correct patient and procedure.  Lidocaine was administered for local   anesthesia.  IV Versed and fentanyl were used to achieve conscious   sedation.  Modified Seldinger technique was then used to place a 6 Micronesian   sheath in the right radial artery    Diagnostic coronary angiography was performed with Tig coronary catheter.    Coronary angiogram were performed in Spanish and JIMÉNEZ projection to evaluate   the coronary arterial systems.      A TR band device was used to achieve hemostasis at the end of the   procedure.  The patient tolerated the procedure well, and there were no   immediate complications.    Procedural Details: After written and informed consent was obtained, the   patient was brought to the cath lab in a fasting state.       1. Selective coronary angiography:    Left main coronary artery:  The left main coronary artery arises from the   left coronary cusp and bifurcates into the  left anterior descending   coronary artery  and left circumflex arteries.      Left anterior descending artery:  The  left anterior descending coronary   artery   arises normally from the left main coronary artery and courses in   the anterior interventricular groove and terminates at the apex.  50%   stenosis noted of the proximal left anterior descending coronary artery   with large circumferential soft atherosclerotic plaque  Normal fractional flow reserve of 0.9 or higher    Left circumflex:  The left circumflex arises form the left man artery and   supplies obtuse marginal branches.  Minor atherosclerotic plaque noted in   the mid left circumflex coronary artery which is dominant and large    Right coronary artery:  The right coronary artery  arises normally from   the right coronary cusp and is dominant for the posterior circulation.    The right coronary artery  is non-dominant and normal.    2. Left heart cath:  Moderately elevated left ventricular end-diastolic   pressure of 24 mmHg.    3. LV Gram: Normal left ventricular ejection fraction of 55% with no   regional wall motion abnormalities.    4. Interventions: None    Estimated Blood Loss:  Minimal    Specimens: None         Complications:  None; patient tolerated the procedure well.           Disposition: Cardiovascular observation unit           Condition: stable            I supervised the administration of conscious sedation by nursing staff   throughout the case.  First dose was given at 1119 and the end of my   face-to-face encounter was at 1145 Hours.    During the case, continuous pulse oximetry, heart rate, blood pressure,   and patient status were monitored.       Risk, Benefits, and Alternatives discussed with the patient and/or family.    Plan is for moderate sedation, and the patient agrees to proceed with the   procedure.  An immediate assessment was done prior to the administration   of moderate sedation      Conclusion    50% stenosis of proximal left anterior descending coronary artery with a   normal fractional flow reserve 0.9 or higher with a large circumferential   plaque burden for which aggressive medical therapy is advised  Large dominant left circumflex coronary artery with minor atherosclerotic   plaque in the midsegment without any significant lesions  Right coronary artery is nondominant small and normal  Moderately elevated left ventricular end-diastolic pressure of 24 mmHg  Normal left ventricular ejection fraction of 55% without any discrete wall   motion abnormalities and no significant mitral regurgitation        Plan    Keep LDL well below 70 mg/dL  Treat for elevated left ventricular end-diastolic pressure  Can be discharged home today after period of observation  Hydration   Observation  Risk factor modifications           Abnormal coronary CT angiography with 25 to 49% stenosis of the left   anterior descending coronary artery          The following portions of the patient's history were reviewed and updated as appropriate: allergies, current medications, past family history, past medical history, past social history, past surgical history and problem list.    Allergies   Allergen Reactions   • Aspirin GI Intolerance     Nausea and vomitting   • Latex Rash     Blisters with tape and intolerance with gloves       Current Outpatient Medications:   •  albuterol sulfate  (90 Base) MCG/ACT inhaler, Inhale 2 puffs Every 6 (Six) Hours As Needed., Disp: , Rfl:   •  amLODIPine (NORVASC) 10 MG tablet, Take 1 tablet by mouth Daily., Disp: 90 tablet, Rfl: 4  •  Blood Pressure Monitoring (SPHYGMOMANOMETER) misc, 1 Units 2 (Two) Times a Day., Disp: 1 each, Rfl: 0  •  bumetanide (BUMEX) 0.5 MG tablet, Take 1 tablet by mouth Daily., Disp: 90 tablet, Rfl: 3  •  citalopram (CeleXA) 20 MG tablet, Take 40 mg by mouth Daily., Disp: , Rfl:   •  cloNIDine (CATAPRES) 0.1 MG tablet, Take 1 tablet by mouth 2 (Two) Times a Day., Disp: 180 tablet, Rfl: 3  •  cloNIDine (CATAPRES-TTS) 0.3 MG/24HR patch, Place 1 patch on the skin as directed by provider 1 (One) Time Per Week., Disp: 12 patch, Rfl: 3  •  doxepin (SINEquan) 25 MG capsule, 1 CAPSULE BY MOUTH AT BEDTIME FILL ON OR AFTER 1/4/2018, Disp: , Rfl: 1  •  gabapentin (NEURONTIN) 300 MG capsule, Take 300 mg by mouth 2 (Two) Times a Day., Disp: , Rfl:   •  gabapentin (NEURONTIN) 600 MG tablet, Take 900 mg by mouth Every Night., Disp: , Rfl:   •  HYDROcodone-acetaminophen (NORCO)  MG per tablet, Take 1 tablet by mouth Every 6 (Six) Hours., Disp: , Rfl:   •  hydrOXYzine pamoate (VISTARIL) 25 MG capsule, Take 25 mg by mouth Every 8 (Eight) Hours As Needed., Disp: , Rfl:   •  levETIRAcetam (KEPPRA) 500 MG tablet, Take 500 mg by mouth 2 (Two) Times a Day., Disp: , Rfl:   •  lisinopril (PRINIVIL,ZESTRIL) 40 MG tablet, TAKE 1 TABLET BY MOUTH TWICE A DAY, Disp: 60 tablet, Rfl: 7  •  nitroglycerin (NITROSTAT) 0.4  MG SL tablet, Take no more than 3 doses in 15 minutes., Disp: 30 tablet, Rfl: 8  •  SUMAtriptan (IMITREX) 50 MG tablet, , Disp: , Rfl:   •  vitamin D (ERGOCALCIFEROL) 48057 units capsule capsule, Take 50,000 Units by mouth Every 7 (Seven) Days., Disp: , Rfl:   •  carvedilol (COREG) 25 MG tablet, Take 1 tablet by mouth 2 (Two) Times a Day., Disp: 180 tablet, Rfl: 3  Past Medical History:   Diagnosis Date   • Arthritis    • Cancer (CMS/HCC)     CREVICAL, UTERINE   • COPD (chronic obstructive pulmonary disease) (CMS/HCC)    • Fatty liver    • GERD (gastroesophageal reflux disease)    • History of transfusion    • Hypertension    • Kidney stone    • Low back pain potentially associated with spinal stenosis    • Lupus (CMS/HCC)    • Migraines    • Scoliosis    • Shortness of breath    • UTI (urinary tract infection)      Past Surgical History:   Procedure Laterality Date   • BACK SURGERY      lumbar   • CARDIAC CATHETERIZATION     • CARDIAC CATHETERIZATION N/A 2020    Procedure: Cardiac Catheterization/Vascular Study Radial cath;  Surgeon: Robert Padilla MD;  Location: [facility] CATH INVASIVE LOCATION;  Service: Cardiology;  Laterality: N/A;   • CARDIAC DEFIBRILLATOR PLACEMENT     •  SECTION     • CHOLECYSTECTOMY     • COLON RESECTION       or    • COLONOSCOPY     • COLONOSCOPY N/A 5/10/2018    Procedure: COLONOSCOPY WITH ANESTHESIA;  Surgeon: Shawna Valladares MD;  Location: [facility] ENDOSCOPY;  Service: Gastroenterology   • ENDOSCOPY N/A 5/10/2018    Procedure: ESOPHAGOGASTRODUODENOSCOPY WITH ANESTHESIA;  Surgeon: Shawna Valladares MD;  Location: [facility] ENDOSCOPY;  Service: Gastroenterology   • EYE SURGERY      x 2   • HYSTERECTOMY     • LEG SURGERY Right     bars/bolts placed   • UPPER GASTROINTESTINAL ENDOSCOPY  1993   • URETEROSCOPY LASER LITHOTRIPSY WITH STENT INSERTION Right 2018    Procedure: URETEROSCOPY  WITH STENT INSERTION RETROGRADE PYELOGRAM;  Surgeon: Tyrell Hardin MD;  Location:  " [facility] OR;  Service: Urology     Family History   Problem Relation Age of Onset   • Cancer Mother    • Heart disease Mother    • Heart disease Father    • Lupus Sister    • Heart disease Sister    • Heart disease Sister    • No Known Problems Sister    • Hypokalemia Sister    • Alcohol abuse Sister    • Colon cancer Neg Hx    • Colon polyps Neg Hx      Social History     Socioeconomic History   • Marital status:      Spouse name: Not on file   • Number of children: Not on file   • Years of education: Not on file   • Highest education level: Not on file   Tobacco Use   • Smoking status: Current Every Day Smoker     Packs/day: 0.25     Years: 40.00     Pack years: 10.00     Types: Cigarettes   • Smokeless tobacco: Never Used   Substance and Sexual Activity   • Alcohol use: No   • Drug use: No   • Sexual activity: Defer       Review of Systems   Constitution: Positive for malaise/fatigue. Negative for chills, diaphoresis, weight gain and weight loss.   Eyes: Negative for blurred vision.   Cardiovascular: Negative for chest pain, claudication, cyanosis, dyspnea on exertion, irregular heartbeat, leg swelling, near-syncope, orthopnea, palpitations, paroxysmal nocturnal dyspnea and syncope.   Respiratory: Negative for shortness of breath.    Hematologic/Lymphatic: Negative for bleeding problem. Does not bruise/bleed easily.   Skin: Negative for dry skin and rash.   Musculoskeletal: Negative for myalgias, neck pain and stiffness.   Gastrointestinal: Negative for bloating, abdominal pain, heartburn, nausea and vomiting.   Neurological: Negative for focal weakness, headaches, light-headedness, numbness and weakness.   Psychiatric/Behavioral: The patient has insomnia.               Objective:     Physical Exam      Procedures  Ht 160 cm (63\")   BMI 30.11 kg/m²   Lab Review:   Lab Results   Component Value Date    WBC 8.80 06/19/2020    HGB 16.6 (H) 06/19/2020    HCT 51.4 (H) 06/19/2020    MCV 88.9 06/19/2020    PLT " 245 06/19/2020     Lab Results   Component Value Date    GLUCOSE 90 06/19/2020    BUN 16 06/19/2020    CREATININE 0.58 06/19/2020    EGFRIFNONA 106 06/19/2020    BCR 27.6 (H) 06/19/2020    K 4.3 06/19/2020    CO2 26.0 06/19/2020    CALCIUM 9.8 06/19/2020    ALBUMIN 4.50 06/19/2020    AST 15 06/19/2020    ALT 15 06/19/2020     Lab Results   Component Value Date    CHOL 186 10/26/2017    CHLPL 232 (H) 04/19/2016     Lab Results   Component Value Date    TRIG 185 (H) 10/26/2017    TRIG 267 (H) 04/19/2016     Lab Results   Component Value Date    HDL 55 10/26/2017    HDL 57 04/19/2016     Lab Results   Component Value Date     (H) 10/26/2017     (H) 04/19/2016       I have reviewed the most recent lab results.       Assessment:          Diagnosis Plan   1. Nonobstructive atherosclerosis of coronary artery  CTA coronary arteries 5/18/2020 - calcium score 253.57  Cath 6/19/2020 - nonobstructive disease   2. Essential hypertension  BP remains elevated. Stop atenolol. Start carvedilol 25 mg twice daily.  Monitor and record daily blood pressure. Report readings consistently higher than 140/90 or consistently lower than 100/60.    3. Mixed hyperlipidemia  Not currently on statin.  Last lipid panel 10/2017.  Recommend repeat lipid panel.   4. Enlargement of abdominal aorta (CMS/HCC) small infra renal  US aorta 8/14/2020 - no evidence of AAA   5. LVH (left ventricular hypertrophy) mild     6. Gastroesophageal reflux disease without esophagitis  Managed by PCP.   7. Lupus (CMS/HCC) Follows with rheumatology.    8. Tobacco abuse  Akua Aguilar  reports that she has been smoking cigarettes. She has a 10.00 pack-year smoking history. She has never used smokeless tobacco.. I have educated her on the risk of diseases from using tobacco products such as cancer, COPD and heart diease.     I advised her to quit and she is not willing to quit.    I spent 3  minutes counseling the patient.          9. Class 1  obesity due to excess calories with serious comorbidity and body mass index (BMI) of 30.0 to 30.9 in adult Patient's Body mass index is 30.11 kg/m². BMI is above normal parameters. Recommendations include: educational material.            Plan:       1. Stop atenolol. Start carvedilol 25 mg twice daily. Continue other medications as previously prescribed.  2. Report any worsening symptoms.  3. Report any signs of bleeding.  4. Continue heart healthy diet and regular exercise as tolerated.   5. Follow up with PCP for blood pressure and cholesterol management and routine lab work.  6.  Monitor and record daily blood pressure. Report readings consistently higher than 140/90 or consistently lower than 100/60.   7. Follow up in one month for in-person office visit, or sooner if needed.     This visit has been rescheduled as a phone visit to comply with patient safety concerns in accordance with CDC recommendations. Total time of discussion was 20 minutes.    You have chosen to receive care through a telephone visit. Do you consent to use a telephone visit for your medical care today? Yes

## 2020-09-28 NOTE — PATIENT INSTRUCTIONS
Obesity, Adult  Obesity is the condition of having too much total body fat. Being overweight or obese means that your weight is greater than what is considered healthy for your body size. Obesity is determined by a measurement called BMI. BMI is an estimate of body fat and is calculated from height and weight. For adults, a BMI of 30 or higher is considered obese.  Obesity can lead to other health concerns and major illnesses, including:  · Stroke.  · Coronary artery disease (CAD).  · Type 2 diabetes.  · Some types of cancer, including cancers of the colon, breast, uterus, and gallbladder.  · Osteoarthritis.  · High blood pressure (hypertension).  · High cholesterol.  · Sleep apnea.  · Gallbladder stones.  · Infertility problems.  What are the causes?  Common causes of this condition include:  · Eating daily meals that are high in calories, sugar, and fat.  · Being born with genes that may make you more likely to become obese.  · Having a medical condition that causes obesity, including:  ? Hypothyroidism.  ? Polycystic ovarian syndrome (PCOS).  ? Binge-eating disorder.  ? Cushing syndrome.  · Taking certain medicines, such as steroids, antidepressants, and seizure medicines.  · Not being physically active (sedentary lifestyle).  · Not getting enough sleep.  · Drinking high amounts of sugar-sweetened beverages, such as soft drinks.  What increases the risk?  The following factors may make you more likely to develop this condition:  · Having a family history of obesity.  · Being a woman of  descent.  · Being a man of  descent.  · Living in an area with limited access to:  ? Pepper, recreation centers, or sidewalks.  ? Healthy food choices, such as grocery stores and farmers' markets.  What are the signs or symptoms?  The main sign of this condition is having too much body fat.  How is this diagnosed?  This condition is diagnosed based on:  · Your BMI. If you are an adult with a BMI of 30 or  higher, you are considered obese.  · Your waist circumference. This measures the distance around your waistline.  · Your skinfold thickness. Your health care provider may gently pinch a fold of your skin and measure it.  You may have other tests to check for underlying conditions.  How is this treated?  Treatment for this condition often includes changing your lifestyle. Treatment may include some or all of the following:  · Dietary changes. This may include developing a healthy meal plan.  · Regular physical activity. This may include activity that causes your heart to beat faster (aerobic exercise) and strength training. Work with your health care provider to design an exercise program that works for you.  · Medicine to help you lose weight if you are unable to lose 1 pound a week after 6 weeks of healthy eating and more physical activity.  · Treating conditions that cause the obesity (underlying conditions).  · Surgery. Surgical options may include gastric banding and gastric bypass. Surgery may be done if:  ? Other treatments have not helped to improve your condition.  ? You have a BMI of 40 or higher.  ? You have life-threatening health problems related to obesity.  Follow these instructions at home:  Eating and drinking    · Follow recommendations from your health care provider about what you eat and drink. Your health care provider may advise you to:  ? Limit fast food, sweets, and processed snack foods.  ? Choose low-fat options, such as low-fat milk instead of whole milk.  ? Eat 5 or more servings of fruits or vegetables every day.  ? Eat at home more often. This gives you more control over what you eat.  ? Choose healthy foods when you eat out.  ? Learn to read food labels. This will help you understand how much food is considered 1 serving.  ? Learn what a healthy serving size is.  ? Keep low-fat snacks available.  ? Limit sugary drinks, such as soda, fruit juice, sweetened iced tea, and flavored  milk.  · Drink enough water to keep your urine pale yellow.  · Do not follow a fad diet. Fad diets can be unhealthy and even dangerous.  Physical activity  · Exercise regularly, as told by your health care provider.  ? Most adults should get up to 150 minutes of moderate-intensity exercise every week.  ? Ask your health care provider what types of exercise are safe for you and how often you should exercise.  · Warm up and stretch before being active.  · Cool down and stretch after being active.  · Rest between periods of activity.  Lifestyle  · Work with your health care provider and a dietitian to set a weight-loss goal that is healthy and reasonable for you.  · Limit your screen time.  · Find ways to reward yourself that do not involve food.  · Do not drink alcohol if:  ? Your health care provider tells you not to drink.  ? You are pregnant, may be pregnant, or are planning to become pregnant.  · If you drink alcohol:  ? Limit how much you use to:  § 0-1 drink a day for women.  § 0-2 drinks a day for men.  ? Be aware of how much alcohol is in your drink. In the [place] one drink equals one 12 oz bottle of beer (355 mL), one 5 oz glass of wine (148 mL), or one 1½ oz glass of hard liquor (44 mL).  General instructions  · Keep a weight-loss journal to keep track of the food you eat and how much exercise you get.  · Take over-the-counter and prescription medicines only as told by your health care provider.  · Take vitamins and supplements only as told by your health care provider.  · Consider joining a support group. Your health care provider may be able to recommend a support group.  · Keep all follow-up visits as told by your health care provider. This is important.  Contact a health care provider if:  · You are unable to meet your weight loss goal after 6 weeks of dietary and lifestyle changes.  Get help right away if you are having:  · Trouble breathing.  · Suicidal thoughts or behaviors.  Summary  · Obesity is the  condition of having too much total body fat.  · Being overweight or obese means that your weight is greater than what is considered healthy for your body size.  · Work with your health care provider and a dietitian to set a weight-loss goal that is healthy and reasonable for you.  · Exercise regularly, as told by your health care provider. Ask your health care provider what types of exercise are safe for you and how often you should exercise.  This information is not intended to replace advice given to you by your health care provider. Make sure you discuss any questions you have with your health care provider.  Document Released: 01/25/2006 Document Revised: 08/22/2019 Document Reviewed: 08/22/2019  [employer] Patient Education © 2020 [employer]    Heart-Healthy Eating Plan  Many factors influence your heart (coronary) health, including eating and exercise habits. Coronary risk increases with abnormal blood fat (lipid) levels. Heart-healthy meal planning includes limiting unhealthy fats, increasing healthy fats, and making other diet and lifestyle changes.  What is my plan?  Your health care provider may recommend that you:  · Limit your fat intake to _________% or less of your total calories each day.  · Limit your saturated fat intake to _________% or less of your total calories each day.  · Limit the amount of cholesterol in your diet to less than _________ mg per day.  What are tips for following this plan?  Cooking  Cook foods using methods other than frying. Baking, boiling, grilling, and broiling are all good options. Other ways to reduce fat include:  · Removing the skin from poultry.  · Removing all visible fats from meats.  · Steaming vegetables in water or broth.  Meal planning    · At meals, imagine dividing your plate into fourths:  ? Fill one-half of your plate with vegetables and green salads.  ? Fill one-fourth of your plate with whole grains.  ? Fill one-fourth of your plate with lean protein  foods.  · Eat 4-5 servings of vegetables per day. One serving equals 1 cup raw or cooked vegetable, or 2 cups raw leafy greens.  · Eat 4-5 servings of fruit per day. One serving equals 1 medium whole fruit, ¼ cup dried fruit, ½ cup fresh, frozen, or canned fruit, or ½ cup 100% fruit juice.  · Eat more foods that contain soluble fiber. Examples include apples, broccoli, carrots, beans, peas, and barley. Aim to get 25-30 g of fiber per day.  · Increase your consumption of legumes, nuts, and seeds to 4-5 servings per week. One serving of dried beans or legumes equals ½ cup cooked, 1 serving of nuts is ¼ cup, and 1 serving of seeds equals 1 tablespoon.  Fats  · Choose healthy fats more often. Choose monounsaturated and polyunsaturated fats, such as olive and canola oils, flaxseeds, walnuts, almonds, and seeds.  · Eat more omega-3 fats. Choose salmon, mackerel, sardines, tuna, flaxseed oil, and ground flaxseeds. Aim to eat fish at least 2 times each week.  · Check food labels carefully to identify foods with trans fats or high amounts of saturated fat.  · Limit saturated fats. These are found in animal products, such as meats, butter, and cream. Plant sources of saturated fats include palm oil, palm kernel oil, and coconut oil.  · Avoid foods with partially hydrogenated oils in them. These contain trans fats. Examples are stick margarine, some tub margarines, cookies, crackers, and other baked goods.  · Avoid fried foods.  General information  · Eat more home-cooked food and less restaurant, buffet, and fast food.  · Limit or avoid alcohol.  · Limit foods that are high in starch and sugar.  · Lose weight if you are overweight. Losing just 5-10% of your body weight can help your overall health and prevent diseases such as diabetes and heart disease.  · Monitor your salt (sodium) intake, especially if you have high blood pressure. Talk with your health care provider about your sodium intake.  · Try to incorporate more  vegetarian meals weekly.  What foods can I eat?  Fruits  All fresh, canned (in natural juice), or frozen fruits.  Vegetables  Fresh or frozen vegetables (raw, steamed, roasted, or grilled). Green salads.  Grains  Most grains. Choose whole wheat and whole grains most of the time. Rice and pasta, including brown rice and pastas made with whole wheat.  Meats and other proteins  Lean, well-trimmed beef, veal, pork, and lamb. Chicken and turkey without skin. All fish and shellfish. Wild duck, rabbit, pheasant, and venison. Egg whites or low-cholesterol egg substitutes. Dried beans, peas, lentils, and tofu. Seeds and most nuts.  Dairy  Low-fat or nonfat cheeses, including ricotta and mozzarella. Skim or 1% milk (liquid, powdered, or evaporated). Buttermilk made with low-fat milk. Nonfat or low-fat yogurt.  Fats and oils  Non-hydrogenated (trans-free) margarines. Vegetable oils, including soybean, sesame, sunflower, olive, peanut, safflower, corn, canola, and cottonseed. Salad dressings or mayonnaise made with a vegetable oil.  Beverages  Water (mineral or sparkling). Coffee and tea. Diet carbonated beverages.  Sweets and desserts  Sherbet, gelatin, and fruit ice. Small amounts of dark chocolate.  Limit all sweets and desserts.  Seasonings and condiments  All seasonings and condiments.  The items listed above may not be a complete list of foods and beverages you can eat. Contact a dietitian for more options.  What foods are not recommended?  Fruits  Canned fruit in heavy syrup. Fruit in cream or butter sauce. Fried fruit. Limit coconut.  Vegetables  Vegetables cooked in cheese, cream, or butter sauce. Fried vegetables.  Grains  Breads made with saturated or trans fats, oils, or whole milk. Croissants. Sweet rolls. Donuts. High-fat crackers, such as cheese crackers.  Meats and other proteins  Fatty meats, such as hot dogs, ribs, sausage, segura, rib-eye roast or steak. High-fat deli meats, such as salami and bologna. Caviar.  Domestic duck and goose. Organ meats, such as liver.  Dairy  Cream, sour cream, cream cheese, and creamed cottage cheese. Whole milk cheeses. Whole or 2% milk (liquid, evaporated, or condensed). Whole buttermilk. Cream sauce or high-fat cheese sauce. Whole-milk yogurt.  Fats and oils  Meat fat, or shortening. Cocoa butter, hydrogenated oils, palm oil, coconut oil, palm kernel oil. Solid fats and shortenings, including segura fat, salt pork, lard, and butter. Nondairy cream substitutes. Salad dressings with cheese or sour cream.  Beverages  Regular sodas and any drinks with added sugar.  Sweets and desserts  Frosting. Pudding. Cookies. Cakes. Pies. Milk chocolate or white chocolate. Buttered syrups. Full-fat ice cream or ice cream drinks.  The items listed above may not be a complete list of foods and beverages to avoid. Contact a dietitian for more information.  Summary  · Heart-healthy meal planning includes limiting unhealthy fats, increasing healthy fats, and making other diet and lifestyle changes.  · Lose weight if you are overweight. Losing just 5-10% of your body weight can help your overall health and prevent diseases such as diabetes and heart disease.  · Focus on eating a balance of foods, including fruits and vegetables, low-fat or nonfat dairy, lean protein, nuts and legumes, whole grains, and heart-healthy oils and fats.  This information is not intended to replace advice given to you by your health care provider. Make sure you discuss any questions you have with your health care provider.  Document Released: 09/26/2009 Document Revised: 01/25/2019 Document Reviewed: 01/25/2019  [employer] Patient Education © 2020 [employer]    Exercising to Lose Weight  Exercise is structured, repetitive physical activity to improve fitness and health. Getting regular exercise is important for everyone. It is especially important if you are overweight. Being overweight increases your risk of heart disease, stroke,  diabetes, high blood pressure, and several types of cancer. Reducing your calorie intake and exercising can help you lose weight.  Exercise is usually categorized as moderate or vigorous intensity. To lose weight, most people need to do a certain amount of moderate-intensity or vigorous-intensity exercise each week.  Moderate-intensity exercise    Moderate-intensity exercise is any activity that gets you moving enough to burn at least three times more energy (calories) than if you were sitting.  Examples of moderate exercise include:  · Walking a mile in 15 minutes.  · Doing light yard work.  · Biking at an easy pace.  Most people should get at least 150 minutes (2 hours and 30 minutes) a week of moderate-intensity exercise to maintain their body weight.  Vigorous-intensity exercise  Vigorous-intensity exercise is any activity that gets you moving enough to burn at least six times more calories than if you were sitting. When you exercise at this intensity, you should be working hard enough that you are not able to carry on a conversation.  Examples of vigorous exercise include:  · Running.  · Playing a team sport, such as football, basketball, and soccer.  · Jumping rope.  Most people should get at least 75 minutes (1 hour and 15 minutes) a week of vigorous-intensity exercise to maintain their body weight.  How can exercise affect me?  When you exercise enough to burn more calories than you eat, you lose weight. Exercise also reduces body fat and builds muscle. The more muscle you have, the more calories you burn. Exercise also:  · Improves mood.  · Reduces stress and tension.  · Improves your overall fitness, flexibility, and endurance.  · Increases bone strength.  The amount of exercise you need to lose weight depends on:  · Your age.  · The type of exercise.  · Any health conditions you have.  · Your overall physical ability.  Talk to your health care provider about how much exercise you need and what types of  activities are safe for you.  What actions can I take to lose weight?  Nutrition    · Make changes to your diet as told by your health care provider or diet and nutrition specialist (dietitian). This may include:  ? Eating fewer calories.  ? Eating more protein.  ? Eating less unhealthy fats.  ? Eating a diet that includes fresh fruits and vegetables, whole grains, low-fat dairy products, and lean protein.  ? Avoiding foods with added fat, salt, and sugar.  · Drink plenty of water while you exercise to prevent dehydration or heat stroke.  Activity  · Choose an activity that you enjoy and set realistic goals. Your health care provider can help you make an exercise plan that works for you.  · Exercise at a moderate or vigorous intensity most days of the week.  ? The intensity of exercise may vary from person to person. You can tell how intense a workout is for you by paying attention to your breathing and heartbeat. Most people will notice their breathing and heartbeat get faster with more intense exercise.  · Do resistance training twice each week, such as:  ? Push-ups.  ? Sit-ups.  ? Lifting weights.  ? Using resistance bands.  · Getting short amounts of exercise can be just as helpful as long structured periods of exercise. If you have trouble finding time to exercise, try to include exercise in your daily routine.  ? Get up, stretch, and walk around every 30 minutes throughout the day.  ? Go for a walk during your lunch break.  ? Park your car farther away from your destination.  ? If you take public transportation, get off one stop early and walk the rest of the way.  ? Make phone calls while standing up and walking around.  ? Take the stairs instead of elevators or escalators.  · Wear comfortable clothes and shoes with good support.  · Do not exercise so much that you hurt yourself, feel dizzy, or get very short of breath.  Where to find more information  · [place] Department of Health and Human Services:  www.hhs.gov  · Centers for Disease Control and Prevention (CDC): www.cdc.gov  Contact a health care provider:  · Before starting a new exercise program.  · If you have questions or concerns about your weight.  · If you have a medical problem that keeps you from exercising.  Get help right away if you have any of the following while exercising:  · Injury.  · Dizziness.  · Difficulty breathing or shortness of breath that does not go away when you stop exercising.  · Chest pain.  · Rapid heartbeat.  Summary  · Being overweight increases your risk of heart disease, stroke, diabetes, high blood pressure, and several types of cancer.  · Losing weight happens when you burn more calories than you eat.  · Reducing the amount of calories you eat in addition to getting regular moderate or vigorous exercise each week helps you lose weight.  This information is not intended to replace advice given to you by your health care provider. Make sure you discuss any questions you have with your health care provider.  Document Released: 01/20/2012 Document Revised: 12/31/2018 Document Reviewed: 12/31/2018  [employer] Patient Education © 2020 [employer]    Health Risks of Smoking  Smoking cigarettes is very bad for your health. Tobacco smoke has over 200 known poisons in it. It contains the poisonous gases nitrogen oxide and carbon monoxide. There are over 60 chemicals in tobacco smoke that cause cancer.  Smoking is difficult to quit because a chemical in tobacco, called nicotine, causes addiction or dependence. When you smoke and inhale, nicotine is absorbed rapidly into the bloodstream through your lungs. Both inhaled and non-inhaled nicotine may be addictive.  What are the risks of cigarette smoke?  Cigarette smokers have an increased risk of many serious medical problems, including:  · Lung cancer.  · Lung disease, such as pneumonia, bronchitis, and emphysema.  · Chest pain (angina) and heart attack because the heart is not getting  enough oxygen.  · Heart disease and peripheral blood vessel disease.  · High blood pressure (hypertension).  · Stroke.  · Oral cancer, including cancer of the lip, mouth, or voice box.  · Bladder cancer.  · Pancreatic cancer.  · Cervical cancer.  · Pregnancy complications, including premature birth.  · Stillbirths and smaller  babies, birth defects, and genetic damage to sperm.  · Early menopause.  · Lower estrogen level for women.  · Infertility.  · Facial wrinkles.  · Blindness.  · Increased risk of broken bones (fractures).  · Senile dementia.  · Stomach ulcers and internal bleeding.  · Delayed wound healing and increased risk of complications during surgery.  · Even smoking lightly shortens your life expectancy by several years.  Because of secondhand smoke exposure, children of smokers have an increased risk of the following:  · Sudden infant death syndrome (SIDS).  · Respiratory infections.  · Lung cancer.  · Heart disease.  · Ear infections.  What are the benefits of quitting?  There are many health benefits of quitting smoking. Here are some of them:  · Within days of quitting smoking, your risk of having a heart attack decreases, your blood flow improves, and your lung capacity improves. Blood pressure, pulse rate, and breathing patterns start returning to normal soon after quitting.  · Within months, your lungs may clear up completely.  · Quitting for 10 years reduces your risk of developing lung cancer and heart disease to almost that of a nonsmoker.  · People who quit may see an improvement in their overall quality of life.  How do I quit smoking?         Smoking is an addiction with both physical and psychological effects, and longtime habits can be hard to change. Your health care provider can recommend:  · Programs and community resources, which may include group support, education, or talk therapy.  · Prescription medicines to help reduce cravings.  · Nicotine replacement products, such as  patches, gum, and nasal sprays. Use these products only as directed. Do not replace cigarette smoking with electronic cigarettes, which are commonly called e-cigarettes. The safety of e-cigarettes is not known, and some may contain harmful chemicals.  · A combination of two or more of these methods.  Where to find more information  · American Lung Association: www.lung.org  · American Cancer Society: www.cancer.org  Summary  · Smoking cigarettes is very bad for your health. Cigarette smokers have an increased risk of many serious medical problems, including several cancers, heart disease, and stroke.  · Smoking is an addiction with both physical and psychological effects, and longtime habits can be hard to change.  · By stopping right away, you can greatly reduce the risk of medical problems for you and your family.  · To help you quit smoking, your health care provider can recommend programs, community resources, prescription medicines, and nicotine replacement products such as patches, gum, and nasal sprays.  This information is not intended to replace advice given to you by your health care provider. Make sure you discuss any questions you have with your health care provider.  Document Released: 01/25/2006 Document Revised: 03/21/2019 Document Reviewed: 12/22/2017  [employer] Patient Education © 2020 [employer]    Steps to Quit Smoking  Smoking tobacco is the leading cause of preventable death. It can affect almost every organ in the body. Smoking puts you and people around you at risk for many serious, long-lasting (chronic) diseases. Quitting smoking can be hard, but it is one of the best things that you can do for your health. It is never too late to quit.  How do I get ready to quit?  When you decide to quit smoking, make a plan to help you succeed. Before you quit:  · Pick a date to quit. Set a date within the next 2 weeks to give you time to prepare.  · Write down the reasons why you are quitting. Keep  this list in places where you will see it often.  · Tell your family, friends, and co-workers that you are quitting. Their support is important.  · Talk with your doctor about the choices that may help you quit.  · Find out if your health insurance will pay for these treatments.  · Know the people, places, things, and activities that make you want to smoke (triggers). Avoid them.  What first steps can I take to quit smoking?  · Throw away all cigarettes at home, at work, and in your car.  · Throw away the things that you use when you smoke, such as ashtrays and lighters.  · Clean your car. Make sure to empty the ashtray.  · Clean your home, including curtains and carpets.  What can I do to help me quit smoking?  Talk with your doctor about taking medicines and seeing a counselor at the same time. You are more likely to succeed when you do both.  · If you are pregnant or breastfeeding, talk with your doctor about counseling or other ways to quit smoking. Do not take medicine to help you quit smoking unless your doctor tells you to do so.  To quit smoking:  Quit right away  · Quit smoking totally, instead of slowly cutting back on how much you smoke over a period of time.  · Go to counseling. You are more likely to quit if you go to counseling sessions regularly.  Take medicine  You may take medicines to help you quit. Some medicines need a prescription, and some you can buy over-the-counter. Some medicines may contain a drug called nicotine to replace the nicotine in cigarettes. Medicines may:  · Help you to stop having the desire to smoke (cravings).  · Help to stop the problems that come when you stop smoking (withdrawal symptoms).  Your doctor may ask you to use:  · Nicotine patches, gum, or lozenges.  · Nicotine inhalers or sprays.  · Non-nicotine medicine that is taken by mouth.  Find resources  Find resources and other ways to help you quit smoking and remain smoke-free after you quit. These resources are most  helpful when you use them often. They include:  · Online chats with a counselor.  · Phone quitlines.  · Printed self-help materials.  · Support groups or group counseling.  · Text messaging programs.  · Mobile phone apps. Use apps on your mobile phone or tablet that can help you stick to your quit plan. There are many free apps for mobile phones and tablets as well as websites. Examples include Quit Guide from the CDC and smokefree.gov    What things can I do to make it easier to quit?    · Talk to your family and friends. Ask them to support and encourage you.  · Call a phone quitline (1-800-QUIT-NOW), reach out to support groups, or work with a counselor.  · Ask people who smoke to not smoke around you.  · Avoid places that make you want to smoke, such as:  ? Bars.  ? Parties.  ? Smoke-break areas at work.  · Spend time with people who do not smoke.  · Lower the stress in your life. Stress can make you want to smoke. Try these things to help your stress:  ? Getting regular exercise.  ? Doing deep-breathing exercises.  ? Doing yoga.  ? Meditating.  ? Doing a body scan. To do this, close your eyes, focus on one area of your body at a time from head to toe. Notice which parts of your body are tense. Try to relax the muscles in those areas.  How will I feel when I quit smoking?  Day 1 to 3 weeks  Within the first 24 hours, you may start to have some problems that come from quitting tobacco. These problems are very bad 2-3 days after you quit, but they do not often last for more than 2-3 weeks. You may get these symptoms:  · Mood swings.  · Feeling restless, nervous, angry, or annoyed.  · Trouble concentrating.  · Dizziness.  · Strong desire for high-sugar foods and nicotine.  · Weight gain.  · Trouble pooping (constipation).  · Feeling like you may vomit (nausea).  · Coughing or a sore throat.  · Changes in how the medicines that you take for other issues work in your body.  · Depression.  · Trouble sleeping  (insomnia).  Week 3 and afterward  After the first 2-3 weeks of quitting, you may start to notice more positive results, such as:  · Better sense of smell and taste.  · Less coughing and sore throat.  · Slower heart rate.  · Lower blood pressure.  · Clearer skin.  · Better breathing.  · Fewer sick days.  Quitting smoking can be hard. Do not give up if you fail the first time. Some people need to try a few times before they succeed. Do your best to stick to your quit plan, and talk with your doctor if you have any questions or concerns.  Summary  · Smoking tobacco is the leading cause of preventable death. Quitting smoking can be hard, but it is one of the best things that you can do for your health.  · When you decide to quit smoking, make a plan to help you succeed.  · Quit smoking right away, not slowly over a period of time.  · When you start quitting, seek help from your doctor, family, or friends.  This information is not intended to replace advice given to you by your health care provider. Make sure you discuss any questions you have with your health care provider.  Document Released: 10/14/2010 Document Revised: 03/06/2020 Document Reviewed: 03/07/2020  [employer] Patient Education © 2020 [employer]

## 2020-10-09 ENCOUNTER — TRANSCRIBE ORDERS (OUTPATIENT)
Dept: ADMINISTRATIVE | Facility: HOSPITAL | Age: 60
End: 2020-10-09

## 2020-10-09 DIAGNOSIS — Z01.818 PREOP TESTING: Primary | ICD-10-CM

## 2020-10-12 ENCOUNTER — LAB (OUTPATIENT)
Dept: LAB | Facility: HOSPITAL | Age: 60
End: 2020-10-12

## 2020-10-12 PROCEDURE — U0003 INFECTIOUS AGENT DETECTION BY NUCLEIC ACID (DNA OR RNA); SEVERE ACUTE RESPIRATORY SYNDROME CORONAVIRUS 2 (SARS-COV-2) (CORONAVIRUS DISEASE [COVID-19]), AMPLIFIED PROBE TECHNIQUE, MAKING USE OF HIGH THROUGHPUT TECHNOLOGIES AS DESCRIBED BY CMS-2020-01-R: HCPCS | Performed by: PAIN MEDICINE

## 2020-10-12 PROCEDURE — C9803 HOPD COVID-19 SPEC COLLECT: HCPCS | Performed by: PAIN MEDICINE

## 2020-10-13 LAB
COVID LABCORP PRIORITY: NORMAL
SARS-COV-2 RNA RESP QL NAA+PROBE: NOT DETECTED

## 2020-10-27 NOTE — PATIENT INSTRUCTIONS
Health Risks of Smoking  Smoking cigarettes is very bad for your health. Tobacco smoke has over 200 known poisons in it. It contains the poisonous gases nitrogen oxide and carbon monoxide. There are over 60 chemicals in tobacco smoke that cause cancer.  Smoking is difficult to quit because a chemical in tobacco, called nicotine, causes addiction or dependence. When you smoke and inhale, nicotine is absorbed rapidly into the bloodstream through your lungs. Both inhaled and non-inhaled nicotine may be addictive.  What are the risks of cigarette smoke?  Cigarette smokers have an increased risk of many serious medical problems, including:  · Lung cancer.  · Lung disease, such as pneumonia, bronchitis, and emphysema.  · Chest pain (angina) and heart attack because the heart is not getting enough oxygen.  · Heart disease and peripheral blood vessel disease.  · High blood pressure (hypertension).  · Stroke.  · Oral cancer, including cancer of the lip, mouth, or voice box.  · Bladder cancer.  · Pancreatic cancer.  · Cervical cancer.  · Pregnancy complications, including premature birth.  · Stillbirths and smaller  babies, birth defects, and genetic damage to sperm.  · Early menopause.  · Lower estrogen level for women.  · Infertility.  · Facial wrinkles.  · Blindness.  · Increased risk of broken bones (fractures).  · Senile dementia.  · Stomach ulcers and internal bleeding.  · Delayed wound healing and increased risk of complications during surgery.  · Even smoking lightly shortens your life expectancy by several years.  Because of secondhand smoke exposure, children of smokers have an increased risk of the following:  · Sudden infant death syndrome (SIDS).  · Respiratory infections.  · Lung cancer.  · Heart disease.  · Ear infections.  What are the benefits of quitting?  There are many health benefits of quitting smoking. Here are some of them:  · Within days of quitting smoking, your risk of having a heart attack  decreases, your blood flow improves, and your lung capacity improves. Blood pressure, pulse rate, and breathing patterns start returning to normal soon after quitting.  · Within months, your lungs may clear up completely.  · Quitting for 10 years reduces your risk of developing lung cancer and heart disease to almost that of a nonsmoker.  · People who quit may see an improvement in their overall quality of life.  How do I quit smoking?         Smoking is an addiction with both physical and psychological effects, and longtime habits can be hard to change. Your health care provider can recommend:  · Programs and community resources, which may include group support, education, or talk therapy.  · Prescription medicines to help reduce cravings.  · Nicotine replacement products, such as patches, gum, and nasal sprays. Use these products only as directed. Do not replace cigarette smoking with electronic cigarettes, which are commonly called e-cigarettes. The safety of e-cigarettes is not known, and some may contain harmful chemicals.  · A combination of two or more of these methods.  Where to find more information  · American Lung Association: www.lung.org  · American Cancer Society: www.cancer.org  Summary  · Smoking cigarettes is very bad for your health. Cigarette smokers have an increased risk of many serious medical problems, including several cancers, heart disease, and stroke.  · Smoking is an addiction with both physical and psychological effects, and longtime habits can be hard to change.  · By stopping right away, you can greatly reduce the risk of medical problems for you and your family.  · To help you quit smoking, your health care provider can recommend programs, community resources, prescription medicines, and nicotine replacement products such as patches, gum, and nasal sprays.  This information is not intended to replace advice given to you by your health care provider. Make sure you discuss any questions  you have with your health care provider.  Document Released: 01/25/2006 Document Revised: 03/21/2019 Document Reviewed: 12/22/2017  "[employer]" Patient Education © 2020 "[employer] Inc.    Steps to Quit Smoking  Smoking tobacco is the leading cause of preventable death. It can affect almost every organ in the body. Smoking puts you and people around you at risk for many serious, long-lasting (chronic) diseases. Quitting smoking can be hard, but it is one of the best things that you can do for your health. It is never too late to quit.  How do I get ready to quit?  When you decide to quit smoking, make a plan to help you succeed. Before you quit:  · Pick a date to quit. Set a date within the next 2 weeks to give you time to prepare.  · Write down the reasons why you are quitting. Keep this list in places where you will see it often.  · Tell your family, friends, and co-workers that you are quitting. Their support is important.  · Talk with your doctor about the choices that may help you quit.  · Find out if your health insurance will pay for these treatments.  · Know the people, places, things, and activities that make you want to smoke (triggers). Avoid them.  What first steps can I take to quit smoking?  · Throw away all cigarettes at home, at work, and in your car.  · Throw away the things that you use when you smoke, such as ashtrays and lighters.  · Clean your car. Make sure to empty the ashtray.  · Clean your home, including curtains and carpets.  What can I do to help me quit smoking?  Talk with your doctor about taking medicines and seeing a counselor at the same time. You are more likely to succeed when you do both.  · If you are pregnant or breastfeeding, talk with your doctor about counseling or other ways to quit smoking. Do not take medicine to help you quit smoking unless your doctor tells you to do so.  To quit smoking:  Quit right away  · Quit smoking totally, instead of slowly cutting back on how much you  smoke over a period of time.  · Go to counseling. You are more likely to quit if you go to counseling sessions regularly.  Take medicine  You may take medicines to help you quit. Some medicines need a prescription, and some you can buy over-the-counter. Some medicines may contain a drug called nicotine to replace the nicotine in cigarettes. Medicines may:  · Help you to stop having the desire to smoke (cravings).  · Help to stop the problems that come when you stop smoking (withdrawal symptoms).  Your doctor may ask you to use:  · Nicotine patches, gum, or lozenges.  · Nicotine inhalers or sprays.  · Non-nicotine medicine that is taken by mouth.  Find resources  Find resources and other ways to help you quit smoking and remain smoke-free after you quit. These resources are most helpful when you use them often. They include:  · Online chats with a counselor.  · Phone quitlines.  · Printed self-help materials.  · Support groups or group counseling.  · Text messaging programs.  · Mobile phone apps. Use apps on your mobile phone or tablet that can help you stick to your quit plan. There are many free apps for mobile phones and tablets as well as websites. Examples include Quit Guide from the CDC and smokefree.gov    What things can I do to make it easier to quit?    · Talk to your family and friends. Ask them to support and encourage you.  · Call a phone quitline (5-800-QUIT-NOW), reach out to support groups, or work with a counselor.  · Ask people who smoke to not smoke around you.  · Avoid places that make you want to smoke, such as:  ? Bars.  ? Parties.  ? Smoke-break areas at work.  · Spend time with people who do not smoke.  · Lower the stress in your life. Stress can make you want to smoke. Try these things to help your stress:  ? Getting regular exercise.  ? Doing deep-breathing exercises.  ? Doing yoga.  ? Meditating.  ? Doing a body scan. To do this, close your eyes, focus on one area of your body at a time  from head to toe. Notice which parts of your body are tense. Try to relax the muscles in those areas.  How will I feel when I quit smoking?  Day 1 to 3 weeks  Within the first 24 hours, you may start to have some problems that come from quitting tobacco. These problems are very bad 2-3 days after you quit, but they do not often last for more than 2-3 weeks. You may get these symptoms:  · Mood swings.  · Feeling restless, nervous, angry, or annoyed.  · Trouble concentrating.  · Dizziness.  · Strong desire for high-sugar foods and nicotine.  · Weight gain.  · Trouble pooping (constipation).  · Feeling like you may vomit (nausea).  · Coughing or a sore throat.  · Changes in how the medicines that you take for other issues work in your body.  · Depression.  · Trouble sleeping (insomnia).  Week 3 and afterward  After the first 2-3 weeks of quitting, you may start to notice more positive results, such as:  · Better sense of smell and taste.  · Less coughing and sore throat.  · Slower heart rate.  · Lower blood pressure.  · Clearer skin.  · Better breathing.  · Fewer sick days.  Quitting smoking can be hard. Do not give up if you fail the first time. Some people need to try a few times before they succeed. Do your best to stick to your quit plan, and talk with your doctor if you have any questions or concerns.  Summary  · Smoking tobacco is the leading cause of preventable death. Quitting smoking can be hard, but it is one of the best things that you can do for your health.  · When you decide to quit smoking, make a plan to help you succeed.  · Quit smoking right away, not slowly over a period of time.  · When you start quitting, seek help from your doctor, family, or friends.  This information is not intended to replace advice given to you by your health care provider. Make sure you discuss any questions you have with your health care provider.  Document Released: 10/14/2010 Document Revised: 09/11/2020 Document Reviewed:  03/07/2020  [employer] Patient Education © 2020 [employer]    Obesity, Adult  Obesity is the condition of having too much total body fat. Being overweight or obese means that your weight is greater than what is considered healthy for your body size. Obesity is determined by a measurement called BMI. BMI is an estimate of body fat and is calculated from height and weight. For adults, a BMI of 30 or higher is considered obese.  Obesity can lead to other health concerns and major illnesses, including:  · Stroke.  · Coronary artery disease (CAD).  · Type 2 diabetes.  · Some types of cancer, including cancers of the colon, breast, uterus, and gallbladder.  · Osteoarthritis.  · High blood pressure (hypertension).  · High cholesterol.  · Sleep apnea.  · Gallbladder stones.  · Infertility problems.  What are the causes?  Common causes of this condition include:  · Eating daily meals that are high in calories, sugar, and fat.  · Being born with genes that may make you more likely to become obese.  · Having a medical condition that causes obesity, including:  ? Hypothyroidism.  ? Polycystic ovarian syndrome (PCOS).  ? Binge-eating disorder.  ? Cushing syndrome.  · Taking certain medicines, such as steroids, antidepressants, and seizure medicines.  · Not being physically active (sedentary lifestyle).  · Not getting enough sleep.  · Drinking high amounts of sugar-sweetened beverages, such as soft drinks.  What increases the risk?  The following factors may make you more likely to develop this condition:  · Having a family history of obesity.  · Being a woman of  descent.  · Being a man of  descent.  · Living in an area with limited access to:  ? Pepper, recreation centers, or sidewalks.  ? Healthy food choices, such as grocery stores and [employer] markets.  What are the signs or symptoms?  The main sign of this condition is having too much body fat.  How is this diagnosed?  This condition is diagnosed  based on:  · Your BMI. If you are an adult with a BMI of 30 or higher, you are considered obese.  · Your waist circumference. This measures the distance around your waistline.  · Your skinfold thickness. Your health care provider may gently pinch a fold of your skin and measure it.  You may have other tests to check for underlying conditions.  How is this treated?  Treatment for this condition often includes changing your lifestyle. Treatment may include some or all of the following:  · Dietary changes. This may include developing a healthy meal plan.  · Regular physical activity. This may include activity that causes your heart to beat faster (aerobic exercise) and strength training. Work with your health care provider to design an exercise program that works for you.  · Medicine to help you lose weight if you are unable to lose 1 pound a week after 6 weeks of healthy eating and more physical activity.  · Treating conditions that cause the obesity (underlying conditions).  · Surgery. Surgical options may include gastric banding and gastric bypass. Surgery may be done if:  ? Other treatments have not helped to improve your condition.  ? You have a BMI of 40 or higher.  ? You have life-threatening health problems related to obesity.  Follow these instructions at home:  Eating and drinking    · Follow recommendations from your health care provider about what you eat and drink. Your health care provider may advise you to:  ? Limit fast food, sweets, and processed snack foods.  ? Choose low-fat options, such as low-fat milk instead of whole milk.  ? Eat 5 or more servings of fruits or vegetables every day.  ? Eat at home more often. This gives you more control over what you eat.  ? Choose healthy foods when you eat out.  ? Learn to read food labels. This will help you understand how much food is considered 1 serving.  ? Learn what a healthy serving size is.  ? Keep low-fat snacks available.  ? Limit sugary drinks, such  as soda, fruit juice, sweetened iced tea, and flavored milk.  · Drink enough water to keep your urine pale yellow.  · Do not follow a fad diet. Fad diets can be unhealthy and even dangerous.  Physical activity  · Exercise regularly, as told by your health care provider.  ? Most adults should get up to 150 minutes of moderate-intensity exercise every week.  ? Ask your health care provider what types of exercise are safe for you and how often you should exercise.  · Warm up and stretch before being active.  · Cool down and stretch after being active.  · Rest between periods of activity.  Lifestyle  · Work with your health care provider and a dietitian to set a weight-loss goal that is healthy and reasonable for you.  · Limit your screen time.  · Find ways to reward yourself that do not involve food.  · Do not drink alcohol if:  ? Your health care provider tells you not to drink.  ? You are pregnant, may be pregnant, or are planning to become pregnant.  · If you drink alcohol:  ? Limit how much you use to:  § 0-1 drink a day for women.  § 0-2 drinks a day for men.  ? Be aware of how much alcohol is in your drink. In the [place] one drink equals one 12 oz bottle of beer (355 mL), one 5 oz glass of wine (148 mL), or one 1½ oz glass of hard liquor (44 mL).  General instructions  · Keep a weight-loss journal to keep track of the food you eat and how much exercise you get.  · Take over-the-counter and prescription medicines only as told by your health care provider.  · Take vitamins and supplements only as told by your health care provider.  · Consider joining a support group. Your health care provider may be able to recommend a support group.  · Keep all follow-up visits as told by your health care provider. This is important.  Contact a health care provider if:  · You are unable to meet your weight loss goal after 6 weeks of dietary and lifestyle changes.  Get help right away if you are having:  · Trouble  breathing.  · Suicidal thoughts or behaviors.  Summary  · Obesity is the condition of having too much total body fat.  · Being overweight or obese means that your weight is greater than what is considered healthy for your body size.  · Work with your health care provider and a dietitian to set a weight-loss goal that is healthy and reasonable for you.  · Exercise regularly, as told by your health care provider. Ask your health care provider what types of exercise are safe for you and how often you should exercise.  This information is not intended to replace advice given to you by your health care provider. Make sure you discuss any questions you have with your health care provider.  Document Released: 01/25/2006 Document Revised: 08/22/2019 Document Reviewed: 08/22/2019  [employer] Patient Education © 2020 [employer]    Heart-Healthy Eating Plan  Heart-healthy meal planning includes:  · Eating less unhealthy fats.  · Eating more healthy fats.  · Making other changes in your diet.  Talk with your doctor or a diet specialist (dietitian) to create an eating plan that is right for you.  What is my plan?  Your doctor may recommend an eating plan that includes:  · Total fat: ______% or less of total calories a day.  · Saturated fat: ______% or less of total calories a day.  · Cholesterol: less than _________mg a day.  What are tips for following this plan?  Cooking  Avoid frying your food. Try to bake, boil, grill, or broil it instead. You can also reduce fat by:  · Removing the skin from poultry.  · Removing all visible fats from meats.  · Steaming vegetables in water or broth.  Meal planning    · At meals, divide your plate into four equal parts:  ? Fill one-half of your plate with vegetables and green salads.  ? Fill one-fourth of your plate with whole grains.  ? Fill one-fourth of your plate with lean protein foods.  · Eat 4-5 servings of vegetables per day. A serving of vegetables is:  ? 1 cup of raw or cooked  vegetables.  ? 2 cups of raw leafy greens.  · Eat 4-5 servings of fruit per day. A serving of fruit is:  ? 1 medium whole fruit.  ? ¼ cup of dried fruit.  ? ½ cup of fresh, frozen, or canned fruit.  ? ½ cup of 100% fruit juice.  · Eat more foods that have soluble fiber. These are apples, broccoli, carrots, beans, peas, and barley. Try to get 20-30 g of fiber per day.  · Eat 4-5 servings of nuts, legumes, and seeds per week:  ? 1 serving of dried beans or legumes equals ½ cup after being cooked.  ? 1 serving of nuts is ¼ cup.  ? 1 serving of seeds equals 1 tablespoon.  General information  · Eat more home-cooked food. Eat less restaurant, buffet, and fast food.  · Limit or avoid alcohol.  · Limit foods that are high in starch and sugar.  · Avoid fried foods.  · Lose weight if you are overweight.  · Keep track of how much salt (sodium) you eat. This is important if you have high blood pressure. Ask your doctor to tell you more about this.  · Try to add vegetarian meals each week.  Fats  · Choose healthy fats. These include olive oil and canola oil, flaxseeds, walnuts, almonds, and seeds.  · Eat more omega-3 fats. These include salmon, mackerel, sardines, tuna, flaxseed oil, and ground flaxseeds. Try to eat fish at least 2 times each week.  · Check food labels. Avoid foods with trans fats or high amounts of saturated fat.  · Limit saturated fats.  ? These are often found in animal products, such as meats, butter, and cream.  ? These are also found in plant foods, such as palm oil, palm kernel oil, and coconut oil.  · Avoid foods with partially hydrogenated oils in them. These have trans fats. Examples are stick margarine, some tub margarines, cookies, crackers, and other baked goods.  What foods can I eat?  Fruits  All fresh, canned (in natural juice), or frozen fruits.  Vegetables  Fresh or frozen vegetables (raw, steamed, roasted, or grilled). Green salads.  Grains  Most grains. Choose whole wheat and whole grains  most of the time. Rice and pasta, including brown rice and pastas made with whole wheat.  Meats and other proteins  Lean, well-trimmed beef, veal, pork, and lamb. Chicken and turkey without skin. All fish and shellfish. Wild duck, rabbit, pheasant, and venison. Egg whites or low-cholesterol egg substitutes. Dried beans, peas, lentils, and tofu. Seeds and most nuts.  Dairy  Low-fat or nonfat cheeses, including ricotta and mozzarella. Skim or 1% milk that is liquid, powdered, or evaporated. Buttermilk that is made with low-fat milk. Nonfat or low-fat yogurt.  Fats and oils  Non-hydrogenated (trans-free) margarines. Vegetable oils, including soybean, sesame, sunflower, olive, peanut, safflower, corn, canola, and cottonseed. Salad dressings or mayonnaise made with a vegetable oil.  Beverages  Mineral water. Coffee and tea. Diet carbonated beverages.  Sweets and desserts  Sherbet, gelatin, and fruit ice. Small amounts of dark chocolate.  Limit all sweets and desserts.  Seasonings and condiments  All seasonings and condiments.  The items listed above may not be a complete list of foods and drinks you can eat. Contact a dietitian for more options.  What foods should I avoid?  Fruits  Canned fruit in heavy syrup. Fruit in cream or butter sauce. Fried fruit. Limit coconut.  Vegetables  Vegetables cooked in cheese, cream, or butter sauce. Fried vegetables.  Grains  Breads that are made with saturated or trans fats, oils, or whole milk. Croissants. Sweet rolls. Donuts. High-fat crackers, such as cheese crackers.  Meats and other proteins  Fatty meats, such as hot dogs, ribs, sausage, segura, rib-eye roast or steak. High-fat deli meats, such as salami and bologna. Caviar. Domestic duck and goose. Organ meats, such as liver.  Dairy  Cream, sour cream, cream cheese, and creamed cottage cheese. Whole-milk cheeses. Whole or 2% milk that is liquid, evaporated, or condensed. Whole buttermilk. Cream sauce or high-fat cheese sauce.  Yogurt that is made from whole milk.  Fats and oils  Meat fat, or shortening. Cocoa butter, hydrogenated oils, palm oil, coconut oil, palm kernel oil. Solid fats and shortenings, including segura fat, salt pork, lard, and butter. Nondairy cream substitutes. Salad dressings with cheese or sour cream.  Beverages  Regular sodas and juice drinks with added sugar.  Sweets and desserts  Frosting. Pudding. Cookies. Cakes. Pies. Milk chocolate or white chocolate. Buttered syrups. Full-fat ice cream or ice cream drinks.  The items listed above may not be a complete list of foods and drinks to avoid. Contact a dietitian for more information.  Summary  · Heart-healthy meal planning includes eating less unhealthy fats, eating more healthy fats, and making other changes in your diet.  · Eat a balanced diet. This includes fruits and vegetables, low-fat or nonfat dairy, lean protein, nuts and legumes, whole grains, and heart-healthy oils and fats.  This information is not intended to replace advice given to you by your health care provider. Make sure you discuss any questions you have with your health care provider.  Document Released: 06/18/2013 Document Revised: 02/21/2019 Document Reviewed: 01/25/2019  [employer] Patient Education © 2020 [employer]    Exercising to Lose Weight  Exercise is structured, repetitive physical activity to improve fitness and health. Getting regular exercise is important for everyone. It is especially important if you are overweight. Being overweight increases your risk of heart disease, stroke, diabetes, high blood pressure, and several types of cancer. Reducing your calorie intake and exercising can help you lose weight.  Exercise is usually categorized as moderate or vigorous intensity. To lose weight, most people need to do a certain amount of moderate-intensity or vigorous-intensity exercise each week.  Moderate-intensity exercise    Moderate-intensity exercise is any activity that gets you  moving enough to burn at least three times more energy (calories) than if you were sitting.  Examples of moderate exercise include:  · Walking a mile in 15 minutes.  · Doing light yard work.  · Biking at an easy pace.  Most people should get at least 150 minutes (2 hours and 30 minutes) a week of moderate-intensity exercise to maintain their body weight.  Vigorous-intensity exercise  Vigorous-intensity exercise is any activity that gets you moving enough to burn at least six times more calories than if you were sitting. When you exercise at this intensity, you should be working hard enough that you are not able to carry on a conversation.  Examples of vigorous exercise include:  · Running.  · Playing a team sport, such as football, basketball, and soccer.  · Jumping rope.  Most people should get at least 75 minutes (1 hour and 15 minutes) a week of vigorous-intensity exercise to maintain their body weight.  How can exercise affect me?  When you exercise enough to burn more calories than you eat, you lose weight. Exercise also reduces body fat and builds muscle. The more muscle you have, the more calories you burn. Exercise also:  · Improves mood.  · Reduces stress and tension.  · Improves your overall fitness, flexibility, and endurance.  · Increases bone strength.  The amount of exercise you need to lose weight depends on:  · Your age.  · The type of exercise.  · Any health conditions you have.  · Your overall physical ability.  Talk to your health care provider about how much exercise you need and what types of activities are safe for you.  What actions can I take to lose weight?  Nutrition    · Make changes to your diet as told by your health care provider or diet and nutrition specialist (dietitian). This may include:  ? Eating fewer calories.  ? Eating more protein.  ? Eating less unhealthy fats.  ? Eating a diet that includes fresh fruits and vegetables, whole grains, low-fat dairy products, and lean  protein.  ? Avoiding foods with added fat, salt, and sugar.  · Drink plenty of water while you exercise to prevent dehydration or heat stroke.  Activity  · Choose an activity that you enjoy and set realistic goals. Your health care provider can help you make an exercise plan that works for you.  · Exercise at a moderate or vigorous intensity most days of the week.  ? The intensity of exercise may vary from person to person. You can tell how intense a workout is for you by paying attention to your breathing and heartbeat. Most people will notice their breathing and heartbeat get faster with more intense exercise.  · Do resistance training twice each week, such as:  ? Push-ups.  ? Sit-ups.  ? Lifting weights.  ? Using resistance bands.  · Getting short amounts of exercise can be just as helpful as long structured periods of exercise. If you have trouble finding time to exercise, try to include exercise in your daily routine.  ? Get up, stretch, and walk around every 30 minutes throughout the day.  ? Go for a walk during your lunch break.  ? Park your car farther away from your destination.  ? If you take public transportation, get off one stop early and walk the rest of the way.  ? Make phone calls while standing up and walking around.  ? Take the stairs instead of elevators or escalators.  · Wear comfortable clothes and shoes with good support.  · Do not exercise so much that you hurt yourself, feel dizzy, or get very short of breath.  Where to find more information  · [place] Department of Health and Human Services: www.hhs.gov  · Centers for Disease Control and Prevention (CDC): www.cdc.gov  Contact a health care provider:  · Before starting a new exercise program.  · If you have questions or concerns about your weight.  · If you have a medical problem that keeps you from exercising.  Get help right away if you have any of the following while exercising:  · Injury.  · Dizziness.  · Difficulty breathing or shortness of  breath that does not go away when you stop exercising.  · Chest pain.  · Rapid heartbeat.  Summary  · Being overweight increases your risk of heart disease, stroke, diabetes, high blood pressure, and several types of cancer.  · Losing weight happens when you burn more calories than you eat.  · Reducing the amount of calories you eat in addition to getting regular moderate or vigorous exercise each week helps you lose weight.  This information is not intended to replace advice given to you by your health care provider. Make sure you discuss any questions you have with your health care provider.  Document Released: 01/20/2012 Document Revised: 12/31/2018 Document Reviewed: 12/31/2018  [employer] Patient Education © 2020 [employer]

## 2020-10-27 NOTE — PROGRESS NOTES
Subjective:     Encounter Date:10/28/2020      Patient ID: Akua Aguilar is a 60 y.o. female   HPI: Her visit today is for routine follow up of medication adjustments. At LOV, via telemedicine, atenolol was transitioned to carvedilol 25 mg twice daily. She reports improvement in high blood pressures, however blood pressures are still high at times. She complains of insomnia and increased fatigue over the last 2 months. Patient has a history of nonobstructive coronary artery disease, hypertension, hyperlipidemia, left ventricular hypertrophy, enlargement of abdominal aorta, GERD, and smoking. US of aorta 8/14/2020 at [facility] revealed no evidence of AAA. Patient denies chest pain, shortness of breath, dizziness, syncope, orthopnea, PND, edema, and decreased stamina. Patient denies signs of bleeding.    Chief Complaint: routine follow up  Hypertension  This is a chronic problem. The current episode started more than 1 year ago. The problem is uncontrolled. Associated symptoms include malaise/fatigue. Pertinent negatives include no anxiety, blurred vision, chest pain, headaches, neck pain, orthopnea, palpitations, peripheral edema, PND, shortness of breath or sweats. There are no associated agents to hypertension. Risk factors for coronary artery disease include dyslipidemia, post-menopausal state and obesity. Current antihypertension treatment includes alpha 1 blockers, diuretics, calcium channel blockers, beta blockers and ACE inhibitors. There is no history of angina, kidney disease, CAD/MI, CVA, heart failure, left ventricular hypertrophy, PVD or retinopathy. There is no history of chronic renal disease, coarctation of the aorta, hyperaldosteronism, hypercortisolism, hyperparathyroidism, a hypertension causing med, pheochromocytoma, renovascular disease, sleep apnea or a thyroid problem.   Hyperlipidemia  This is a chronic problem. The current episode started more than 1 year ago. The problem is  uncontrolled. Recent lipid tests were reviewed and are high. Exacerbating diseases include obesity. She has no history of chronic renal disease, diabetes, hypothyroidism, liver disease or nephrotic syndrome. Pertinent negatives include no chest pain, focal sensory loss, focal weakness, leg pain, myalgias or shortness of breath. She is currently on no antihyperlipidemic treatment. Risk factors for coronary artery disease include dyslipidemia, hypertension and post-menopausal.       Previous Cardiac Testing:  Results for orders placed during the hospital encounter of 08/08/19   Adult Transthoracic Echo Complete W/ Cont if Necessary Per Protocol    Narrative · Estimated EF = 60%.  · Left ventricular diastolic dysfunction.  · Left ventricular wall thickness is consistent with mild concentric   hypertrophy.  · No evidence of pulmonary hypertension is present.        Results for orders placed during the hospital encounter of 06/19/20   Cardiac Catheterization/Vascular Study    Narrative Cardiac Catheterization Operative Report    Patient was referred for cardiac catheterization .   Indications for the procedure include: Symptoms suggestive of angina with   high suspicion for significant obstructive coronary artery disease   With ongoing chest pain    Procedure performed    Coronary angiography  Left heart catheterization  Left ventriculography  Fractional flow reserve of the left anterior descending coronary artery  Supervision of the administration of moderate sedation    Fractional flow reserve of left anterior descending coronary artery using   Ikari 3.56 British guide  Usual protocol was followed.  Adenosine was used to the or hyperemia.    Guide used as mentioned above.  The guide was advanced and the ostium of the vessel was engaged.  We   zeroed the status post aortic pressure then we advanced a fractional flow   reserve wire.  This was equalized prior to crossing the lesion.  Then we   crossed the lesion.   Anticoagulation was used prior to insertion of the   wire.  After crossing the lesion adenosine was used.  This caused   hyperemia.  Fractional flow reserve was then performed and documented as   noted.  End of case the wire was removed removed angiography was performed   before and after removing the wire to document no complication and decide   results were achieved.       Procedure Details  The risks, benefits, complications, treatment options, and expected   outcomes were discussed with the patient. Plan is for moderate sedation,   and the patient agrees to proceed with the procedure.  An immediate   assessment was done prior to the administration of moderate sedation.     The patient and/or family concurred with the proposed plan, giving   informed consent. Patient was brought to the cath lab after IV hydration   was begun and oral premedication was given. The was further sedated with   Fentanyl  and Versed.    The patient was prepped and draped in the usual manner. Using the modified   Seldinger access technique, a 6f Lao sheath was placed in the right   radial  artery.   A left heart catheterization was done. Angiograms were also done.        Cardiac Catheterization Operative Report      Patient was referred for cardiac catheterization: High suspicion for   coronary artery disease    Procedure Details  The risks, benefits, complications, treatment options, and expected   outcomes were discussed with the patient. Plan is for moderate sedation,   and the patient agrees to proceed with the procedure.  An immediate   assessment was done prior to the administration of moderate sedation.    The patient and/or family concurred with the proposed plan, giving   informed consent. Patient was brought to the cath lab after IV hydration   was begun and oral premedication was given.     The skin overlying the patient's right radial artery was prepped and   draped in the usual sterile fashion.  Timeout was taken to  confirm the   correct patient and procedure.  Lidocaine was administered for local   anesthesia.  IV Versed and fentanyl were used to achieve conscious   sedation.  Modified Seldinger technique was then used to place a 6 [place]   sheath in the right radial artery    Diagnostic coronary angiography was performed with Tig coronary catheter.    Coronary angiogram were performed in [place]ian and JIMÉNEZ projection to evaluate   the coronary arterial systems.      A TR band device was used to achieve hemostasis at the end of the   procedure.  The patient tolerated the procedure well, and there were no   immediate complications.    Procedural Details: After written and informed consent was obtained, the   patient was brought to the cath lab in a fasting state.       1. Selective coronary angiography:    Left main coronary artery:  The left main coronary artery arises from the   left coronary cusp and bifurcates into the  left anterior descending   coronary artery  and left circumflex arteries.      Left anterior descending artery:  The  left anterior descending coronary   artery   arises normally from the left main coronary artery and courses in   the anterior interventricular groove and terminates at the apex.  50%   stenosis noted of the proximal left anterior descending coronary artery   with large circumferential soft atherosclerotic plaque  Normal fractional flow reserve of 0.9 or higher    Left circumflex:  The left circumflex arises form the left man artery and   supplies obtuse marginal branches.  Minor atherosclerotic plaque noted in   the mid left circumflex coronary artery which is dominant and large    Right coronary artery:  The right coronary artery  arises normally from   the right coronary cusp and is dominant for the posterior circulation.    The right coronary artery  is non-dominant and normal.    2. Left heart cath: Moderately elevated left ventricular end-diastolic   pressure of 24 mmHg.    3. LV Gram: Normal  left ventricular ejection fraction of 55% with no   regional wall motion abnormalities.    4. Interventions: None    Estimated Blood Loss:  Minimal    Specimens: None         Complications:  None; patient tolerated the procedure well.           Disposition: Cardiovascular observation unit           Condition: stable            I supervised the administration of conscious sedation by nursing staff   throughout the case.  First dose was given at 1119 and the end of my   face-to-face encounter was at 1145 Hours.    During the case, continuous pulse oximetry, heart rate, blood pressure,   and patient status were monitored.       Risk, Benefits, and Alternatives discussed with the patient and/or family.    Plan is for moderate sedation, and the patient agrees to proceed with the   procedure.  An immediate assessment was done prior to the administration   of moderate sedation      Conclusion    50% stenosis of proximal left anterior descending coronary artery with a   normal fractional flow reserve 0.9 or higher with a large circumferential   plaque burden for which aggressive medical therapy is advised  Large dominant left circumflex coronary artery with minor atherosclerotic   plaque in the midsegment without any significant lesions  Right coronary artery is nondominant small and normal  Moderately elevated left ventricular end-diastolic pressure of 24 mmHg  Normal left ventricular ejection fraction of 55% without any discrete wall   motion abnormalities and no significant mitral regurgitation        Plan    Keep LDL well below 70 mg/dL  Treat for elevated left ventricular end-diastolic pressure  Can be discharged home today after period of observation  Hydration   Observation  Risk factor modifications           Abnormal coronary CT angiography with 25 to 49% stenosis of the left   anterior descending coronary artery         The following portions of the patient's history were reviewed and updated as appropriate:  allergies, current medications, past family history, past medical history, past social history, past surgical history and problem list.    Allergies   Allergen Reactions   • Aspirin GI Intolerance     Nausea and vomitting   • Latex Rash     Blisters with tape and intolerance with gloves       Current Outpatient Medications:   •  albuterol sulfate  (90 Base) MCG/ACT inhaler, Inhale 2 puffs Every 6 (Six) Hours As Needed., Disp: , Rfl:   •  amLODIPine (NORVASC) 10 MG tablet, Take 1 tablet by mouth Daily., Disp: 90 tablet, Rfl: 4  •  ARIPiprazole (ABILIFY) 10 MG tablet, Take 10 mg by mouth every night at bedtime., Disp: , Rfl:   •  Blood Pressure Monitoring (SPHYGMOMANOMETER) misc, 1 Units 2 (Two) Times a Day., Disp: 1 each, Rfl: 0  •  bumetanide (BUMEX) 0.5 MG tablet, Take 1 tablet by mouth Daily., Disp: 90 tablet, Rfl: 3  •  carvedilol (COREG) 25 MG tablet, Take 1 tablet by mouth 2 (Two) Times a Day., Disp: 180 tablet, Rfl: 3  •  citalopram (CeleXA) 20 MG tablet, Take 40 mg by mouth Daily., Disp: , Rfl:   •  cloNIDine (CATAPRES) 0.1 MG tablet, Take 1 tablet by mouth 2 (Two) Times a Day., Disp: 180 tablet, Rfl: 3  •  cloNIDine (CATAPRES-TTS) 0.3 MG/24HR patch, Place 1 patch on the skin as directed by provider 1 (One) Time Per Week., Disp: 12 patch, Rfl: 3  •  doxepin (SINEquan) 25 MG capsule, 1 CAPSULE BY MOUTH AT BEDTIME FILL ON OR AFTER 1/4/2018, Disp: , Rfl: 1  •  DULoxetine (CYMBALTA) 30 MG capsule, , Disp: , Rfl:   •  gabapentin (NEURONTIN) 300 MG capsule, Take 300 mg by mouth 2 (Two) Times a Day., Disp: , Rfl:   •  gabapentin (NEURONTIN) 600 MG tablet, Take 900 mg by mouth Every Night., Disp: , Rfl:   •  HYDROcodone-acetaminophen (NORCO)  MG per tablet, Take 1 tablet by mouth Every 6 (Six) Hours., Disp: , Rfl:   •  hydrOXYzine pamoate (VISTARIL) 25 MG capsule, Take 25 mg by mouth Every 8 (Eight) Hours As Needed., Disp: , Rfl:   •  levETIRAcetam (KEPPRA) 500 MG tablet, Take 500 mg by mouth 2 (Two)  Times a Day., Disp: , Rfl:   •  lisinopril (PRINIVIL,ZESTRIL) 40 MG tablet, TAKE 1 TABLET BY MOUTH TWICE A DAY, Disp: 60 tablet, Rfl: 7  •  nitroglycerin (NITROSTAT) 0.4 MG SL tablet, Take no more than 3 doses in 15 minutes., Disp: 30 tablet, Rfl: 8  •  SUMAtriptan (IMITREX) 50 MG tablet, , Disp: , Rfl:   •  vitamin D (ERGOCALCIFEROL) 60549 units capsule capsule, Take 50,000 Units by mouth Every 7 (Seven) Days., Disp: , Rfl:   Past Medical History:   Diagnosis Date   • Arthritis    • Cancer (CMS/HCC)     CREVICAL, UTERINE   • COPD (chronic obstructive pulmonary disease) (CMS/HCC)    • Fatty liver    • GERD (gastroesophageal reflux disease)    • History of transfusion    • Hypertension    • Kidney stone    • Low back pain potentially associated with spinal stenosis    • Lupus (CMS/HCC)    • Migraines    • Scoliosis    • Shortness of breath    • UTI (urinary tract infection)      Past Surgical History:   Procedure Laterality Date   • BACK SURGERY      lumbar   • CARDIAC CATHETERIZATION     • CARDIAC CATHETERIZATION N/A 2020    Procedure: Cardiac Catheterization/Vascular Study Radial cath;  Surgeon: Robert Padilla MD;  Location: [facility] CATH INVASIVE LOCATION;  Service: Cardiology;  Laterality: N/A;   • CARDIAC DEFIBRILLATOR PLACEMENT     •  SECTION     • CHOLECYSTECTOMY     • COLON RESECTION       or    • COLONOSCOPY     • COLONOSCOPY N/A 5/10/2018    Procedure: COLONOSCOPY WITH ANESTHESIA;  Surgeon: Shawna Valladares MD;  Location: [facility] ENDOSCOPY;  Service: Gastroenterology   • ENDOSCOPY N/A 5/10/2018    Procedure: ESOPHAGOGASTRODUODENOSCOPY WITH ANESTHESIA;  Surgeon: Shawna Valladares MD;  Location: [facility] ENDOSCOPY;  Service: Gastroenterology   • EYE SURGERY      x 2   • HYSTERECTOMY     • LEG SURGERY Right     bars/bolts placed   • UPPER GASTROINTESTINAL ENDOSCOPY     • URETEROSCOPY LASER LITHOTRIPSY WITH STENT INSERTION Right 2018    Procedure: URETEROSCOPY  WITH STENT INSERTION  RETROGRADE PYELOGRAM;  Surgeon: Tyrell Hardin MD;  Location: [facility] OR;  Service: Urology     Family History   Problem Relation Age of Onset   • Cancer Mother    • Heart disease Mother    • Heart disease Father    • Lupus Sister    • Heart disease Sister    • Heart disease Sister    • No Known Problems Sister    • Hypokalemia Sister    • Alcohol abuse Sister    • Colon cancer Neg Hx    • Colon polyps Neg Hx      Social History     Socioeconomic History   • Marital status:      Spouse name: Not on file   • Number of children: Not on file   • Years of education: Not on file   • Highest education level: Not on file   Tobacco Use   • Smoking status: Current Every Day Smoker     Packs/day: 0.50     Years: 40.00     Pack years: 20.00     Types: Cigarettes   • Smokeless tobacco: Never Used   Substance and Sexual Activity   • Alcohol use: No   • Drug use: No   • Sexual activity: Defer       Review of Systems   Constitution: Positive for malaise/fatigue. Negative for chills, diaphoresis, weight gain and weight loss.   Eyes: Negative for blurred vision.   Cardiovascular: Negative for chest pain, claudication, cyanosis, dyspnea on exertion, irregular heartbeat, leg swelling, near-syncope, orthopnea, palpitations, paroxysmal nocturnal dyspnea and syncope.   Respiratory: Negative for shortness of breath.    Hematologic/Lymphatic: Negative for bleeding problem. Does not bruise/bleed easily.   Skin: Negative for dry skin and rash.   Musculoskeletal: Negative for myalgias, neck pain and stiffness.   Gastrointestinal: Negative for bloating, abdominal pain, heartburn, nausea and vomiting.   Neurological: Negative for focal weakness, headaches, light-headedness, numbness and weakness.   Psychiatric/Behavioral: The patient has insomnia.               Objective:     Vitals signs and nursing note reviewed.   Constitutional:       General: Not in acute distress.     Appearance: Normal and healthy appearance. Well-developed,  normal weight and not in distress. Not diaphoretic.   Eyes:      General: Lids are normal.         Right eye: No discharge.         Left eye: No discharge.      Conjunctiva/sclera: Conjunctivae normal.      Pupils: Pupils are equal, round, and reactive to light.   HENT:      Head: Normocephalic and atraumatic.      Jaw: There is normal jaw occlusion.      Right Ear: External ear normal.      Left Ear: External ear normal.      Nose: Nose normal.   Neck:      Musculoskeletal: Normal range of motion and neck supple.      Thyroid: No thyromegaly.      Vascular: No carotid bruit, JVD or JVR. JVD normal.      Trachea: Trachea normal. No tracheal deviation.   Pulmonary:      Effort: Pulmonary effort is normal. No respiratory distress.      Breath sounds: Examination of the right-upper field reveals decreased breath sounds. Examination of the left-upper field reveals decreased breath sounds. Examination of the right-middle field reveals decreased breath sounds. Examination of the left-middle field reveals decreased breath sounds. Examination of the right-lower field reveals decreased breath sounds. Examination of the left-lower field reveals decreased breath sounds. Decreased breath sounds present. No wheezing. No rhonchi. No rales.   Chest:      Chest wall: Not tender to palpatation.   Cardiovascular:      PMI at left midclavicular line. Normal rate. Regular rhythm. Normal S1. Normal S2.      Murmurs: There is no murmur.      No gallop. No click. No rub.   Pulses:     Intact distal pulses. No decreased pulses.   Abdominal:      General: Bowel sounds are normal. There is no distension.      Palpations: Abdomen is soft.      Tenderness: There is no abdominal tenderness.   Musculoskeletal: Normal range of motion.         General: No tenderness or deformity.   Skin:     General: Skin is warm and dry.      Coloration: Skin is not pale.      Findings: No erythema or rash.   Neurological:      General: No focal deficit present.  "     Mental Status: Alert, oriented to person, place, and time and oriented to person, place and time.   Psychiatric:         Attention and Perception: Attention and perception normal.         Mood and Affect: Mood and affect normal.         Speech: Speech normal.         Behavior: Behavior normal.         Thought Content: Thought content normal.         Cognition and Memory: Cognition and memory normal.         Judgment: Judgment normal.           Procedures  /94   Pulse 72   Ht 160 cm (63\")   Wt 82.1 kg (181 lb)   SpO2 98%   BMI 32.06 kg/m²   Lab Review:   Lab Results   Component Value Date    WBC 8.80 06/19/2020    HGB 16.6 (H) 06/19/2020    HCT 51.4 (H) 06/19/2020    MCV 88.9 06/19/2020     06/19/2020     Lab Results   Component Value Date    GLUCOSE 90 06/19/2020    BUN 16 06/19/2020    CREATININE 0.58 06/19/2020    EGFRIFNONA 106 06/19/2020    BCR 27.6 (H) 06/19/2020    K 4.3 06/19/2020    CO2 26.0 06/19/2020    CALCIUM 9.8 06/19/2020    ALBUMIN 4.50 06/19/2020    AST 15 06/19/2020    ALT 15 06/19/2020     Lab Results   Component Value Date    CHOL 186 10/26/2017    CHLPL 232 (H) 04/19/2016     Lab Results   Component Value Date    TRIG 185 (H) 10/26/2017    TRIG 267 (H) 04/19/2016     Lab Results   Component Value Date    HDL 55 10/26/2017    HDL 57 04/19/2016     Lab Results   Component Value Date     (H) 10/26/2017     (H) 04/19/2016       I have reviewed the most recent lab results.       Assessment:          Diagnosis Plan   1. Nonobstructive atherosclerosis of coronary artery  CTA coronary arteries 5/18/2020 - calcium score 253.57  Cath 6/19/2020 - nonobstructive disease   2. Essential hypertension  Start HCTZ 25 mg daily.    3. Mixed hyperlipidemia  Not currently on statin.  Last lipid panel 10/2017.  Recommend repeat lipid panel.   4. Enlargement of abdominal aorta (CMS/HCC) small infra renal  US aorta 8/14/2020 - no evidence of AAA   5. LVH (left ventricular " hypertrophy) mild     6. Gastroesophageal reflux disease without esophagitis  Managed by PCP.   7. Lupus (CMS/HCC) Follows with rheumatology.    8. Tobacco abuse  Akua Aguilar  reports that she has been smoking cigarettes. She has a 20.00 pack-year smoking history. She has never used smokeless tobacco.. I have educated her on the risk of diseases from using tobacco products such as cancer, COPD and heart diease.     I advised her to quit and she is not willing to quit.    I spent 3  minutes counseling the patient.          9. Class 1 obesity due to excess calories with serious comorbidity and body mass index (BMI) of 30.0 to 30.9 in adult Patient's Body mass index is 32.06 kg/m². BMI is above normal parameters. Recommendations include: educational material.            Plan:       1. Start HCTZ 25 mg daily. Continue other medications as previously prescribed.  2. Report any worsening symptoms.  3. Report any signs of bleeding.  4. Continue heart healthy diet and regular exercise as tolerated.   5. Follow up with PCP for blood pressure and cholesterol management and routine lab work.  6.  Monitor and record daily blood pressure. Report readings consistently higher than 140/90 or consistently lower than 100/60.   7. Follow up in one month, or sooner if needed.

## 2020-10-28 ENCOUNTER — OFFICE VISIT (OUTPATIENT)
Dept: CARDIOLOGY | Facility: CLINIC | Age: 60
End: 2020-10-28

## 2020-10-28 VITALS
BODY MASS INDEX: 32.07 KG/M2 | HEART RATE: 72 BPM | HEIGHT: 63 IN | WEIGHT: 181 LBS | DIASTOLIC BLOOD PRESSURE: 94 MMHG | SYSTOLIC BLOOD PRESSURE: 136 MMHG | OXYGEN SATURATION: 98 %

## 2020-10-28 DIAGNOSIS — I51.7 LVH (LEFT VENTRICULAR HYPERTROPHY): ICD-10-CM

## 2020-10-28 DIAGNOSIS — M32.9 LUPUS (HCC): ICD-10-CM

## 2020-10-28 DIAGNOSIS — Z72.0 TOBACCO ABUSE: ICD-10-CM

## 2020-10-28 DIAGNOSIS — E66.09 CLASS 1 OBESITY DUE TO EXCESS CALORIES WITH SERIOUS COMORBIDITY AND BODY MASS INDEX (BMI) OF 30.0 TO 30.9 IN ADULT: ICD-10-CM

## 2020-10-28 DIAGNOSIS — I25.10 NONOBSTRUCTIVE ATHEROSCLEROSIS OF CORONARY ARTERY: Primary | ICD-10-CM

## 2020-10-28 DIAGNOSIS — E78.2 MIXED HYPERLIPIDEMIA: ICD-10-CM

## 2020-10-28 DIAGNOSIS — I77.89 ENLARGEMENT OF ABDOMINAL AORTA (HCC): ICD-10-CM

## 2020-10-28 DIAGNOSIS — I10 ESSENTIAL HYPERTENSION: ICD-10-CM

## 2020-10-28 DIAGNOSIS — K21.9 GASTROESOPHAGEAL REFLUX DISEASE WITHOUT ESOPHAGITIS: ICD-10-CM

## 2020-10-28 PROCEDURE — 99214 OFFICE O/P EST MOD 30 MIN: CPT | Performed by: NURSE PRACTITIONER

## 2020-10-28 RX ORDER — ARIPIPRAZOLE 10 MG/1
10 TABLET ORAL
COMMUNITY
Start: 2020-10-06

## 2020-10-28 RX ORDER — DULOXETIN HYDROCHLORIDE 60 MG/1
60 CAPSULE, DELAYED RELEASE ORAL 2 TIMES DAILY
COMMUNITY
Start: 2020-10-26 | End: 2021-02-24

## 2020-10-28 RX ORDER — HYDROCHLOROTHIAZIDE 25 MG/1
25 TABLET ORAL DAILY
Qty: 90 TABLET | Refills: 3 | Status: SHIPPED | OUTPATIENT
Start: 2020-10-28 | End: 2020-12-09 | Stop reason: SDUPTHER

## 2020-11-03 PROBLEM — R55 SYNCOPE: Status: RESOLVED | Noted: 2020-11-03 | Resolved: 2020-11-03

## 2020-11-30 NOTE — TELEPHONE ENCOUNTER
[facility] Call Center    Phone Message    May a detailed message be left on voicemail: no     Reason for Call: Order(s): Other:   Reason for requested: CT Abdomen/Pelvis w/o Contrast  Date needed: Asap  Provider name: Dr. Butt  Comments: Pt told at 11/19 visit to have f/u CT scan, no order placed for this scan. Pt wants call to confirm order placement so he can call radiology to schedule.      Action Taken: Message routed to:  Other:  CLINICAL POOL    Travel Screening: Not Applicable                                                                       Please refill.  Patient needs to switch pharmacies.  Thank you!

## 2020-12-01 NOTE — PROGRESS NOTES
Subjective:     Encounter Date:12/09/2020      Patient ID: Akua Aguilar is a 60 y.o. female   HPI: Her visit today is for routine follow up of medication adjustments. She was started on HCTZ 25 mg daily at her last office visit on 10/28/20 . She reports improvement in high blood pressures, however blood pressures are still high at times. She complains of insomnia and increased fatigue over the last 2 months. Patient has a history of nonobstructive coronary artery disease, hypertension, hyperlipidemia, left ventricular hypertrophy, enlargement of abdominal aorta, GERD, and smoking. US of aorta 8/14/2020 at [facility] revealed no evidence of AAA. Patient denies chest pain, shortness of breath, dizziness, syncope, orthopnea, PND, edema, and decreased stamina. Patient denies signs of bleeding.    Chief Complaint: routine follow up  Hypertension  This is a chronic problem. The current episode started more than 1 year ago. The problem is uncontrolled. Associated symptoms include malaise/fatigue. Pertinent negatives include no anxiety, blurred vision, chest pain, headaches, neck pain, orthopnea, palpitations, peripheral edema, PND, shortness of breath or sweats. There are no associated agents to hypertension. Risk factors for coronary artery disease include dyslipidemia, post-menopausal state and obesity. Current antihypertension treatment includes alpha 1 blockers, diuretics, calcium channel blockers, beta blockers and ACE inhibitors. There is no history of angina, kidney disease, CAD/MI, CVA, heart failure, left ventricular hypertrophy, PVD or retinopathy. There is no history of chronic renal disease, coarctation of the aorta, hyperaldosteronism, hypercortisolism, hyperparathyroidism, a hypertension causing med, pheochromocytoma, renovascular disease, sleep apnea or a thyroid problem.   Hyperlipidemia  This is a chronic problem. The current episode started more than 1 year ago. The problem is uncontrolled.  Recent lipid tests were reviewed and are high. Exacerbating diseases include obesity. She has no history of chronic renal disease, diabetes, hypothyroidism, liver disease or nephrotic syndrome. Pertinent negatives include no chest pain, focal sensory loss, focal weakness, leg pain, myalgias or shortness of breath. She is currently on no antihyperlipidemic treatment. Risk factors for coronary artery disease include dyslipidemia, hypertension and post-menopausal.       Previous Cardiac Testing:  Results for orders placed during the hospital encounter of 08/08/19   Adult Transthoracic Echo Complete W/ Cont if Necessary Per Protocol    Narrative · Estimated EF = 60%.  · Left ventricular diastolic dysfunction.  · Left ventricular wall thickness is consistent with mild concentric   hypertrophy.  · No evidence of pulmonary hypertension is present.        Results for orders placed during the hospital encounter of 06/19/20   Cardiac Catheterization/Vascular Study    Narrative Cardiac Catheterization Operative Report    Patient was referred for cardiac catheterization .   Indications for the procedure include: Symptoms suggestive of angina with   high suspicion for significant obstructive coronary artery disease   With ongoing chest pain    Procedure performed    Coronary angiography  Left heart catheterization  Left ventriculography  Fractional flow reserve of the left anterior descending coronary artery  Supervision of the administration of moderate sedation    Fractional flow reserve of left anterior descending coronary artery using   Ikari 3.56 Mosotho guide  Usual protocol was followed.  Adenosine was used to the or hyperemia.    Guide used as mentioned above.  The guide was advanced and the ostium of the vessel was engaged.  We   zeroed the status post aortic pressure then we advanced a fractional flow   reserve wire.  This was equalized prior to crossing the lesion.  Then we   crossed the lesion.  Anticoagulation was  used prior to insertion of the   wire.  After crossing the lesion adenosine was used.  This caused   hyperemia.  Fractional flow reserve was then performed and documented as   noted.  End of case the wire was removed removed angiography was performed   before and after removing the wire to document no complication and decide   results were achieved.       Procedure Details  The risks, benefits, complications, treatment options, and expected   outcomes were discussed with the patient. Plan is for moderate sedation,   and the patient agrees to proceed with the procedure.  An immediate   assessment was done prior to the administration of moderate sedation.     The patient and/or family concurred with the proposed plan, giving   informed consent. Patient was brought to the cath lab after IV hydration   was begun and oral premedication was given. The was further sedated with   Fentanyl  and Versed.    The patient was prepped and draped in the usual manner. Using the modified   Seldinger access technique, a 6f Hungarian sheath was placed in the right   radial  artery.   A left heart catheterization was done. Angiograms were also done.        Cardiac Catheterization Operative Report      Patient was referred for cardiac catheterization: High suspicion for   coronary artery disease    Procedure Details  The risks, benefits, complications, treatment options, and expected   outcomes were discussed with the patient. Plan is for moderate sedation,   and the patient agrees to proceed with the procedure.  An immediate   assessment was done prior to the administration of moderate sedation.    The patient and/or family concurred with the proposed plan, giving   informed consent. Patient was brought to the cath lab after IV hydration   was begun and oral premedication was given.     The skin overlying the patient's right radial artery was prepped and   draped in the usual sterile fashion.  Timeout was taken to confirm the   correct  patient and procedure.  Lidocaine was administered for local   anesthesia.  IV Versed and fentanyl were used to achieve conscious   sedation.  Modified Seldinger technique was then used to place a 6 American   sheath in the right radial artery    Diagnostic coronary angiography was performed with Tig coronary catheter.    Coronary angiogram were performed in VANCE and JIMÉNEZ projection to evaluate   the coronary arterial systems.      A TR band device was used to achieve hemostasis at the end of the   procedure.  The patient tolerated the procedure well, and there were no   immediate complications.    Procedural Details: After written and informed consent was obtained, the   patient was brought to the cath lab in a fasting state.       1. Selective coronary angiography:    Left main coronary artery:  The left main coronary artery arises from the   left coronary cusp and bifurcates into the  left anterior descending   coronary artery  and left circumflex arteries.      Left anterior descending artery:  The  left anterior descending coronary   artery   arises normally from the left main coronary artery and courses in   the anterior interventricular groove and terminates at the apex.  50%   stenosis noted of the proximal left anterior descending coronary artery   with large circumferential soft atherosclerotic plaque  Normal fractional flow reserve of 0.9 or higher    Left circumflex:  The left circumflex arises form the left man artery and   supplies obtuse marginal branches.  Minor atherosclerotic plaque noted in   the mid left circumflex coronary artery which is dominant and large    Right coronary artery:  The right coronary artery  arises normally from   the right coronary cusp and is dominant for the posterior circulation.    The right coronary artery  is non-dominant and normal.    2. Left heart cath: Moderately elevated left ventricular end-diastolic   pressure of 24 mmHg.    3. LV Gram: Normal left ventricular  ejection fraction of 55% with no   regional wall motion abnormalities.    4. Interventions: None    Estimated Blood Loss:  Minimal    Specimens: None         Complications:  None; patient tolerated the procedure well.           Disposition: Cardiovascular observation unit           Condition: stable            I supervised the administration of conscious sedation by nursing staff   throughout the case.  First dose was given at 1119 and the end of my   face-to-face encounter was at 1145 Hours.    During the case, continuous pulse oximetry, heart rate, blood pressure,   and patient status were monitored.       Risk, Benefits, and Alternatives discussed with the patient and/or family.    Plan is for moderate sedation, and the patient agrees to proceed with the   procedure.  An immediate assessment was done prior to the administration   of moderate sedation      Conclusion    50% stenosis of proximal left anterior descending coronary artery with a   normal fractional flow reserve 0.9 or higher with a large circumferential   plaque burden for which aggressive medical therapy is advised  Large dominant left circumflex coronary artery with minor atherosclerotic   plaque in the midsegment without any significant lesions  Right coronary artery is nondominant small and normal  Moderately elevated left ventricular end-diastolic pressure of 24 mmHg  Normal left ventricular ejection fraction of 55% without any discrete wall   motion abnormalities and no significant mitral regurgitation        Plan    Keep LDL well below 70 mg/dL  Treat for elevated left ventricular end-diastolic pressure  Can be discharged home today after period of observation  Hydration   Observation  Risk factor modifications           Abnormal coronary CT angiography with 25 to 49% stenosis of the left   anterior descending coronary artery         The following portions of the patient's history were reviewed and updated as appropriate: allergies, current  medications, past family history, past medical history, past social history, past surgical history and problem list.    Allergies   Allergen Reactions   • Aspirin GI Intolerance     Nausea and vomitting   • Latex Rash     Blisters with tape and intolerance with gloves       Current Outpatient Medications:   •  albuterol sulfate  (90 Base) MCG/ACT inhaler, Inhale 2 puffs Every 6 (Six) Hours As Needed., Disp: , Rfl:   •  amLODIPine (NORVASC) 10 MG tablet, Take 1 tablet by mouth Daily., Disp: 90 tablet, Rfl: 4  •  ARIPiprazole (ABILIFY) 10 MG tablet, Take 10 mg by mouth every night at bedtime., Disp: , Rfl:   •  atenolol (TENORMIN) 50 MG tablet, Take 50 mg by mouth Daily., Disp: , Rfl:   •  Blood Pressure Monitoring (SPHYGMOMANOMETER) misc, 1 Units 2 (Two) Times a Day., Disp: 1 each, Rfl: 0  •  carvedilol (COREG) 25 MG tablet, Take 1 tablet by mouth 2 (Two) Times a Day., Disp: 180 tablet, Rfl: 3  •  citalopram (CeleXA) 20 MG tablet, Take 40 mg by mouth Daily., Disp: , Rfl:   •  clonazePAM (KlonoPIN) 0.5 MG tablet, Take 0.5 mg by mouth 2 (Two) Times a Day., Disp: , Rfl:   •  cloNIDine (CATAPRES) 0.1 MG tablet, Take 1 tablet by mouth 2 (Two) Times a Day., Disp: 180 tablet, Rfl: 3  •  cloNIDine (CATAPRES-TTS) 0.3 MG/24HR patch, Place 1 patch on the skin as directed by provider 1 (One) Time Per Week., Disp: 12 patch, Rfl: 3  •  doxepin (SINEquan) 25 MG capsule, 1 CAPSULE BY MOUTH AT BEDTIME FILL ON OR AFTER 1/4/2018, Disp: , Rfl: 1  •  DULoxetine (CYMBALTA) 30 MG capsule, Take 60 mg by mouth 2 (Two) Times a Day., Disp: , Rfl:   •  gabapentin (NEURONTIN) 300 MG capsule, Take 300 mg by mouth 2 (Two) Times a Day., Disp: , Rfl:   •  gabapentin (NEURONTIN) 600 MG tablet, Take 900 mg by mouth Every Night., Disp: , Rfl:   •  hydroCHLOROthiazide (HYDRODIURIL) 25 MG tablet, Take 1 tablet by mouth Daily., Disp: 90 tablet, Rfl: 3  •  HYDROcodone-acetaminophen (NORCO)  MG per tablet, Take 1 tablet by mouth Every 6 (Six)  Hours., Disp: , Rfl:   •  hydrOXYzine pamoate (VISTARIL) 25 MG capsule, Take 25 mg by mouth Every 8 (Eight) Hours As Needed., Disp: , Rfl:   •  levETIRAcetam (KEPPRA) 500 MG tablet, Take 500 mg by mouth 2 (Two) Times a Day., Disp: , Rfl:   •  lisinopril (PRINIVIL,ZESTRIL) 40 MG tablet, TAKE 1 TABLET BY MOUTH TWICE A DAY, Disp: 60 tablet, Rfl: 7  •  nitroglycerin (NITROSTAT) 0.4 MG SL tablet, Take no more than 3 doses in 15 minutes., Disp: 30 tablet, Rfl: 8  •  SUMAtriptan (IMITREX) 50 MG tablet, 1 (One) Time As Needed., Disp: , Rfl:   •  vitamin D (ERGOCALCIFEROL) 45331 units capsule capsule, Take 50,000 Units by mouth Every 7 (Seven) Days., Disp: , Rfl:   •  atorvastatin (LIPITOR) 10 MG tablet, Take 1 tablet by mouth Daily., Disp: 30 tablet, Rfl: 11  Past Medical History:   Diagnosis Date   • Arthritis    • Cancer (CMS/HCC)     CREVICAL, UTERINE   • COPD (chronic obstructive pulmonary disease) (CMS/HCC)    • Fatty liver    • GERD (gastroesophageal reflux disease)    • History of transfusion    • Hypertension    • Kidney stone    • Low back pain potentially associated with spinal stenosis    • Lupus (CMS/HCC)    • Migraines    • Scoliosis    • Shortness of breath    • UTI (urinary tract infection)      Past Surgical History:   Procedure Laterality Date   • BACK SURGERY      lumbar   • CARDIAC CATHETERIZATION     • CARDIAC CATHETERIZATION N/A 2020    Procedure: Cardiac Catheterization/Vascular Study Radial cath;  Surgeon: Robert Padilla MD;  Location: [facility] CATH INVASIVE LOCATION;  Service: Cardiology;  Laterality: N/A;   • CARDIAC DEFIBRILLATOR PLACEMENT     •  SECTION     • CHOLECYSTECTOMY     • COLON RESECTION       or    • COLONOSCOPY     • COLONOSCOPY N/A 5/10/2018    Procedure: COLONOSCOPY WITH ANESTHESIA;  Surgeon: Shawna Valladares MD;  Location: [facility] ENDOSCOPY;  Service: Gastroenterology   • ENDOSCOPY N/A 5/10/2018    Procedure: ESOPHAGOGASTRODUODENOSCOPY WITH ANESTHESIA;  Surgeon:  Shawna Valladares MD;  Location: [facility] ENDOSCOPY;  Service: Gastroenterology   • EYE SURGERY      x 2   • HYSTERECTOMY     • LEG SURGERY Right     bars/bolts placed   • UPPER GASTROINTESTINAL ENDOSCOPY  1993   • URETEROSCOPY LASER LITHOTRIPSY WITH STENT INSERTION Right 11/14/2018    Procedure: URETEROSCOPY  WITH STENT INSERTION RETROGRADE PYELOGRAM;  Surgeon: Tyrell Hardin MD;  Location: [facility] OR;  Service: Urology     Family History   Problem Relation Age of Onset   • Cancer Mother    • Heart disease Mother    • Heart disease Father    • Lupus Sister    • Heart disease Sister    • Heart disease Sister    • No Known Problems Sister    • Hypokalemia Sister    • Alcohol abuse Sister    • Colon cancer Neg Hx    • Colon polyps Neg Hx      Social History     Socioeconomic History   • Marital status:      Spouse name: Not on file   • Number of children: Not on file   • Years of education: Not on file   • Highest education level: Not on file   Tobacco Use   • Smoking status: Current Every Day Smoker     Packs/day: 0.50     Years: 40.00     Pack years: 20.00     Types: Cigarettes   • Smokeless tobacco: Never Used   Substance and Sexual Activity   • Alcohol use: No   • Drug use: No   • Sexual activity: Defer       Review of Systems   Constitution: Positive for malaise/fatigue. Negative for chills, diaphoresis, weight gain and weight loss.   Eyes: Negative for blurred vision.   Cardiovascular: Negative for chest pain, claudication, cyanosis, dyspnea on exertion, irregular heartbeat, leg swelling, near-syncope, orthopnea, palpitations, paroxysmal nocturnal dyspnea and syncope.   Respiratory: Negative for shortness of breath.    Hematologic/Lymphatic: Negative for bleeding problem. Does not bruise/bleed easily.   Skin: Negative for dry skin and rash.   Musculoskeletal: Negative for myalgias, neck pain and stiffness.   Gastrointestinal: Negative for bloating, abdominal pain, heartburn, nausea and vomiting.    Neurological: Negative for focal weakness, headaches, light-headedness, numbness and weakness.   Psychiatric/Behavioral: The patient has insomnia.               Objective:     Vitals signs and nursing note reviewed.   Constitutional:       General: Not in acute distress.     Appearance: Normal and healthy appearance. Well-developed, normal weight and not in distress. Not diaphoretic.   Eyes:      General: Lids are normal.         Right eye: No discharge.         Left eye: No discharge.      Conjunctiva/sclera: Conjunctivae normal.      Pupils: Pupils are equal, round, and reactive to light.   HENT:      Head: Normocephalic and atraumatic.      Jaw: There is normal jaw occlusion.      Right Ear: External ear normal.      Left Ear: External ear normal.      Nose: Nose normal.   Neck:      Musculoskeletal: Normal range of motion and neck supple.      Thyroid: No thyromegaly.      Vascular: No carotid bruit, JVD or JVR. JVD normal.      Trachea: Trachea normal. No tracheal deviation.   Pulmonary:      Effort: Pulmonary effort is normal. No respiratory distress.      Breath sounds: Examination of the right-upper field reveals decreased breath sounds. Examination of the left-upper field reveals decreased breath sounds. Examination of the right-middle field reveals decreased breath sounds. Examination of the left-middle field reveals decreased breath sounds. Examination of the right-lower field reveals decreased breath sounds. Examination of the left-lower field reveals decreased breath sounds. Decreased breath sounds present. No wheezing. No rhonchi. No rales.   Chest:      Chest wall: Not tender to palpatation.   Cardiovascular:      PMI at left midclavicular line. Normal rate. Regular rhythm. Normal S1. Normal S2.      Murmurs: There is no murmur.      No gallop. No click. No rub.   Pulses:     Intact distal pulses. No decreased pulses.   Abdominal:      General: Bowel sounds are normal. There is no distension.       "Palpations: Abdomen is soft.      Tenderness: There is no abdominal tenderness.   Musculoskeletal: Normal range of motion.         General: No tenderness or deformity.   Skin:     General: Skin is warm and dry.      Coloration: Skin is not pale.      Findings: No erythema or rash.   Neurological:      General: No focal deficit present.      Mental Status: Alert, oriented to person, place, and time and oriented to person, place and time.   Psychiatric:         Attention and Perception: Attention and perception normal.         Mood and Affect: Mood and affect normal.         Speech: Speech normal.         Behavior: Behavior normal.         Thought Content: Thought content normal.         Cognition and Memory: Cognition and memory normal.         Judgment: Judgment normal.           Procedures  /90   Pulse 72   Ht 160 cm (63\")   Wt 80.7 kg (178 lb)   SpO2 96%   BMI 31.53 kg/m²   Lab Review:   Lab Results   Component Value Date    WBC 8.80 06/19/2020    HGB 16.6 (H) 06/19/2020    HCT 51.4 (H) 06/19/2020    MCV 88.9 06/19/2020     06/19/2020     Lab Results   Component Value Date    GLUCOSE 90 06/19/2020    BUN 16 06/19/2020    CREATININE 0.58 06/19/2020    EGFRIFNONA 106 06/19/2020    BCR 27.6 (H) 06/19/2020    K 4.3 06/19/2020    CO2 26.0 06/19/2020    CALCIUM 9.8 06/19/2020    ALBUMIN 4.50 06/19/2020    AST 15 06/19/2020    ALT 15 06/19/2020     Lab Results   Component Value Date    CHOL 186 10/26/2017    CHLPL 232 (H) 04/19/2016     Lab Results   Component Value Date    TRIG 185 (H) 10/26/2017    TRIG 267 (H) 04/19/2016     Lab Results   Component Value Date    HDL 55 10/26/2017    HDL 57 04/19/2016     Lab Results   Component Value Date     (H) 10/26/2017     (H) 04/19/2016       I have reviewed the most recent lab results.       Assessment:          Diagnosis Plan   1. Nonobstructive atherosclerosis of coronary artery  CTA coronary arteries 5/18/2020 - calcium score 253.57  Cath " 6/19/2020 - nonobstructive disease   2. Essential hypertension  Better controlled. Increase HCTZ to 50 mg daily.    3. Mixed hyperlipidemia  Not currently on statin. Start Lipitor 10 mg daily.    4. Enlargement of abdominal aorta (CMS/HCC) small infra renal  US aorta 8/14/2020 - no evidence of AAA   5. LVH (left ventricular hypertrophy) mild     6. Gastroesophageal reflux disease without esophagitis  Managed by PCP.   7. Lupus (CMS/HCC) Follows with rheumatology.    8. Tobacco abuse  Akua Aguilar  reports that she has been smoking cigarettes. She has a 20.00 pack-year smoking history. She has never used smokeless tobacco.. I have educated her on the risk of diseases from using tobacco products such as cancer, COPD and heart diease.     I advised her to quit and she is not willing to quit.    I spent 3  minutes counseling the patient.          9. Class 1 obesity due to excess calories with serious comorbidity and body mass index (BMI) of 31.0 to 31.9 in adult Patient's Body mass index is 31.53 kg/m². BMI is above normal parameters. Recommendations include: educational material.            Plan:       1. Increase HCTZ to 50 mg daily. Start Lipitor 10 mg daily. Change clonidine to 0.1 mg twice daily as needed for systolic blood pressure over 160. Continue other medications as previously prescribed.  2. Report any worsening symptoms.  3. Report any signs of bleeding.  4. Continue heart healthy diet and regular exercise as tolerated.   5. Follow up with PCP for blood pressure and cholesterol management and routine lab work.  6.  Monitor and record daily blood pressure. Report readings consistently higher than 140/90 or consistently lower than 100/60.   7. Follow up in one month, or sooner if needed.

## 2020-12-01 NOTE — PATIENT INSTRUCTIONS

## 2020-12-09 ENCOUNTER — OFFICE VISIT (OUTPATIENT)
Dept: CARDIOLOGY | Facility: CLINIC | Age: 60
End: 2020-12-09

## 2020-12-09 VITALS
HEIGHT: 63 IN | BODY MASS INDEX: 31.54 KG/M2 | WEIGHT: 178 LBS | HEART RATE: 72 BPM | SYSTOLIC BLOOD PRESSURE: 126 MMHG | OXYGEN SATURATION: 96 % | DIASTOLIC BLOOD PRESSURE: 90 MMHG

## 2020-12-09 DIAGNOSIS — I25.10 NONOBSTRUCTIVE ATHEROSCLEROSIS OF CORONARY ARTERY: Primary | ICD-10-CM

## 2020-12-09 DIAGNOSIS — K21.9 GASTROESOPHAGEAL REFLUX DISEASE WITHOUT ESOPHAGITIS: ICD-10-CM

## 2020-12-09 DIAGNOSIS — E78.2 MIXED HYPERLIPIDEMIA: ICD-10-CM

## 2020-12-09 DIAGNOSIS — E66.09 CLASS 1 OBESITY DUE TO EXCESS CALORIES WITH SERIOUS COMORBIDITY AND BODY MASS INDEX (BMI) OF 31.0 TO 31.9 IN ADULT: ICD-10-CM

## 2020-12-09 DIAGNOSIS — I10 ESSENTIAL HYPERTENSION: ICD-10-CM

## 2020-12-09 DIAGNOSIS — I51.7 LVH (LEFT VENTRICULAR HYPERTROPHY): ICD-10-CM

## 2020-12-09 DIAGNOSIS — Z72.0 TOBACCO ABUSE: ICD-10-CM

## 2020-12-09 DIAGNOSIS — M32.9 LUPUS (HCC): ICD-10-CM

## 2020-12-09 DIAGNOSIS — I77.89 ENLARGEMENT OF ABDOMINAL AORTA (HCC): ICD-10-CM

## 2020-12-09 PROCEDURE — 99214 OFFICE O/P EST MOD 30 MIN: CPT | Performed by: NURSE PRACTITIONER

## 2020-12-09 RX ORDER — HYDROCHLOROTHIAZIDE 50 MG/1
50 TABLET ORAL DAILY
Qty: 90 TABLET | Refills: 3 | Status: SHIPPED | OUTPATIENT
Start: 2020-12-09 | End: 2021-11-30

## 2020-12-09 RX ORDER — ATENOLOL 50 MG/1
50 TABLET ORAL DAILY
COMMUNITY
Start: 2020-12-01 | End: 2021-01-11

## 2020-12-09 RX ORDER — ATORVASTATIN CALCIUM 10 MG/1
10 TABLET, FILM COATED ORAL DAILY
Qty: 30 TABLET | Refills: 11 | Status: SHIPPED | OUTPATIENT
Start: 2020-12-09 | End: 2021-11-21 | Stop reason: SDUPTHER

## 2020-12-09 RX ORDER — CLONIDINE HYDROCHLORIDE 0.1 MG/1
0.1 TABLET ORAL 2 TIMES DAILY PRN
Qty: 180 TABLET | Refills: 3 | Status: SHIPPED | OUTPATIENT
Start: 2020-12-09 | End: 2021-11-30

## 2020-12-09 RX ORDER — CLONAZEPAM 0.5 MG/1
0.5 TABLET ORAL 2 TIMES DAILY
COMMUNITY
Start: 2020-11-30 | End: 2021-07-27

## 2020-12-10 RX ORDER — NITROGLYCERIN 0.4 MG/1
TABLET SUBLINGUAL
Qty: 30 TABLET | Refills: 3 | Status: SHIPPED | OUTPATIENT
Start: 2020-12-10 | End: 2021-07-27 | Stop reason: SDUPTHER

## 2020-12-22 ENCOUNTER — TELEPHONE (OUTPATIENT)
Dept: NEUROLOGY | Age: 60
End: 2020-12-22

## 2020-12-22 NOTE — TELEPHONE ENCOUNTER
Called pt to reschedule no show appt with teressa, left a voicemail for the pt to give us a call back if pt would like to reschedule.

## 2021-01-08 NOTE — PATIENT INSTRUCTIONS
Exercising to Lose Weight  Exercise is structured, repetitive physical activity to improve fitness and health. Getting regular exercise is important for everyone. It is especially important if you are overweight. Being overweight increases your risk of heart disease, stroke, diabetes, high blood pressure, and several types of cancer. Reducing your calorie intake and exercising can help you lose weight.  Exercise is usually categorized as moderate or vigorous intensity. To lose weight, most people need to do a certain amount of moderate-intensity or vigorous-intensity exercise each week.  Moderate-intensity exercise    Moderate-intensity exercise is any activity that gets you moving enough to burn at least three times more energy (calories) than if you were sitting.  Examples of moderate exercise include:  · Walking a mile in 15 minutes.  · Doing light yard work.  · Biking at an easy pace.  Most people should get at least 150 minutes (2 hours and 30 minutes) a week of moderate-intensity exercise to maintain their body weight.  Vigorous-intensity exercise  Vigorous-intensity exercise is any activity that gets you moving enough to burn at least six times more calories than if you were sitting. When you exercise at this intensity, you should be working hard enough that you are not able to carry on a conversation.  Examples of vigorous exercise include:  · Running.  · Playing a team sport, such as football, basketball, and soccer.  · Jumping rope.  Most people should get at least 75 minutes (1 hour and 15 minutes) a week of vigorous-intensity exercise to maintain their body weight.  How can exercise affect me?  When you exercise enough to burn more calories than you eat, you lose weight. Exercise also reduces body fat and builds muscle. The more muscle you have, the more calories you burn. Exercise also:  · Improves mood.  · Reduces stress and tension.  · Improves your overall fitness, flexibility, and  endurance.  · Increases bone strength.  The amount of exercise you need to lose weight depends on:  · Your age.  · The type of exercise.  · Any health conditions you have.  · Your overall physical ability.  Talk to your health care provider about how much exercise you need and what types of activities are safe for you.  What actions can I take to lose weight?  Nutrition    · Make changes to your diet as told by your health care provider or diet and nutrition specialist (dietitian). This may include:  ? Eating fewer calories.  ? Eating more protein.  ? Eating less unhealthy fats.  ? Eating a diet that includes fresh fruits and vegetables, whole grains, low-fat dairy products, and lean protein.  ? Avoiding foods with added fat, salt, and sugar.  · Drink plenty of water while you exercise to prevent dehydration or heat stroke.  Activity  · Choose an activity that you enjoy and set realistic goals. Your health care provider can help you make an exercise plan that works for you.  · Exercise at a moderate or vigorous intensity most days of the week.  ? The intensity of exercise may vary from person to person. You can tell how intense a workout is for you by paying attention to your breathing and heartbeat. Most people will notice their breathing and heartbeat get faster with more intense exercise.  · Do resistance training twice each week, such as:  ? Push-ups.  ? Sit-ups.  ? Lifting weights.  ? Using resistance bands.  · Getting short amounts of exercise can be just as helpful as long structured periods of exercise. If you have trouble finding time to exercise, try to include exercise in your daily routine.  ? Get up, stretch, and walk around every 30 minutes throughout the day.  ? Go for a walk during your lunch break.  ? Park your car farther away from your destination.  ? If you take public transportation, get off one stop early and walk the rest of the way.  ? Make phone calls while standing up and walking  around.  ? Take the stairs instead of elevators or escalators.  · Wear comfortable clothes and shoes with good support.  · Do not exercise so much that you hurt yourself, feel dizzy, or get very short of breath.  Where to find more information  · [place] Department of Health and Human Services: www.hhs.gov  · Centers for Disease Control and Prevention (CDC): www.cdc.gov  Contact a health care provider:  · Before starting a new exercise program.  · If you have questions or concerns about your weight.  · If you have a medical problem that keeps you from exercising.  Get help right away if you have any of the following while exercising:  · Injury.  · Dizziness.  · Difficulty breathing or shortness of breath that does not go away when you stop exercising.  · Chest pain.  · Rapid heartbeat.  Summary  · Being overweight increases your risk of heart disease, stroke, diabetes, high blood pressure, and several types of cancer.  · Losing weight happens when you burn more calories than you eat.  · Reducing the amount of calories you eat in addition to getting regular moderate or vigorous exercise each week helps you lose weight.  This information is not intended to replace advice given to you by your health care provider. Make sure you discuss any questions you have with your health care provider.  Document Revised: 12/31/2018 Document Reviewed: 12/31/2018  [employer] Patient Education © 2020 [employer]  Heart-Healthy Eating Plan  Heart-healthy meal planning includes:  · Eating less unhealthy fats.  · Eating more healthy fats.  · Making other changes in your diet.  Talk with your doctor or a diet specialist (dietitian) to create an eating plan that is right for you.  What is my plan?  Your doctor may recommend an eating plan that includes:  · Total fat: ______% or less of total calories a day.  · Saturated fat: ______% or less of total calories a day.  · Cholesterol: less than _________mg a day.  What are tips for following this  plan?  Cooking  Avoid frying your food. Try to bake, boil, grill, or broil it instead. You can also reduce fat by:  · Removing the skin from poultry.  · Removing all visible fats from meats.  · Steaming vegetables in water or broth.  Meal planning    · At meals, divide your plate into four equal parts:  ? Fill one-half of your plate with vegetables and green salads.  ? Fill one-fourth of your plate with whole grains.  ? Fill one-fourth of your plate with lean protein foods.  · Eat 4-5 servings of vegetables per day. A serving of vegetables is:  ? 1 cup of raw or cooked vegetables.  ? 2 cups of raw leafy greens.  · Eat 4-5 servings of fruit per day. A serving of fruit is:  ? 1 medium whole fruit.  ? ¼ cup of dried fruit.  ? ½ cup of fresh, frozen, or canned fruit.  ? ½ cup of 100% fruit juice.  · Eat more foods that have soluble fiber. These are apples, broccoli, carrots, beans, peas, and barley. Try to get 20-30 g of fiber per day.  · Eat 4-5 servings of nuts, legumes, and seeds per week:  ? 1 serving of dried beans or legumes equals ½ cup after being cooked.  ? 1 serving of nuts is ¼ cup.  ? 1 serving of seeds equals 1 tablespoon.  General information  · Eat more home-cooked food. Eat less restaurant, buffet, and fast food.  · Limit or avoid alcohol.  · Limit foods that are high in starch and sugar.  · Avoid fried foods.  · Lose weight if you are overweight.  · Keep track of how much salt (sodium) you eat. This is important if you have high blood pressure. Ask your doctor to tell you more about this.  · Try to add vegetarian meals each week.  Fats  · Choose healthy fats. These include olive oil and canola oil, flaxseeds, walnuts, almonds, and seeds.  · Eat more omega-3 fats. These include salmon, mackerel, sardines, tuna, flaxseed oil, and ground flaxseeds. Try to eat fish at least 2 times each week.  · Check food labels. Avoid foods with trans fats or high amounts of saturated fat.  · Limit saturated  fats.  ? These are often found in animal products, such as meats, butter, and cream.  ? These are also found in plant foods, such as palm oil, palm kernel oil, and coconut oil.  · Avoid foods with partially hydrogenated oils in them. These have trans fats. Examples are stick margarine, some tub margarines, cookies, crackers, and other baked goods.  What foods can I eat?  Fruits  All fresh, canned (in natural juice), or frozen fruits.  Vegetables  Fresh or frozen vegetables (raw, steamed, roasted, or grilled). Green salads.  Grains  Most grains. Choose whole wheat and whole grains most of the time. Rice and pasta, including brown rice and pastas made with whole wheat.  Meats and other proteins  Lean, well-trimmed beef, veal, pork, and lamb. Chicken and turkey without skin. All fish and shellfish. Wild duck, rabbit, pheasant, and venison. Egg whites or low-cholesterol egg substitutes. Dried beans, peas, lentils, and tofu. Seeds and most nuts.  Dairy  Low-fat or nonfat cheeses, including ricotta and mozzarella. Skim or 1% milk that is liquid, powdered, or evaporated. Buttermilk that is made with low-fat milk. Nonfat or low-fat yogurt.  Fats and oils  Non-hydrogenated (trans-free) margarines. Vegetable oils, including soybean, sesame, sunflower, olive, peanut, safflower, corn, canola, and cottonseed. Salad dressings or mayonnaise made with a vegetable oil.  Beverages  Mineral water. Coffee and tea. Diet carbonated beverages.  Sweets and desserts  Sherbet, gelatin, and fruit ice. Small amounts of dark chocolate.  Limit all sweets and desserts.  Seasonings and condiments  All seasonings and condiments.  The items listed above may not be a complete list of foods and drinks you can eat. Contact a dietitian for more options.  What foods should I avoid?  Fruits  Canned fruit in heavy syrup. Fruit in cream or butter sauce. Fried fruit. Limit coconut.  Vegetables  Vegetables cooked in cheese, cream, or butter sauce. Fried  vegetables.  Grains  Breads that are made with saturated or trans fats, oils, or whole milk. Croissants. Sweet rolls. Donuts. High-fat crackers, such as cheese crackers.  Meats and other proteins  Fatty meats, such as hot dogs, ribs, sausage, segura, rib-eye roast or steak. High-fat deli meats, such as salami and bologna. Caviar. Domestic duck and goose. Organ meats, such as liver.  Dairy  Cream, sour cream, cream cheese, and creamed cottage cheese. Whole-milk cheeses. Whole or 2% milk that is liquid, evaporated, or condensed. Whole buttermilk. Cream sauce or high-fat cheese sauce. Yogurt that is made from whole milk.  Fats and oils  Meat fat, or shortening. Cocoa butter, hydrogenated oils, palm oil, coconut oil, palm kernel oil. Solid fats and shortenings, including segura fat, salt pork, lard, and butter. Nondairy cream substitutes. Salad dressings with cheese or sour cream.  Beverages  Regular sodas and juice drinks with added sugar.  Sweets and desserts  Frosting. Pudding. Cookies. Cakes. Pies. Milk chocolate or white chocolate. Buttered syrups. Full-fat ice cream or ice cream drinks.  The items listed above may not be a complete list of foods and drinks to avoid. Contact a dietitian for more information.  Summary  · Heart-healthy meal planning includes eating less unhealthy fats, eating more healthy fats, and making other changes in your diet.  · Eat a balanced diet. This includes fruits and vegetables, low-fat or nonfat dairy, lean protein, nuts and legumes, whole grains, and heart-healthy oils and fats.  This information is not intended to replace advice given to you by your health care provider. Make sure you discuss any questions you have with your health care provider.  Document Revised: 02/21/2019 Document Reviewed: 01/25/2019  [employer] Patient Education © 2020 [employer]  Obesity, Adult  Obesity is having too much body fat. Being obese means that your weight is more than what is healthy for you.  BMI is  a number that explains how much body fat you have. If you have a BMI of 30 or more, you are obese. Obesity is often caused by eating or drinking more calories than your body uses. Changing your lifestyle can help you lose weight.  Obesity can cause serious health problems, such as:  · Stroke.  · Coronary artery disease (CAD).  · Type 2 diabetes.  · Some types of cancer, including cancers of the colon, breast, uterus, and gallbladder.  · Osteoarthritis.  · High blood pressure (hypertension).  · High cholesterol.  · Sleep apnea.  · Gallbladder stones.  · Infertility problems.  What are the causes?  · Eating meals each day that are high in calories, sugar, and fat.  · Being born with genes that may make you more likely to become obese.  · Having a medical condition that causes obesity.  · Taking certain medicines.  · Sitting a lot (having a sedentary lifestyle).  · Not getting enough sleep.  · Drinking a lot of drinks that have sugar in them.  What increases the risk?  · Having a family history of obesity.  · Being an  woman.  · Being a  man.  · Living in an area with limited access to:  ? Pepper, recreation centers, or sidewalks.  ? Healthy food choices, such as grocery stores and farmers' markets.  What are the signs or symptoms?  The main sign is having too much body fat.  How is this treated?  · Treatment for this condition often includes changing your lifestyle. Treatment may include:  ? Changing your diet. This may include making a healthy meal plan.  ? Exercise. This may include activity that causes your heart to beat faster (aerobic exercise) and strength training. Work with your doctor to design a program that works for you.  ? Medicine to help you lose weight. This may be used if you are not able to lose 1 pound a week after 6 weeks of healthy eating and more exercise.  ? Treating conditions that cause the obesity.  ? Surgery. Options may include gastric banding and gastric bypass. This  may be done if:  § Other treatments have not helped to improve your condition.  § You have a BMI of 40 or higher.  § You have life-threatening health problems related to obesity.  Follow these instructions at home:  Eating and drinking    · Follow advice from your doctor about what to eat and drink. Your doctor may tell you to:  ? Limit fast food, sweets, and processed snack foods.  ? Choose low-fat options. For example, choose low-fat milk instead of whole milk.  ? Eat 5 or more servings of fruits or vegetables each day.  ? Eat at home more often. This gives you more control over what you eat.  ? Choose healthy foods when you eat out.  ? Learn to read food labels. This will help you learn how much food is in 1 serving.  ? Keep low-fat snacks available.  ? Avoid drinks that have a lot of sugar in them. These include soda, fruit juice, iced tea with sugar, and flavored milk.  · Drink enough water to keep your pee (urine) pale yellow.  · Do not go on fad diets.  Physical activity  · Exercise often, as told by your doctor. Most adults should get up to 150 minutes of moderate-intensity exercise every week.Ask your doctor:  ? What types of exercise are safe for you.  ? How often you should exercise.  · Warm up and stretch before being active.  · Do slow stretching after being active (cool down).  · Rest between times of being active.  Lifestyle  · Work with your doctor and a food expert (dietitian) to set a weight-loss goal that is best for you.  · Limit your screen time.  · Find ways to reward yourself that do not involve food.  · Do not drink alcohol if:  ? Your doctor tells you not to drink.  ? You are pregnant, may be pregnant, or are planning to become pregnant.  · If you drink alcohol:  ? Limit how much you use to:  § 0-1 drink a day for women.  § 0-2 drinks a day for men.  ? Be aware of how much alcohol is in your drink. In the [place] one drink equals one 12 oz bottle of beer (355 mL), one 5 oz glass of wine (148  mL), or one 1½ oz glass of hard liquor (44 mL).  General instructions  · Keep a weight-loss journal. This can help you keep track of:  ? The food that you eat.  ? How much exercise you get.  · Take over-the-counter and prescription medicines only as told by your doctor.  · Take vitamins and supplements only as told by your doctor.  · Think about joining a support group.  · Keep all follow-up visits as told by your doctor. This is important.  Contact a doctor if:  · You cannot meet your weight loss goal after you have changed your diet and lifestyle for 6 weeks.  Get help right away if you:  · Are having trouble breathing.  · Are having thoughts of harming yourself.  Summary  · Obesity is having too much body fat.  · Being obese means that your weight is more than what is healthy for you.  · Work with your doctor to set a weight-loss goal.  · Get regular exercise as told by your doctor.  This information is not intended to replace advice given to you by your health care provider. Make sure you discuss any questions you have with your health care provider.  Document Revised: 08/22/2019 Document Reviewed: 08/22/2019  [employer] Patient Education © 2020 [employer]  Steps to Quit Smoking  Smoking tobacco is the leading cause of preventable death. It can affect almost every organ in the body. Smoking puts you and people around you at risk for many serious, long-lasting (chronic) diseases. Quitting smoking can be hard, but it is one of the best things that you can do for your health. It is never too late to quit.  How do I get ready to quit?  When you decide to quit smoking, make a plan to help you succeed. Before you quit:  · Pick a date to quit. Set a date within the next 2 weeks to give you time to prepare.  · Write down the reasons why you are quitting. Keep this list in places where you will see it often.  · Tell your family, friends, and co-workers that you are quitting. Their support is important.  · Talk with your  doctor about the choices that may help you quit.  · Find out if your health insurance will pay for these treatments.  · Know the people, places, things, and activities that make you want to smoke (triggers). Avoid them.  What first steps can I take to quit smoking?  · Throw away all cigarettes at home, at work, and in your car.  · Throw away the things that you use when you smoke, such as ashtrays and lighters.  · Clean your car. Make sure to empty the ashtray.  · Clean your home, including curtains and carpets.  What can I do to help me quit smoking?  Talk with your doctor about taking medicines and seeing a counselor at the same time. You are more likely to succeed when you do both.  · If you are pregnant or breastfeeding, talk with your doctor about counseling or other ways to quit smoking. Do not take medicine to help you quit smoking unless your doctor tells you to do so.  To quit smoking:  Quit right away  · Quit smoking totally, instead of slowly cutting back on how much you smoke over a period of time.  · Go to counseling. You are more likely to quit if you go to counseling sessions regularly.  Take medicine  You may take medicines to help you quit. Some medicines need a prescription, and some you can buy over-the-counter. Some medicines may contain a drug called nicotine to replace the nicotine in cigarettes. Medicines may:  · Help you to stop having the desire to smoke (cravings).  · Help to stop the problems that come when you stop smoking (withdrawal symptoms).  Your doctor may ask you to use:  · Nicotine patches, gum, or lozenges.  · Nicotine inhalers or sprays.  · Non-nicotine medicine that is taken by mouth.  Find resources  Find resources and other ways to help you quit smoking and remain smoke-free after you quit. These resources are most helpful when you use them often. They include:  · Online chats with a counselor.  · Phone quitlines.  · Printed self-help materials.  · Support groups or group  counseling.  · Text messaging programs.  · Mobile phone apps. Use apps on your mobile phone or tablet that can help you stick to your quit plan. There are many free apps for mobile phones and tablets as well as websites. Examples include Quit Guide from the CDC and smokefree.gov    What things can I do to make it easier to quit?    · Talk to your family and friends. Ask them to support and encourage you.  · Call a phone quitline (1-800-QUIT-NOW), reach out to support groups, or work with a counselor.  · Ask people who smoke to not smoke around you.  · Avoid places that make you want to smoke, such as:  ? Bars.  ? Parties.  ? Smoke-break areas at work.  · Spend time with people who do not smoke.  · Lower the stress in your life. Stress can make you want to smoke. Try these things to help your stress:  ? Getting regular exercise.  ? Doing deep-breathing exercises.  ? Doing yoga.  ? Meditating.  ? Doing a body scan. To do this, close your eyes, focus on one area of your body at a time from head to toe. Notice which parts of your body are tense. Try to relax the muscles in those areas.  How will I feel when I quit smoking?  Day 1 to 3 weeks  Within the first 24 hours, you may start to have some problems that come from quitting tobacco. These problems are very bad 2-3 days after you quit, but they do not often last for more than 2-3 weeks. You may get these symptoms:  · Mood swings.  · Feeling restless, nervous, angry, or annoyed.  · Trouble concentrating.  · Dizziness.  · Strong desire for high-sugar foods and nicotine.  · Weight gain.  · Trouble pooping (constipation).  · Feeling like you may vomit (nausea).  · Coughing or a sore throat.  · Changes in how the medicines that you take for other issues work in your body.  · Depression.  · Trouble sleeping (insomnia).  Week 3 and afterward  After the first 2-3 weeks of quitting, you may start to notice more positive results, such as:  · Better sense of smell and  taste.  · Less coughing and sore throat.  · Slower heart rate.  · Lower blood pressure.  · Clearer skin.  · Better breathing.  · Fewer sick days.  Quitting smoking can be hard. Do not give up if you fail the first time. Some people need to try a few times before they succeed. Do your best to stick to your quit plan, and talk with your doctor if you have any questions or concerns.  Summary  · Smoking tobacco is the leading cause of preventable death. Quitting smoking can be hard, but it is one of the best things that you can do for your health.  · When you decide to quit smoking, make a plan to help you succeed.  · Quit smoking right away, not slowly over a period of time.  · When you start quitting, seek help from your doctor, family, or friends.  This information is not intended to replace advice given to you by your health care provider. Make sure you discuss any questions you have with your health care provider.  Document Revised: 09/11/2020 Document Reviewed: 03/07/2020  [email or web address] Patient Education © 2020 [email or web address]  Health Risks of Smoking  Smoking cigarettes is very bad for your health. Tobacco smoke has over 200 known poisons in it. It contains the poisonous gases nitrogen oxide and carbon monoxide. There are over 60 chemicals in tobacco smoke that cause cancer.  Smoking is difficult to quit because a chemical in tobacco, called nicotine, causes addiction or dependence. When you smoke and inhale, nicotine is absorbed rapidly into the bloodstream through your lungs. Both inhaled and non-inhaled nicotine may be addictive.  What are the risks of cigarette smoke?  Cigarette smokers have an increased risk of many serious medical problems, including:  · Lung cancer.  · Lung disease, such as pneumonia, bronchitis, and emphysema.  · Chest pain (angina) and heart attack because the heart is not getting enough oxygen.  · Heart disease and peripheral blood vessel disease.  · High blood pressure  (hypertension).  · Stroke.  · Oral cancer, including cancer of the lip, mouth, or voice box.  · Bladder cancer.  · Pancreatic cancer.  · Cervical cancer.  · Pregnancy complications, including premature birth.  · Stillbirths and smaller  babies, birth defects, and genetic damage to sperm.  · Early menopause.  · Lower estrogen level for women.  · Infertility.  · Facial wrinkles.  · Blindness.  · Increased risk of broken bones (fractures).  · Senile dementia.  · Stomach ulcers and internal bleeding.  · Delayed wound healing and increased risk of complications during surgery.  · Even smoking lightly shortens your life expectancy by several years.  Because of secondhand smoke exposure, children of smokers have an increased risk of the following:  · Sudden infant death syndrome (SIDS).  · Respiratory infections.  · Lung cancer.  · Heart disease.  · Ear infections.  What are the benefits of quitting?  There are many health benefits of quitting smoking. Here are some of them:  · Within days of quitting smoking, your risk of having a heart attack decreases, your blood flow improves, and your lung capacity improves. Blood pressure, pulse rate, and breathing patterns start returning to normal soon after quitting.  · Within months, your lungs may clear up completely.  · Quitting for 10 years reduces your risk of developing lung cancer and heart disease to almost that of a nonsmoker.  · People who quit may see an improvement in their overall quality of life.  How do I quit smoking?         Smoking is an addiction with both physical and psychological effects, and longtime habits can be hard to change. Your health care provider can recommend:  · Programs and community resources, which may include group support, education, or talk therapy.  · Prescription medicines to help reduce cravings.  · Nicotine replacement products, such as patches, gum, and nasal sprays. Use these products only as directed. Do not replace cigarette  smoking with electronic cigarettes, which are commonly called e-cigarettes. The safety of e-cigarettes is not known, and some may contain harmful chemicals.  · A combination of two or more of these methods.  Where to find more information  · American Lung Association: www.lung.org  · American Cancer Society: www.cancer.org  Summary  · Smoking cigarettes is very bad for your health. Cigarette smokers have an increased risk of many serious medical problems, including several cancers, heart disease, and stroke.  · Smoking is an addiction with both physical and psychological effects, and longtime habits can be hard to change.  · By stopping right away, you can greatly reduce the risk of medical problems for you and your family.  · To help you quit smoking, your health care provider can recommend programs, community resources, prescription medicines, and nicotine replacement products such as patches, gum, and nasal sprays.  This information is not intended to replace advice given to you by your health care provider. Make sure you discuss any questions you have with your health care provider.  Document Revised: 03/21/2019 Document Reviewed: 12/22/2017  Elsevier Patient Education © 2020 [employer] Inc.

## 2021-01-08 NOTE — PROGRESS NOTES
Subjective:     Encounter Date: 01/11/2021      Patient ID: Akua Aguilar is a 60 y.o. female   HPI: Her video visit today is for routine follow up of medication adjustments. HCTZ was increased to 50 mg daily, she was started on Lipitor 10 mg daily, and clonidine was changed to 0.1 mg twice daily as needed at her last office visit on 12/09/20 . She reports continued high blood pressures. She complains of insomnia and increased fatigue over the last 3 months. Patient has a history of nonobstructive coronary artery disease, hypertension, hyperlipidemia, left ventricular hypertrophy, enlargement of abdominal aorta, GERD, and smoking. US of aorta 8/14/2020 at [facility] revealed no evidence of AAA. Patient denies chest pain, shortness of breath, dizziness, syncope, orthopnea, PND, edema, and decreased stamina. Patient denies signs of bleeding.    Chief Complaint: routine follow up  Hypertension  This is a chronic problem. The current episode started more than 1 year ago. The problem is uncontrolled. Associated symptoms include malaise/fatigue. Pertinent negatives include no anxiety, blurred vision, chest pain, headaches, neck pain, orthopnea, palpitations, peripheral edema, PND, shortness of breath or sweats. There are no associated agents to hypertension. Risk factors for coronary artery disease include dyslipidemia, post-menopausal state and obesity. Current antihypertension treatment includes alpha 1 blockers, diuretics, calcium channel blockers, beta blockers and ACE inhibitors. There is no history of angina, kidney disease, CAD/MI, CVA, heart failure, left ventricular hypertrophy, PVD or retinopathy. There is no history of chronic renal disease, coarctation of the aorta, hyperaldosteronism, hypercortisolism, hyperparathyroidism, a hypertension causing med, pheochromocytoma, renovascular disease, sleep apnea or a thyroid problem.   Hyperlipidemia  This is a chronic problem. The current episode started  more than 1 year ago. The problem is uncontrolled. Recent lipid tests were reviewed and are high. Exacerbating diseases include obesity. She has no history of chronic renal disease, diabetes, hypothyroidism, liver disease or nephrotic syndrome. Pertinent negatives include no chest pain, focal sensory loss, focal weakness, leg pain, myalgias or shortness of breath. She is currently on no antihyperlipidemic treatment. Risk factors for coronary artery disease include dyslipidemia, hypertension and post-menopausal.       Previous Cardiac Testing:  Results for orders placed during the hospital encounter of 08/08/19   Adult Transthoracic Echo Complete W/ Cont if Necessary Per Protocol    Narrative · Estimated EF = 60%.  · Left ventricular diastolic dysfunction.  · Left ventricular wall thickness is consistent with mild concentric   hypertrophy.  · No evidence of pulmonary hypertension is present.        Results for orders placed during the hospital encounter of 06/19/20   Cardiac Catheterization/Vascular Study    Narrative Cardiac Catheterization Operative Report    Patient was referred for cardiac catheterization .   Indications for the procedure include: Symptoms suggestive of angina with   high suspicion for significant obstructive coronary artery disease   With ongoing chest pain    Procedure performed    Coronary angiography  Left heart catheterization  Left ventriculography  Fractional flow reserve of the left anterior descending coronary artery  Supervision of the administration of moderate sedation    Fractional flow reserve of left anterior descending coronary artery using   Ikari 3.56 Albanian guide  Usual protocol was followed.  Adenosine was used to the or hyperemia.    Guide used as mentioned above.  The guide was advanced and the ostium of the vessel was engaged.  We   zeroed the status post aortic pressure then we advanced a fractional flow   reserve wire.  This was equalized prior to crossing the lesion.   Then we   crossed the lesion.  Anticoagulation was used prior to insertion of the   wire.  After crossing the lesion adenosine was used.  This caused   hyperemia.  Fractional flow reserve was then performed and documented as   noted.  End of case the wire was removed removed angiography was performed   before and after removing the wire to document no complication and decide   results were achieved.       Procedure Details  The risks, benefits, complications, treatment options, and expected   outcomes were discussed with the patient. Plan is for moderate sedation,   and the patient agrees to proceed with the procedure.  An immediate   assessment was done prior to the administration of moderate sedation.     The patient and/or family concurred with the proposed plan, giving   informed consent. Patient was brought to the cath lab after IV hydration   was begun and oral premedication was given. The was further sedated with   Fentanyl  and Versed.    The patient was prepped and draped in the usual manner. Using the modified   Seldinger access technique, a 6f Seneg[place]e sheath was placed in the right   radial  artery.   A left heart catheterization was done. Angiograms were also done.        Cardiac Catheterization Operative Report      Patient was referred for cardiac catheterization: High suspicion for   coronary artery disease    Procedure Details  The risks, benefits, complications, treatment options, and expected   outcomes were discussed with the patient. Plan is for moderate sedation,   and the patient agrees to proceed with the procedure.  An immediate   assessment was done prior to the administration of moderate sedation.    The patient and/or family concurred with the proposed plan, giving   informed consent. Patient was brought to the cath lab after IV hydration   was begun and oral premedication was given.     The skin overlying the patient's right radial artery was prepped and   draped in the usual sterile  fashion.  Timeout was taken to confirm the   correct patient and procedure.  Lidocaine was administered for local   anesthesia.  IV Versed and fentanyl were used to achieve conscious   sedation.  Modified Seldinger technique was then used to place a 6 Surinamese   sheath in the right radial artery    Diagnostic coronary angiography was performed with Tig coronary catheter.    Coronary angiogram were performed in Iraqi and JIMÉNEZ projection to evaluate   the coronary arterial systems.      A TR band device was used to achieve hemostasis at the end of the   procedure.  The patient tolerated the procedure well, and there were no   immediate complications.    Procedural Details: After written and informed consent was obtained, the   patient was brought to the cath lab in a fasting state.       1. Selective coronary angiography:    Left main coronary artery:  The left main coronary artery arises from the   left coronary cusp and bifurcates into the  left anterior descending   coronary artery  and left circumflex arteries.      Left anterior descending artery:  The  left anterior descending coronary   artery   arises normally from the left main coronary artery and courses in   the anterior interventricular groove and terminates at the apex.  50%   stenosis noted of the proximal left anterior descending coronary artery   with large circumferential soft atherosclerotic plaque  Normal fractional flow reserve of 0.9 or higher    Left circumflex:  The left circumflex arises form the left man artery and   supplies obtuse marginal branches.  Minor atherosclerotic plaque noted in   the mid left circumflex coronary artery which is dominant and large    Right coronary artery:  The right coronary artery  arises normally from   the right coronary cusp and is dominant for the posterior circulation.    The right coronary artery  is non-dominant and normal.    2. Left heart cath: Moderately elevated left ventricular end-diastolic   pressure of  24 mmHg.    3. LV Gram: Normal left ventricular ejection fraction of 55% with no   regional wall motion abnormalities.    4. Interventions: None    Estimated Blood Loss:  Minimal    Specimens: None         Complications:  None; patient tolerated the procedure well.           Disposition: Cardiovascular observation unit           Condition: stable            I supervised the administration of conscious sedation by nursing staff   throughout the case.  First dose was given at 1119 and the end of my   face-to-face encounter was at 1145 Hours.    During the case, continuous pulse oximetry, heart rate, blood pressure,   and patient status were monitored.       Risk, Benefits, and Alternatives discussed with the patient and/or family.    Plan is for moderate sedation, and the patient agrees to proceed with the   procedure.  An immediate assessment was done prior to the administration   of moderate sedation      Conclusion    50% stenosis of proximal left anterior descending coronary artery with a   normal fractional flow reserve 0.9 or higher with a large circumferential   plaque burden for which aggressive medical therapy is advised  Large dominant left circumflex coronary artery with minor atherosclerotic   plaque in the midsegment without any significant lesions  Right coronary artery is nondominant small and normal  Moderately elevated left ventricular end-diastolic pressure of 24 mmHg  Normal left ventricular ejection fraction of 55% without any discrete wall   motion abnormalities and no significant mitral regurgitation        Plan    Keep LDL well below 70 mg/dL  Treat for elevated left ventricular end-diastolic pressure  Can be discharged home today after period of observation  Hydration   Observation  Risk factor modifications           Abnormal coronary CT angiography with 25 to 49% stenosis of the left   anterior descending coronary artery         The following portions of the patient's history were reviewed  and updated as appropriate: allergies, current medications, past family history, past medical history, past social history, past surgical history and problem list.    Allergies   Allergen Reactions   • Aspirin GI Intolerance     Nausea and vomitting   • Latex Rash     Blisters with tape and intolerance with gloves       Current Outpatient Medications:   •  albuterol sulfate  (90 Base) MCG/ACT inhaler, Inhale 2 puffs Every 6 (Six) Hours As Needed., Disp: , Rfl:   •  amLODIPine (NORVASC) 10 MG tablet, Take 1 tablet by mouth Daily., Disp: 90 tablet, Rfl: 4  •  ARIPiprazole (ABILIFY) 10 MG tablet, Take 10 mg by mouth every night at bedtime., Disp: , Rfl:   •  atorvastatin (LIPITOR) 10 MG tablet, Take 1 tablet by mouth Daily., Disp: 30 tablet, Rfl: 11  •  Blood Pressure Monitoring (SPHYGMOMANOMETER) misc, 1 Units 2 (Two) Times a Day., Disp: 1 each, Rfl: 0  •  carvedilol (COREG) 25 MG tablet, Take 1 tablet by mouth 2 (Two) Times a Day., Disp: 180 tablet, Rfl: 3  •  clonazePAM (KlonoPIN) 0.5 MG tablet, Take 0.5 mg by mouth 2 (Two) Times a Day., Disp: , Rfl:   •  cloNIDine (CATAPRES) 0.1 MG tablet, Take 1 tablet by mouth 2 (Two) Times a Day As Needed for High Blood Pressure., Disp: 180 tablet, Rfl: 3  •  cloNIDine (CATAPRES-TTS) 0.3 MG/24HR patch, Place 1 patch on the skin as directed by provider 1 (One) Time Per Week., Disp: 12 patch, Rfl: 3  •  doxepin (SINEquan) 25 MG capsule, 1 CAPSULE BY MOUTH AT BEDTIME FILL ON OR AFTER 1/4/2018, Disp: , Rfl: 1  •  DULoxetine (CYMBALTA) 60 MG capsule, Take 60 mg by mouth 2 (Two) Times a Day., Disp: , Rfl:   •  gabapentin (NEURONTIN) 300 MG capsule, Take 300 mg by mouth 2 (Two) Times a Day., Disp: , Rfl:   •  gabapentin (NEURONTIN) 600 MG tablet, Take 900 mg by mouth Every Night., Disp: , Rfl:   •  hydroCHLOROthiazide (HYDRODIURIL) 50 MG tablet, Take 1 tablet by mouth Daily., Disp: 90 tablet, Rfl: 3  •  HYDROcodone-acetaminophen (NORCO)  MG per tablet, Take 1 tablet by  mouth Every 6 (Six) Hours., Disp: , Rfl:   •  hydrOXYzine pamoate (VISTARIL) 25 MG capsule, Take 25 mg by mouth Every 8 (Eight) Hours As Needed., Disp: , Rfl:   •  levETIRAcetam (KEPPRA) 500 MG tablet, Take 500 mg by mouth 2 (Two) Times a Day., Disp: , Rfl:   •  lisinopril (PRINIVIL,ZESTRIL) 40 MG tablet, TAKE 1 TABLET BY MOUTH TWICE A DAY, Disp: 60 tablet, Rfl: 7  •  nitroglycerin (NITROSTAT) 0.4 MG SL tablet, Take no more than 3 doses in 15 minutes., Disp: 30 tablet, Rfl: 3  •  SUMAtriptan (IMITREX) 50 MG tablet, 1 (One) Time As Needed., Disp: , Rfl:   •  vitamin D (ERGOCALCIFEROL) 07233 units capsule capsule, Take 50,000 Units by mouth Every 7 (Seven) Days., Disp: , Rfl:   •  citalopram (CeleXA) 40 MG tablet, Take 40 mg by mouth Daily., Disp: , Rfl:   •  hydrALAZINE (APRESOLINE) 10 MG tablet, Take 1 tablet by mouth 3 (Three) Times a Day., Disp: 90 tablet, Rfl: 11  Past Medical History:   Diagnosis Date   • Arthritis    • Cancer (CMS/HCC)     CREVICAL, UTERINE   • COPD (chronic obstructive pulmonary disease) (CMS/HCC)    • Fatty liver    • GERD (gastroesophageal reflux disease)    • History of transfusion    • Hypertension    • Kidney stone    • Low back pain potentially associated with spinal stenosis    • Lupus (CMS/HCC)    • Migraines    • Scoliosis    • Shortness of breath    • UTI (urinary tract infection)      Past Surgical History:   Procedure Laterality Date   • BACK SURGERY      lumbar   • CARDIAC CATHETERIZATION     • CARDIAC CATHETERIZATION N/A 2020    Procedure: Cardiac Catheterization/Vascular Study Radial cath;  Surgeon: Robert Padilla MD;  Location: [facility] CATH INVASIVE LOCATION;  Service: Cardiology;  Laterality: N/A;   • CARDIAC DEFIBRILLATOR PLACEMENT     •  SECTION     • CHOLECYSTECTOMY     • COLON RESECTION       or    • COLONOSCOPY     • COLONOSCOPY N/A 5/10/2018    Procedure: COLONOSCOPY WITH ANESTHESIA;  Surgeon: Shawna Valladares MD;  Location: [facility] ENDOSCOPY;   Service: Gastroenterology   • ENDOSCOPY N/A 5/10/2018    Procedure: ESOPHAGOGASTRODUODENOSCOPY WITH ANESTHESIA;  Surgeon: Shawna Valladares MD;  Location: [facility] ENDOSCOPY;  Service: Gastroenterology   • EYE SURGERY      x 2   • HYSTERECTOMY     • LEG SURGERY Right     bars/bolts placed   • UPPER GASTROINTESTINAL ENDOSCOPY     • URETEROSCOPY LASER LITHOTRIPSY WITH STENT INSERTION Right 2018    Procedure: URETEROSCOPY  WITH STENT INSERTION RETROGRADE PYELOGRAM;  Surgeon: Tyrell Hardin MD;  Location: [facility] OR;  Service: Urology     Family History   Problem Relation Age of Onset   • Cancer Mother    • Heart disease Mother    • Heart disease Father    • Lupus Sister    • Heart disease Sister    • Heart disease Sister    • Lupus Sister    • Hypokalemia Sister    • Alcohol abuse Sister    • Colon cancer Neg Hx    • Colon polyps Neg Hx      Social History     Socioeconomic History   • Marital status:      Spouse name: Not on file   • Number of children: Not on file   • Years of education: Not on file   • Highest education level: Not on file   Tobacco Use   • Smoking status: Current Every Day Smoker     Packs/day: 1.00     Years: 40.00     Pack years: 40.00     Types: Cigarettes   • Smokeless tobacco: Never Used   • Tobacco comment: started smoking more since son    Substance and Sexual Activity   • Alcohol use: Not Currently     Comment: quit    • Drug use: Never   • Sexual activity: Defer       Review of Systems   Constitution: Positive for malaise/fatigue. Negative for chills, diaphoresis, weight gain and weight loss.   Eyes: Negative for blurred vision.   Cardiovascular: Negative for chest pain, claudication, cyanosis, dyspnea on exertion, irregular heartbeat, leg swelling, near-syncope, orthopnea, palpitations, paroxysmal nocturnal dyspnea and syncope.   Respiratory: Negative for shortness of breath.    Hematologic/Lymphatic: Negative for bleeding problem. Does not bruise/bleed easily.  "  Skin: Negative for dry skin and rash.   Musculoskeletal: Negative for myalgias, neck pain and stiffness.   Gastrointestinal: Negative for bloating, abdominal pain, heartburn, nausea and vomiting.   Neurological: Negative for focal weakness, headaches, light-headedness, numbness and weakness.   Psychiatric/Behavioral: The patient has insomnia.               Objective:     Physical Exam      Procedures  BP (!) 134/112   Pulse 78   Ht 160 cm (63\")   Wt 77.1 kg (170 lb)   BMI 30.11 kg/m²   Lab Review:   Lab Results   Component Value Date    WBC 8.80 06/19/2020    HGB 16.6 (H) 06/19/2020    HCT 51.4 (H) 06/19/2020    MCV 88.9 06/19/2020     06/19/2020     Lab Results   Component Value Date    GLUCOSE 90 06/19/2020    BUN 16 06/19/2020    CREATININE 0.58 06/19/2020    EGFRIFNONA 106 06/19/2020    BCR 27.6 (H) 06/19/2020    K 4.3 06/19/2020    CO2 26.0 06/19/2020    CALCIUM 9.8 06/19/2020    ALBUMIN 4.50 06/19/2020    AST 15 06/19/2020    ALT 15 06/19/2020     Lab Results   Component Value Date    CHOL 186 10/26/2017    CHLPL 232 (H) 04/19/2016     Lab Results   Component Value Date    TRIG 185 (H) 10/26/2017    TRIG 267 (H) 04/19/2016     Lab Results   Component Value Date    HDL 55 10/26/2017    HDL 57 04/19/2016     Lab Results   Component Value Date     (H) 10/26/2017     (H) 04/19/2016       I have reviewed the most recent lab results.       Assessment:          Diagnosis Plan   1. Nonobstructive atherosclerosis of coronary artery  CTA coronary arteries 5/18/2020 - calcium score 253.57  Cath 6/19/2020 - nonobstructive disease   2. Essential hypertension  Blood pressures remain elevated. Start hydralazine 10 mg three times daily.     3. Mixed hyperlipidemia  Not currently on statin. Start Lipitor 10 mg daily.    4. Enlargement of abdominal aorta (CMS/HCC) small infra renal  US aorta 8/14/2020 - no evidence of AAA   5. LVH (left ventricular hypertrophy) mild     6. Gastroesophageal reflux " disease without esophagitis  Managed by PCP.   7. Lupus (CMS/HCC) Follows with rheumatology.    8. Tobacco abuse  Akua Aguilar  reports that she has been smoking cigarettes. She has a 40.00 pack-year smoking history. She has never used smokeless tobacco.. I have educated her on the risk of diseases from using tobacco products such as cancer, COPD and heart diease.     I advised her to quit and she is not willing to quit.    I spent 3  minutes counseling the patient.          9. Class 1 obesity due to excess calories with serious comorbidity and body mass index (BMI) of 31.0 to 31.9 in adult Patient's Body mass index is 30.11 kg/m². BMI is above normal parameters. Recommendations include: educational material.            Plan:       1. Start hydralazine 10 mg by mouth three times daily. Continue other medications as previously prescribed.  2. Report any worsening symptoms.  3. Report any signs of bleeding.  4. Continue heart healthy diet and regular exercise as tolerated.   5. Follow up with PCP for blood pressure and cholesterol management and routine lab work.  6.  Monitor and record daily blood pressure. Report readings consistently higher than 140/90 or consistently lower than 100/60.   7. Follow up in one month, or sooner if needed.

## 2021-01-11 ENCOUNTER — TELEPHONE (OUTPATIENT)
Dept: CARDIOLOGY | Facility: CLINIC | Age: 61
End: 2021-01-11

## 2021-01-11 ENCOUNTER — TELEMEDICINE (OUTPATIENT)
Dept: CARDIOLOGY | Facility: CLINIC | Age: 61
End: 2021-01-11

## 2021-01-11 VITALS
HEART RATE: 78 BPM | BODY MASS INDEX: 30.12 KG/M2 | SYSTOLIC BLOOD PRESSURE: 134 MMHG | HEIGHT: 63 IN | DIASTOLIC BLOOD PRESSURE: 112 MMHG | WEIGHT: 170 LBS

## 2021-01-11 DIAGNOSIS — I10 ESSENTIAL HYPERTENSION: ICD-10-CM

## 2021-01-11 DIAGNOSIS — K21.9 GASTROESOPHAGEAL REFLUX DISEASE WITHOUT ESOPHAGITIS: ICD-10-CM

## 2021-01-11 DIAGNOSIS — I51.7 LVH (LEFT VENTRICULAR HYPERTROPHY): ICD-10-CM

## 2021-01-11 DIAGNOSIS — M32.9 LUPUS (HCC): ICD-10-CM

## 2021-01-11 DIAGNOSIS — I77.89 ENLARGEMENT OF ABDOMINAL AORTA (HCC): ICD-10-CM

## 2021-01-11 DIAGNOSIS — E66.09 CLASS 1 OBESITY DUE TO EXCESS CALORIES WITH SERIOUS COMORBIDITY AND BODY MASS INDEX (BMI) OF 31.0 TO 31.9 IN ADULT: ICD-10-CM

## 2021-01-11 DIAGNOSIS — Z72.0 TOBACCO ABUSE: ICD-10-CM

## 2021-01-11 DIAGNOSIS — I25.10 NONOBSTRUCTIVE ATHEROSCLEROSIS OF CORONARY ARTERY: Primary | ICD-10-CM

## 2021-01-11 DIAGNOSIS — E78.2 MIXED HYPERLIPIDEMIA: ICD-10-CM

## 2021-01-11 PROCEDURE — 99214 OFFICE O/P EST MOD 30 MIN: CPT | Performed by: NURSE PRACTITIONER

## 2021-01-11 RX ORDER — HYDRALAZINE HYDROCHLORIDE 10 MG/1
10 TABLET, FILM COATED ORAL 3 TIMES DAILY
Qty: 90 TABLET | Refills: 11 | Status: SHIPPED | OUTPATIENT
Start: 2021-01-11 | End: 2021-02-24 | Stop reason: SDUPTHER

## 2021-01-11 NOTE — TELEPHONE ENCOUNTER
Notified pt of new RX. Directions were given and was told to monitor and F/U with increased readings. She was in understanding.

## 2021-01-11 NOTE — TELEPHONE ENCOUNTER
----- Message from DAVID Muñoz sent at 1/11/2021  2:44 PM CST -----  Regarding: Hydralazine  Please let this pt know that I have sent in a prescription for hydralazine 10 mg three times daily to her pharmacy.  Monitor and record daily blood pressure. Report readings consistently higher than 140/90 or consistently lower than 100/60. We will see her for follow up in one month.

## 2021-01-14 ENCOUNTER — TRANSCRIBE ORDERS (OUTPATIENT)
Dept: ADMINISTRATIVE | Facility: HOSPITAL | Age: 61
End: 2021-01-14

## 2021-01-14 DIAGNOSIS — Z01.818 PREOP TESTING: Primary | ICD-10-CM

## 2021-01-14 DIAGNOSIS — Z11.59 SCREENING FOR VIRAL DISEASE: Primary | ICD-10-CM

## 2021-01-15 ENCOUNTER — LAB (OUTPATIENT)
Dept: LAB | Facility: HOSPITAL | Age: 61
End: 2021-01-15

## 2021-01-15 ENCOUNTER — APPOINTMENT (OUTPATIENT)
Dept: LAB | Facility: HOSPITAL | Age: 61
End: 2021-01-15

## 2021-01-15 LAB — SARS-COV-2 ORF1AB RESP QL NAA+PROBE: NOT DETECTED

## 2021-01-15 PROCEDURE — C9803 HOPD COVID-19 SPEC COLLECT: HCPCS | Performed by: PAIN MEDICINE

## 2021-01-15 PROCEDURE — U0004 COV-19 TEST NON-CDC HGH THRU: HCPCS | Performed by: PAIN MEDICINE

## 2021-02-24 ENCOUNTER — PREP FOR SURGERY (OUTPATIENT)
Dept: OTHER | Facility: HOSPITAL | Age: 61
End: 2021-02-24

## 2021-02-24 ENCOUNTER — OFFICE VISIT (OUTPATIENT)
Dept: CARDIOLOGY | Facility: CLINIC | Age: 61
End: 2021-02-24

## 2021-02-24 ENCOUNTER — LAB (OUTPATIENT)
Dept: LAB | Facility: HOSPITAL | Age: 61
End: 2021-02-24

## 2021-02-24 VITALS
OXYGEN SATURATION: 98 % | HEIGHT: 63 IN | BODY MASS INDEX: 34.02 KG/M2 | DIASTOLIC BLOOD PRESSURE: 100 MMHG | HEART RATE: 92 BPM | WEIGHT: 192 LBS | SYSTOLIC BLOOD PRESSURE: 132 MMHG

## 2021-02-24 DIAGNOSIS — E78.2 MIXED HYPERLIPIDEMIA: Primary | ICD-10-CM

## 2021-02-24 DIAGNOSIS — K21.9 GASTROESOPHAGEAL REFLUX DISEASE WITHOUT ESOPHAGITIS: ICD-10-CM

## 2021-02-24 DIAGNOSIS — E66.09 CLASS 1 OBESITY DUE TO EXCESS CALORIES WITH SERIOUS COMORBIDITY AND BODY MASS INDEX (BMI) OF 31.0 TO 31.9 IN ADULT: ICD-10-CM

## 2021-02-24 DIAGNOSIS — I77.89 ENLARGEMENT OF ABDOMINAL AORTA (HCC): ICD-10-CM

## 2021-02-24 DIAGNOSIS — M32.9 LUPUS (HCC): ICD-10-CM

## 2021-02-24 DIAGNOSIS — I20.0 UNSTABLE ANGINA (HCC): Primary | ICD-10-CM

## 2021-02-24 DIAGNOSIS — I51.7 LVH (LEFT VENTRICULAR HYPERTROPHY): ICD-10-CM

## 2021-02-24 DIAGNOSIS — R06.02 SHORTNESS OF BREATH: ICD-10-CM

## 2021-02-24 DIAGNOSIS — E78.2 MIXED HYPERLIPIDEMIA: ICD-10-CM

## 2021-02-24 DIAGNOSIS — I10 ESSENTIAL HYPERTENSION: ICD-10-CM

## 2021-02-24 DIAGNOSIS — Z72.0 TOBACCO ABUSE: ICD-10-CM

## 2021-02-24 DIAGNOSIS — I25.10 NONOBSTRUCTIVE ATHEROSCLEROSIS OF CORONARY ARTERY: Primary | ICD-10-CM

## 2021-02-24 LAB
ALBUMIN SERPL-MCNC: 4.2 G/DL (ref 3.5–5.2)
ALBUMIN/GLOB SERPL: 1.5 G/DL
ALP SERPL-CCNC: 81 U/L (ref 39–117)
ALT SERPL W P-5'-P-CCNC: 13 U/L (ref 1–33)
ANION GAP SERPL CALCULATED.3IONS-SCNC: 9 MMOL/L (ref 5–15)
AST SERPL-CCNC: 13 U/L (ref 1–32)
BILIRUB SERPL-MCNC: 0.2 MG/DL (ref 0–1.2)
BUN SERPL-MCNC: 20 MG/DL (ref 8–23)
BUN/CREAT SERPL: 38.5 (ref 7–25)
CALCIUM SPEC-SCNC: 9.5 MG/DL (ref 8.6–10.5)
CHLORIDE SERPL-SCNC: 103 MMOL/L (ref 98–107)
CHOLEST SERPL-MCNC: 165 MG/DL (ref 0–200)
CO2 SERPL-SCNC: 30 MMOL/L (ref 22–29)
CREAT SERPL-MCNC: 0.52 MG/DL (ref 0.57–1)
D DIMER PPP FEU-MCNC: 0.45 MG/L (FEU) (ref 0–0.5)
ERYTHROCYTE [SEDIMENTATION RATE] IN BLOOD: 6 MM/HR (ref 0–20)
GFR SERPL CREATININE-BSD FRML MDRD: 120 ML/MIN/1.73
GLOBULIN UR ELPH-MCNC: 2.8 GM/DL
GLUCOSE SERPL-MCNC: 84 MG/DL (ref 65–99)
HDLC SERPL-MCNC: 60 MG/DL (ref 40–60)
LDLC SERPL CALC-MCNC: 68 MG/DL (ref 0–100)
LDLC/HDLC SERPL: 0.99 {RATIO}
NT-PROBNP SERPL-MCNC: 37.8 PG/ML (ref 0–900)
POTASSIUM SERPL-SCNC: 4 MMOL/L (ref 3.5–5.2)
PROT SERPL-MCNC: 7 G/DL (ref 6–8.5)
SODIUM SERPL-SCNC: 142 MMOL/L (ref 136–145)
TRIGL SERPL-MCNC: 227 MG/DL (ref 0–150)
VLDLC SERPL-MCNC: 37 MG/DL (ref 5–40)

## 2021-02-24 PROCEDURE — 80053 COMPREHEN METABOLIC PANEL: CPT

## 2021-02-24 PROCEDURE — 83880 ASSAY OF NATRIURETIC PEPTIDE: CPT

## 2021-02-24 PROCEDURE — 99215 OFFICE O/P EST HI 40 MIN: CPT | Performed by: NURSE PRACTITIONER

## 2021-02-24 PROCEDURE — 86141 C-REACTIVE PROTEIN HS: CPT

## 2021-02-24 PROCEDURE — 93000 ELECTROCARDIOGRAM COMPLETE: CPT | Performed by: NURSE PRACTITIONER

## 2021-02-24 PROCEDURE — 85379 FIBRIN DEGRADATION QUANT: CPT

## 2021-02-24 PROCEDURE — 80061 LIPID PANEL: CPT

## 2021-02-24 PROCEDURE — 36415 COLL VENOUS BLD VENIPUNCTURE: CPT

## 2021-02-24 PROCEDURE — 85651 RBC SED RATE NONAUTOMATED: CPT

## 2021-02-24 RX ORDER — SODIUM CHLORIDE 9 MG/ML
1-3 INJECTION, SOLUTION INTRAVENOUS CONTINUOUS
Status: CANCELLED | OUTPATIENT
Start: 2021-02-24

## 2021-02-24 RX ORDER — SODIUM CHLORIDE 0.9 % (FLUSH) 0.9 %
10 SYRINGE (ML) INJECTION AS NEEDED
Status: CANCELLED | OUTPATIENT
Start: 2021-02-24

## 2021-02-24 RX ORDER — ONDANSETRON 2 MG/ML
4 INJECTION INTRAMUSCULAR; INTRAVENOUS EVERY 6 HOURS PRN
Status: CANCELLED | OUTPATIENT
Start: 2021-02-24

## 2021-02-24 RX ORDER — SODIUM CHLORIDE 0.9 % (FLUSH) 0.9 %
3 SYRINGE (ML) INJECTION EVERY 12 HOURS SCHEDULED
Status: CANCELLED | OUTPATIENT
Start: 2021-02-24

## 2021-02-24 RX ORDER — DIPHENHYDRAMINE HCL 50 MG
50 CAPSULE ORAL ONCE
Status: CANCELLED | OUTPATIENT
Start: 2021-02-24

## 2021-02-24 RX ORDER — HYDRALAZINE HYDROCHLORIDE 25 MG/1
25 TABLET, FILM COATED ORAL 3 TIMES DAILY
Qty: 270 TABLET | Refills: 3 | Status: SHIPPED | OUTPATIENT
Start: 2021-02-24 | End: 2022-09-06

## 2021-02-24 NOTE — PATIENT INSTRUCTIONS

## 2021-02-24 NOTE — PROGRESS NOTES
Subjective:     Encounter Date: 02/24/2021      Patient ID: Akua Aguilar is a 60 y.o. female   HPI: Her visit today is for routine follow up of medication adjustments.  She was started on hydralazine 10 mg 3 times daily during her last video visit on 1/11/2021 for uncontrolled hypertension. She reports continued high blood pressures. She complains of insomnia and increased fatigue over the last 3 months. Patient has a history of nonobstructive coronary artery disease, hypertension, hyperlipidemia, left ventricular hypertrophy, enlargement of abdominal aorta, GERD, and smoking. US of aorta 8/14/2020 at [facility] revealed no evidence of AAA. She complains of a two week history of intermittent, non-exertional, non-radiating substernal pressure. The pain is relieved with nitroglycerin. She complains of a one week history of abdominal distention, bilateral lower extremity edema and increased shortness of breath. She reports chronic orthopnea. Patient denies palpitations,  dizziness, syncope, orthopnea, PND and decreased stamina. Patient denies signs of bleeding.    Chief Complaint: routine follow up  Hypertension  This is a chronic problem. The current episode started more than 1 year ago. The problem is uncontrolled. Associated symptoms include chest pain, malaise/fatigue, orthopnea and shortness of breath. Pertinent negatives include no anxiety, blurred vision, headaches, neck pain, palpitations, peripheral edema, PND or sweats. There are no associated agents to hypertension. Risk factors for coronary artery disease include dyslipidemia, post-menopausal state and obesity. Current antihypertension treatment includes alpha 1 blockers, diuretics, calcium channel blockers, beta blockers and ACE inhibitors. There is no history of angina, kidney disease, CAD/MI, CVA, heart failure, left ventricular hypertrophy, PVD or retinopathy. There is no history of chronic renal disease, coarctation of the aorta,  hyperaldosteronism, hypercortisolism, hyperparathyroidism, a hypertension causing med, pheochromocytoma, renovascular disease, sleep apnea or a thyroid problem.   Hyperlipidemia  This is a chronic problem. The current episode started more than 1 year ago. The problem is uncontrolled. Recent lipid tests were reviewed and are high. Exacerbating diseases include obesity. She has no history of chronic renal disease, diabetes, hypothyroidism, liver disease or nephrotic syndrome. Associated symptoms include chest pain and shortness of breath. Pertinent negatives include no focal sensory loss, focal weakness, leg pain or myalgias. She is currently on no antihyperlipidemic treatment. Risk factors for coronary artery disease include dyslipidemia, hypertension and post-menopausal.       Previous Cardiac Testing:  Results for orders placed during the hospital encounter of 08/08/19   Adult Transthoracic Echo Complete W/ Cont if Necessary Per Protocol    Narrative · Estimated EF = 60%.  · Left ventricular diastolic dysfunction.  · Left ventricular wall thickness is consistent with mild concentric   hypertrophy.  · No evidence of pulmonary hypertension is present.        Results for orders placed during the hospital encounter of 06/19/20   Cardiac Catheterization/Vascular Study    Narrative Cardiac Catheterization Operative Report    Patient was referred for cardiac catheterization .   Indications for the procedure include: Symptoms suggestive of angina with   high suspicion for significant obstructive coronary artery disease   With ongoing chest pain    Procedure performed    Coronary angiography  Left heart catheterization  Left ventriculography  Fractional flow reserve of the left anterior descending coronary artery  Supervision of the administration of moderate sedation    Fractional flow reserve of left anterior descending coronary artery using   Ikari 3.56 Thai guide  Usual protocol was followed.  Adenosine was used to  the or hyperemia.    Guide used as mentioned above.  The guide was advanced and the ostium of the vessel was engaged.  We   zeroed the status post aortic pressure then we advanced a fractional flow   reserve wire.  This was equalized prior to crossing the lesion.  Then we   crossed the lesion.  Anticoagulation was used prior to insertion of the   wire.  After crossing the lesion adenosine was used.  This caused   hyperemia.  Fractional flow reserve was then performed and documented as   noted.  End of case the wire was removed removed angiography was performed   before and after removing the wire to document no complication and decide   results were achieved.       Procedure Details  The risks, benefits, complications, treatment options, and expected   outcomes were discussed with the patient. Plan is for moderate sedation,   and the patient agrees to proceed with the procedure.  An immediate   assessment was done prior to the administration of moderate sedation.     The patient and/or family concurred with the proposed plan, giving   informed consent. Patient was brought to the cath lab after IV hydration   was begun and oral premedication was given. The was further sedated with   Fentanyl  and Versed.    The patient was prepped and draped in the usual manner. Using the modified   Seldinger access technique, a 6f Kiswahili sheath was placed in the right   radial  artery.   A left heart catheterization was done. Angiograms were also done.        Cardiac Catheterization Operative Report      Patient was referred for cardiac catheterization: High suspicion for   coronary artery disease    Procedure Details  The risks, benefits, complications, treatment options, and expected   outcomes were discussed with the patient. Plan is for moderate sedation,   and the patient agrees to proceed with the procedure.  An immediate   assessment was done prior to the administration of moderate sedation.    The patient and/or family  concurred with the proposed plan, giving   informed consent. Patient was brought to the cath lab after IV hydration   was begun and oral premedication was given.     The skin overlying the patient's right radial artery was prepped and   draped in the usual sterile fashion.  Timeout was taken to confirm the   correct patient and procedure.  Lidocaine was administered for local   anesthesia.  IV Versed and fentanyl were used to achieve conscious   sedation.  Modified Seldinger technique was then used to place a 6 Fijian   sheath in the right radial artery    Diagnostic coronary angiography was performed with Tig coronary catheter.    Coronary angiogram were performed in [place] and JIMÉNEZ projection to evaluate   the coronary arterial systems.      A TR band device was used to achieve hemostasis at the end of the   procedure.  The patient tolerated the procedure well, and there were no   immediate complications.    Procedural Details: After written and informed consent was obtained, the   patient was brought to the cath lab in a fasting state.       1. Selective coronary angiography:    Left main coronary artery:  The left main coronary artery arises from the   left coronary cusp and bifurcates into the  left anterior descending   coronary artery  and left circumflex arteries.      Left anterior descending artery:  The  left anterior descending coronary   artery   arises normally from the left main coronary artery and courses in   the anterior interventricular groove and terminates at the apex.  50%   stenosis noted of the proximal left anterior descending coronary artery   with large circumferential soft atherosclerotic plaque  Normal fractional flow reserve of 0.9 or higher    Left circumflex:  The left circumflex arises form the left man artery and   supplies obtuse marginal branches.  Minor atherosclerotic plaque noted in   the mid left circumflex coronary artery which is dominant and large    Right coronary artery:  The  right coronary artery  arises normally from   the right coronary cusp and is dominant for the posterior circulation.    The right coronary artery  is non-dominant and normal.    2. Left heart cath: Moderately elevated left ventricular end-diastolic   pressure of 24 mmHg.    3. LV Gram: Normal left ventricular ejection fraction of 55% with no   regional wall motion abnormalities.    4. Interventions: None    Estimated Blood Loss:  Minimal    Specimens: None         Complications:  None; patient tolerated the procedure well.           Disposition: Cardiovascular observation unit           Condition: stable            I supervised the administration of conscious sedation by nursing staff   throughout the case.  First dose was given at 1119 and the end of my   face-to-face encounter was at 1145 Hours.    During the case, continuous pulse oximetry, heart rate, blood pressure,   and patient status were monitored.       Risk, Benefits, and Alternatives discussed with the patient and/or family.    Plan is for moderate sedation, and the patient agrees to proceed with the   procedure.  An immediate assessment was done prior to the administration   of moderate sedation      Conclusion    50% stenosis of proximal left anterior descending coronary artery with a   normal fractional flow reserve 0.9 or higher with a large circumferential   plaque burden for which aggressive medical therapy is advised  Large dominant left circumflex coronary artery with minor atherosclerotic   plaque in the midsegment without any significant lesions  Right coronary artery is nondominant small and normal  Moderately elevated left ventricular end-diastolic pressure of 24 mmHg  Normal left ventricular ejection fraction of 55% without any discrete wall   motion abnormalities and no significant mitral regurgitation        Plan    Keep LDL well below 70 mg/dL  Treat for elevated left ventricular end-diastolic pressure  Can be discharged home today after  period of observation  Hydration   Observation  Risk factor modifications           Abnormal coronary CT angiography with 25 to 49% stenosis of the left   anterior descending coronary artery         The following portions of the patient's history were reviewed and updated as appropriate: allergies, current medications, past family history, past medical history, past social history, past surgical history and problem list.    Allergies   Allergen Reactions   • Aspirin GI Intolerance     Nausea and vomitting   • Latex Rash     Blisters with tape and intolerance with gloves       Current Outpatient Medications:   •  albuterol sulfate  (90 Base) MCG/ACT inhaler, Inhale 2 puffs Every 6 (Six) Hours As Needed., Disp: , Rfl:   •  amLODIPine (NORVASC) 10 MG tablet, Take 1 tablet by mouth Daily., Disp: 90 tablet, Rfl: 4  •  ARIPiprazole (ABILIFY) 10 MG tablet, Take 10 mg by mouth every night at bedtime., Disp: , Rfl:   •  atorvastatin (LIPITOR) 10 MG tablet, Take 1 tablet by mouth Daily., Disp: 30 tablet, Rfl: 11  •  Blood Pressure Monitoring (SPHYGMOMANOMETER) misc, 1 Units 2 (Two) Times a Day., Disp: 1 each, Rfl: 0  •  carvedilol (COREG) 25 MG tablet, Take 1 tablet by mouth 2 (Two) Times a Day., Disp: 180 tablet, Rfl: 3  •  clonazePAM (KlonoPIN) 0.5 MG tablet, Take 0.5 mg by mouth 2 (Two) Times a Day., Disp: , Rfl:   •  cloNIDine (CATAPRES) 0.1 MG tablet, Take 1 tablet by mouth 2 (Two) Times a Day As Needed for High Blood Pressure., Disp: 180 tablet, Rfl: 3  •  cloNIDine (CATAPRES-TTS) 0.3 MG/24HR patch, Place 1 patch on the skin as directed by provider 1 (One) Time Per Week., Disp: 12 patch, Rfl: 3  •  doxepin (SINEquan) 25 MG capsule, 1 CAPSULE BY MOUTH AT BEDTIME FILL ON OR AFTER 1/4/2018, Disp: , Rfl: 1  •  gabapentin (NEURONTIN) 300 MG capsule, Take 300 mg by mouth Daily., Disp: , Rfl:   •  gabapentin (NEURONTIN) 600 MG tablet, Take 600 mg by mouth Daily., Disp: , Rfl:   •  hydrALAZINE (APRESOLINE) 25 MG tablet,  Take 1 tablet by mouth 3 (Three) Times a Day., Disp: 270 tablet, Rfl: 3  •  hydroCHLOROthiazide (HYDRODIURIL) 50 MG tablet, Take 1 tablet by mouth Daily., Disp: 90 tablet, Rfl: 3  •  HYDROcodone-acetaminophen (NORCO)  MG per tablet, Take 1 tablet by mouth 3 (Three) Times a Day., Disp: , Rfl:   •  hydrOXYzine pamoate (VISTARIL) 25 MG capsule, Take 25 mg by mouth Every 8 (Eight) Hours As Needed., Disp: , Rfl:   •  levETIRAcetam (KEPPRA) 500 MG tablet, Take 500 mg by mouth 2 (Two) Times a Day., Disp: , Rfl:   •  lisinopril (PRINIVIL,ZESTRIL) 40 MG tablet, TAKE 1 TABLET BY MOUTH TWICE A DAY, Disp: 60 tablet, Rfl: 7  •  nitroglycerin (NITROSTAT) 0.4 MG SL tablet, Take no more than 3 doses in 15 minutes., Disp: 30 tablet, Rfl: 3  •  SUMAtriptan (IMITREX) 50 MG tablet, 1 (One) Time As Needed., Disp: , Rfl:   •  vitamin D (ERGOCALCIFEROL) 49376 units capsule capsule, Take 50,000 Units by mouth Every 7 (Seven) Days., Disp: , Rfl:   Past Medical History:   Diagnosis Date   • Arthritis    • Cancer (CMS/HCC)     CREVICAL, UTERINE   • COPD (chronic obstructive pulmonary disease) (CMS/HCC)    • Fatty liver    • GERD (gastroesophageal reflux disease)    • History of transfusion    • Hypertension    • Kidney stone    • Low back pain potentially associated with spinal stenosis    • Lupus (CMS/HCC)    • Migraines    • Scoliosis    • Shortness of breath    • UTI (urinary tract infection)      Past Surgical History:   Procedure Laterality Date   • BACK SURGERY      lumbar   • CARDIAC CATHETERIZATION     • CARDIAC CATHETERIZATION N/A 2020    Procedure: Cardiac Catheterization/Vascular Study Radial cath;  Surgeon: Robert Padilla MD;  Location: [facility] CATH INVASIVE LOCATION;  Service: Cardiology;  Laterality: N/A;   • CARDIAC DEFIBRILLATOR PLACEMENT     •  SECTION     • CHOLECYSTECTOMY     • COLON RESECTION       or    • COLONOSCOPY     • COLONOSCOPY N/A 5/10/2018    Procedure: COLONOSCOPY WITH ANESTHESIA;   Surgeon: Shawna Valladares MD;  Location: [facility] ENDOSCOPY;  Service: Gastroenterology   • ENDOSCOPY N/A 5/10/2018    Procedure: ESOPHAGOGASTRODUODENOSCOPY WITH ANESTHESIA;  Surgeon: Shawna Valladares MD;  Location: [facility] ENDOSCOPY;  Service: Gastroenterology   • EYE SURGERY      x 2   • HYSTERECTOMY     • LEG SURGERY Right     bars/bolts placed   • UPPER GASTROINTESTINAL ENDOSCOPY     • URETEROSCOPY LASER LITHOTRIPSY WITH STENT INSERTION Right 2018    Procedure: URETEROSCOPY  WITH STENT INSERTION RETROGRADE PYELOGRAM;  Surgeon: Tyrell Hardin MD;  Location: [facility] OR;  Service: Urology     Family History   Problem Relation Age of Onset   • Cancer Mother    • Heart disease Mother    • Heart disease Father    • Lupus Sister    • Heart disease Sister    • Heart disease Sister    • Lupus Sister    • Hypokalemia Sister    • Alcohol abuse Sister    • Colon cancer Neg Hx    • Colon polyps Neg Hx      Social History     Socioeconomic History   • Marital status:      Spouse name: Not on file   • Number of children: Not on file   • Years of education: Not on file   • Highest education level: Not on file   Tobacco Use   • Smoking status: Current Every Day Smoker     Packs/day: 1.00     Years: 40.00     Pack years: 40.00     Types: Cigarettes   • Smokeless tobacco: Never Used   • Tobacco comment: started smoking more since son    Substance and Sexual Activity   • Alcohol use: Not Currently     Comment: quit    • Drug use: Never   • Sexual activity: Defer       Review of Systems   Constitution: Positive for malaise/fatigue and weight gain. Negative for chills, decreased appetite, diaphoresis, fever, night sweats and weight loss.   HENT: Negative for nosebleeds.    Eyes: Negative for blurred vision and visual disturbance.   Cardiovascular: Positive for chest pain, leg swelling and orthopnea. Negative for claudication, cyanosis, dyspnea on exertion, irregular heartbeat, near-syncope, palpitations,  paroxysmal nocturnal dyspnea and syncope.   Respiratory: Positive for shortness of breath. Negative for cough, hemoptysis, snoring and wheezing.    Endocrine: Negative for cold intolerance and heat intolerance.   Hematologic/Lymphatic: Negative for bleeding problem. Does not bruise/bleed easily.   Skin: Negative for dry skin and rash.   Musculoskeletal: Negative for back pain, falls, myalgias, neck pain and stiffness.   Gastrointestinal: Positive for bloating. Negative for abdominal pain, change in bowel habit, constipation, diarrhea, dysphagia, heartburn, nausea and vomiting.   Genitourinary: Negative for hematuria.   Neurological: Negative for dizziness, focal weakness, headaches, light-headedness, numbness and weakness.   Psychiatric/Behavioral: Negative for altered mental status. The patient has insomnia.    Allergic/Immunologic: Negative for persistent infections.              Objective:     Vitals signs and nursing note reviewed.   Constitutional:       General: Not in acute distress.     Appearance: Normal and healthy appearance. Well-developed, normal weight and not in distress. Not diaphoretic.   Eyes:      General: Lids are normal.         Right eye: No discharge.         Left eye: No discharge.      Conjunctiva/sclera: Conjunctivae normal.      Pupils: Pupils are equal, round, and reactive to light.   HENT:      Head: Normocephalic and atraumatic.      Jaw: There is normal jaw occlusion.      Right Ear: External ear normal.      Left Ear: External ear normal.      Nose: Nose normal.   Neck:      Musculoskeletal: Normal range of motion and neck supple.      Thyroid: No thyromegaly.      Vascular: JVD present. No carotid bruit or JVR. JVD normal.      Trachea: Trachea normal. No tracheal deviation.   Pulmonary:      Effort: Pulmonary effort is normal. No respiratory distress.      Breath sounds: Examination of the right-lower field reveals decreased breath sounds. Examination of the left-lower field reveals  "decreased breath sounds. Decreased breath sounds present. No wheezing. No rhonchi. No rales.   Chest:      Chest wall: Not tender to palpatation.   Cardiovascular:      PMI at left midclavicular line. Normal rate. Regular rhythm. Normal S1. Normal S2.      Murmurs: There is no murmur.      No gallop. No click. No rub.   Pulses:     Intact distal pulses. No decreased pulses.   Edema:     Peripheral edema present.  Abdominal:      General: Bowel sounds are normal. There is distension.      Palpations: Abdomen is soft.      Tenderness: There is no abdominal tenderness.   Musculoskeletal: Normal range of motion.         General: No tenderness or deformity.   Skin:     General: Skin is warm and dry.      Coloration: Skin is not pale.      Findings: No erythema or rash.   Neurological:      General: No focal deficit present.      Mental Status: Alert, oriented to person, place, and time and oriented to person, place and time.   Psychiatric:         Attention and Perception: Attention and perception normal.         Mood and Affect: Mood and affect normal.         Speech: Speech normal.         Behavior: Behavior normal.         Thought Content: Thought content normal.         Cognition and Memory: Cognition and memory normal.         Judgment: Judgment normal.             ECG 12 Lead    Date/Time: 2/24/2021 2:13 PM  Performed by: Anel Amaya APRN  Authorized by: Anel Amaya APRN   Comparison: compared with previous ECG from 8/27/2019  Rhythm: sinus rhythm  Rate: normal  BPM: 92  ST Depression: II, III, aVF, V5, V6 and V4    Clinical impression: abnormal EKG          /100   Pulse 92   Ht 160 cm (63\")   Wt 87.1 kg (192 lb)   SpO2 98%   BMI 34.01 kg/m²   Lab Review:   Lab Results   Component Value Date    WBC 8.80 06/19/2020    HGB 16.6 (H) 06/19/2020    HCT 51.4 (H) 06/19/2020    MCV 88.9 06/19/2020     06/19/2020     Lab Results   Component Value Date    GLUCOSE 90 06/19/2020    BUN 16 " 06/19/2020    CREATININE 0.58 06/19/2020    EGFRIFNONA 106 06/19/2020    BCR 27.6 (H) 06/19/2020    K 4.3 06/19/2020    CO2 26.0 06/19/2020    CALCIUM 9.8 06/19/2020    ALBUMIN 4.50 06/19/2020    AST 15 06/19/2020    ALT 15 06/19/2020     Lab Results   Component Value Date    CHOL 186 10/26/2017    CHLPL 232 (H) 04/19/2016     Lab Results   Component Value Date    TRIG 185 (H) 10/26/2017    TRIG 267 (H) 04/19/2016     Lab Results   Component Value Date    HDL 55 10/26/2017    HDL 57 04/19/2016     Lab Results   Component Value Date     (H) 10/26/2017     (H) 04/19/2016       I have reviewed the most recent lab results.       Assessment:          Diagnosis Plan   1. Nonobstructive atherosclerosis of coronary artery  50% mid-LAD stenosis on [facility] 6/20. Pt to be scheduled for left heart catheterization.    2. Essential hypertension  Blood pressures remain elevated. Increase hydralazine to 25 mg three times daily.    3. Mixed hyperlipidemia  Recently started on Lipitor.  Recheck lipid panel in April 2021.   4. Enlargement of abdominal aorta (CMS/HCC) small infra renal  US aorta 8/14/2020 - no evidence of AAA   5. LVH (left ventricular hypertrophy) mild     6. Gastroesophageal reflux disease without esophagitis  Managed by PCP.   7. Lupus (CMS/HCC) Follows with rheumatology. Check Sed rate and CRP.    8. Tobacco abuse  Akua Aguilar  reports that she has been smoking cigarettes. She has a 40.00 pack-year smoking history. She has never used smokeless tobacco.. I have educated her on the risk of diseases from using tobacco products such as cancer, COPD and heart diease.     I advised her to quit and she is not willing to quit.    I spent 3  minutes counseling the patient.          9. Class 1 obesity due to excess calories with serious comorbidity and body mass index (BMI) of 31.0 to 31.9 in adult Patient's Body mass index is 34.01 kg/m². BMI is above normal parameters. Recommendations include:  educational material.     10. Shortness of breath Check 2D echo. Check CTA of chest. CMP.           Plan:       1. Increase hydralazine to 25 mg three times daily. Continue other medications as previously prescribed.  2. Report any worsening symptoms.  3. Report any signs of bleeding.  4. Continue heart healthy diet and regular exercise as tolerated.   5. Follow up with PCP for blood pressure and cholesterol management and routine lab work.  6.  Monitor and record daily blood pressure. Report readings consistently higher than 140/90 or consistently lower than 100/60.   7. Follow up in two weeks or sooner if needed.  8. Discussed with Dr. Padilla. Pt to be scheduled for left heart catheterization. Check Sed rate, CRP, CMP and BNP. Check 2d echo and CTA of chest.

## 2021-02-25 ENCOUNTER — HOSPITAL ENCOUNTER (OUTPATIENT)
Dept: CT IMAGING | Facility: HOSPITAL | Age: 61
Discharge: HOME | End: 2021-02-25

## 2021-02-25 ENCOUNTER — TRANSCRIBE ORDERS (OUTPATIENT)
Dept: LAB | Facility: HOSPITAL | Age: 61
End: 2021-02-25

## 2021-02-25 ENCOUNTER — HOSPITAL ENCOUNTER (OUTPATIENT)
Dept: CARDIOLOGY | Facility: HOSPITAL | Age: 61
Discharge: HOME | End: 2021-02-25

## 2021-02-25 VITALS
BODY MASS INDEX: 34.02 KG/M2 | HEIGHT: 63 IN | DIASTOLIC BLOOD PRESSURE: 100 MMHG | SYSTOLIC BLOOD PRESSURE: 132 MMHG | WEIGHT: 192 LBS

## 2021-02-25 DIAGNOSIS — Z01.818 PREOP TESTING: Primary | ICD-10-CM

## 2021-02-25 DIAGNOSIS — R06.02 SHORTNESS OF BREATH: ICD-10-CM

## 2021-02-25 DIAGNOSIS — R07.2 PRECORDIAL CHEST PAIN: Primary | ICD-10-CM

## 2021-02-25 LAB
CREAT BLDA-MCNC: 0.6 MG/DL (ref 0.6–1.3)
CRP SERPL-MCNC: 0.43 MG/DL (ref 0.01–0.5)

## 2021-02-25 PROCEDURE — 82565 ASSAY OF CREATININE: CPT

## 2021-02-25 PROCEDURE — 0 IOPAMIDOL PER 1 ML: Performed by: NURSE PRACTITIONER

## 2021-02-25 PROCEDURE — 93356 MYOCRD STRAIN IMG SPCKL TRCK: CPT

## 2021-02-25 PROCEDURE — 71275 CT ANGIOGRAPHY CHEST: CPT

## 2021-02-25 PROCEDURE — 93356 MYOCRD STRAIN IMG SPCKL TRCK: CPT | Performed by: INTERNAL MEDICINE

## 2021-02-25 PROCEDURE — 93306 TTE W/DOPPLER COMPLETE: CPT

## 2021-02-25 PROCEDURE — 93306 TTE W/DOPPLER COMPLETE: CPT | Performed by: INTERNAL MEDICINE

## 2021-02-25 PROCEDURE — 25010000002 PERFLUTREN 6.52 MG/ML SUSPENSION: Performed by: INTERNAL MEDICINE

## 2021-02-25 RX ADMIN — IOPAMIDOL 100 ML: 755 INJECTION, SOLUTION INTRAVENOUS at 11:28

## 2021-02-25 RX ADMIN — PERFLUTREN 8.48 MG: 6.52 INJECTION, SUSPENSION INTRAVENOUS at 11:59

## 2021-02-27 LAB
BH CV ECHO MEAS - AO MAX PG (FULL): 3.1 MMHG
BH CV ECHO MEAS - AO MAX PG: 4.8 MMHG
BH CV ECHO MEAS - AO MEAN PG (FULL): 2 MMHG
BH CV ECHO MEAS - AO MEAN PG: 3 MMHG
BH CV ECHO MEAS - AO ROOT AREA (BSA CORRECTED): 1.9
BH CV ECHO MEAS - AO ROOT AREA: 10.8 CM^2
BH CV ECHO MEAS - AO ROOT DIAM: 3.7 CM
BH CV ECHO MEAS - AO V2 MAX: 109 CM/SEC
BH CV ECHO MEAS - AO V2 MEAN: 77.1 CM/SEC
BH CV ECHO MEAS - AO V2 VTI: 25.2 CM
BH CV ECHO MEAS - AVA(I,A): 1.6 CM^2
BH CV ECHO MEAS - AVA(I,D): 1.6 CM^2
BH CV ECHO MEAS - AVA(V,A): 1.9 CM^2
BH CV ECHO MEAS - AVA(V,D): 1.9 CM^2
BH CV ECHO MEAS - BSA(HAYCOCK): 2 M^2
BH CV ECHO MEAS - BSA: 1.9 M^2
BH CV ECHO MEAS - BZI_BMI: 34 KILOGRAMS/M^2
BH CV ECHO MEAS - BZI_METRIC_HEIGHT: 160 CM
BH CV ECHO MEAS - BZI_METRIC_WEIGHT: 87.1 KG
BH CV ECHO MEAS - EDV(CUBED): 96.7 ML
BH CV ECHO MEAS - EDV(MOD-SP4): 94.5 ML
BH CV ECHO MEAS - EDV(TEICH): 96.8 ML
BH CV ECHO MEAS - EF(CUBED): 69.2 %
BH CV ECHO MEAS - EF(MOD-SP4): 61.4 %
BH CV ECHO MEAS - EF(TEICH): 60.8 %
BH CV ECHO MEAS - ESV(CUBED): 29.8 ML
BH CV ECHO MEAS - ESV(MOD-SP4): 36.5 ML
BH CV ECHO MEAS - ESV(TEICH): 37.9 ML
BH CV ECHO MEAS - FS: 32.5 %
BH CV ECHO MEAS - IVS/LVPW: 1.2
BH CV ECHO MEAS - IVSD: 1.3 CM
BH CV ECHO MEAS - LA DIMENSION: 3.8 CM
BH CV ECHO MEAS - LA/AO: 1
BH CV ECHO MEAS - LAT PEAK E' VEL: 8.3 CM/SEC
BH CV ECHO MEAS - LV DIASTOLIC VOL/BSA (35-75): 49.7 ML/M^2
BH CV ECHO MEAS - LV MASS(C)D: 199.4 GRAMS
BH CV ECHO MEAS - LV MASS(C)DI: 104.9 GRAMS/M^2
BH CV ECHO MEAS - LV MAX PG: 1.7 MMHG
BH CV ECHO MEAS - LV MEAN PG: 1 MMHG
BH CV ECHO MEAS - LV SYSTOLIC VOL/BSA (12-30): 19.2 ML/M^2
BH CV ECHO MEAS - LV V1 MAX: 64.3 CM/SEC
BH CV ECHO MEAS - LV V1 MEAN: 42.9 CM/SEC
BH CV ECHO MEAS - LV V1 VTI: 13.1 CM
BH CV ECHO MEAS - LVIDD: 4.6 CM
BH CV ECHO MEAS - LVIDS: 3.1 CM
BH CV ECHO MEAS - LVLD AP4: 7.8 CM
BH CV ECHO MEAS - LVLS AP4: 6.3 CM
BH CV ECHO MEAS - LVOT AREA (M): 3.1 CM^2
BH CV ECHO MEAS - LVOT AREA: 3.1 CM^2
BH CV ECHO MEAS - LVOT DIAM: 2 CM
BH CV ECHO MEAS - LVPWD: 1.2 CM
BH CV ECHO MEAS - MED PEAK E' VEL: 5 CM/SEC
BH CV ECHO MEAS - MV A MAX VEL: 57.2 CM/SEC
BH CV ECHO MEAS - MV DEC SLOPE: 344.5 CM/SEC^2
BH CV ECHO MEAS - MV DEC TIME: 0.2 SEC
BH CV ECHO MEAS - MV E MAX VEL: 86.3 CM/SEC
BH CV ECHO MEAS - MV E/A: 1.5
BH CV ECHO MEAS - MV P1/2T MAX VEL: 91.2 CM/SEC
BH CV ECHO MEAS - MV P1/2T: 77.5 MSEC
BH CV ECHO MEAS - MVA P1/2T LCG: 2.4 CM^2
BH CV ECHO MEAS - MVA(P1/2T): 2.8 CM^2
BH CV ECHO MEAS - PA MAX PG: 2.2 MMHG
BH CV ECHO MEAS - PA V2 MAX: 74.7 CM/SEC
BH CV ECHO MEAS - RAP SYSTOLE: 5 MMHG
BH CV ECHO MEAS - RVSP: 41.7 MMHG
BH CV ECHO MEAS - SI(AO): 142.6 ML/M^2
BH CV ECHO MEAS - SI(CUBED): 35.2 ML/M^2
BH CV ECHO MEAS - SI(LVOT): 21.7 ML/M^2
BH CV ECHO MEAS - SI(MOD-SP4): 30.5 ML/M^2
BH CV ECHO MEAS - SI(TEICH): 31 ML/M^2
BH CV ECHO MEAS - SV(AO): 271 ML
BH CV ECHO MEAS - SV(CUBED): 66.9 ML
BH CV ECHO MEAS - SV(LVOT): 41.2 ML
BH CV ECHO MEAS - SV(MOD-SP4): 58 ML
BH CV ECHO MEAS - SV(TEICH): 58.9 ML
BH CV ECHO MEAS - TR MAX VEL: 303 CM/SEC
BH CV ECHO MEASUREMENTS AVERAGE E/E' RATIO: 12.98
LEFT ATRIUM VOLUME INDEX: 23 ML/M2
MAXIMAL PREDICTED HEART RATE: 160 BPM
STRESS TARGET HR: 136 BPM

## 2021-03-01 ENCOUNTER — APPOINTMENT (OUTPATIENT)
Dept: LAB | Facility: HOSPITAL | Age: 61
End: 2021-03-01

## 2021-03-02 DIAGNOSIS — R07.2 PRECORDIAL CHEST PAIN: Primary | ICD-10-CM

## 2021-03-11 ENCOUNTER — BULK ORDERING (OUTPATIENT)
Dept: CASE MANAGEMENT | Facility: OTHER | Age: 61
End: 2021-03-11

## 2021-03-11 ENCOUNTER — OFFICE VISIT (OUTPATIENT)
Dept: CARDIOLOGY | Facility: CLINIC | Age: 61
End: 2021-03-11

## 2021-03-11 VITALS
SYSTOLIC BLOOD PRESSURE: 138 MMHG | WEIGHT: 197 LBS | BODY MASS INDEX: 34.91 KG/M2 | OXYGEN SATURATION: 98 % | DIASTOLIC BLOOD PRESSURE: 88 MMHG | HEIGHT: 63 IN | HEART RATE: 74 BPM

## 2021-03-11 DIAGNOSIS — E66.09 CLASS 1 OBESITY DUE TO EXCESS CALORIES WITH SERIOUS COMORBIDITY AND BODY MASS INDEX (BMI) OF 34.0 TO 34.9 IN ADULT: ICD-10-CM

## 2021-03-11 DIAGNOSIS — K21.9 GASTROESOPHAGEAL REFLUX DISEASE, UNSPECIFIED WHETHER ESOPHAGITIS PRESENT: ICD-10-CM

## 2021-03-11 DIAGNOSIS — I77.89 ENLARGEMENT OF ABDOMINAL AORTA (HCC): ICD-10-CM

## 2021-03-11 DIAGNOSIS — Z23 IMMUNIZATION DUE: ICD-10-CM

## 2021-03-11 DIAGNOSIS — E78.2 MIXED HYPERLIPIDEMIA: ICD-10-CM

## 2021-03-11 DIAGNOSIS — M32.9 LUPUS (HCC): ICD-10-CM

## 2021-03-11 DIAGNOSIS — I10 ESSENTIAL HYPERTENSION: ICD-10-CM

## 2021-03-11 DIAGNOSIS — Z72.0 TOBACCO ABUSE: ICD-10-CM

## 2021-03-11 DIAGNOSIS — I25.10 NONOBSTRUCTIVE ATHEROSCLEROSIS OF CORONARY ARTERY: Primary | ICD-10-CM

## 2021-03-11 DIAGNOSIS — I51.7 LVH (LEFT VENTRICULAR HYPERTROPHY): ICD-10-CM

## 2021-03-11 PROCEDURE — 99214 OFFICE O/P EST MOD 30 MIN: CPT | Performed by: NURSE PRACTITIONER

## 2021-03-11 NOTE — PROGRESS NOTES
Subjective:     Encounter Date: 03/11/2021      Patient ID: Akua Aguilar is a 60 y.o. female   HPI: Her visit today is for routine follow up of medication adjustments and outpatient testing. Hydralazine was increased to 25 mg 3 times daily at her last office visit on 2/24/2021 for uncontrolled hypertension.  CMP, sed rate, CRP, D-dimer and BNP were normal.  2D echo on 2/25/2021 revealed mild concentric hypertrophy with normal left ventricular systolic function and ejection fraction 56 to 60%, indeterminate left ventricular diastolic function, mild pulmonary hypertension and abnormal global longitudinal LV strain of -12%.  CT angiogram of the chest revealed no evidence of pulmonary embolism with signs of emphysema and coronary artery calcifications.  She is scheduled for nuclear stress test on 3/15/2021.  She reports improvement in blood pressures. She complains of insomnia and increased fatigue over the last 3 months. Patient has a history of nonobstructive coronary artery disease, hypertension, hyperlipidemia, left ventricular hypertrophy, enlargement of abdominal aorta, GERD, and smoking. US of aorta 8/14/2020 at [facility] revealed no evidence of AAA. She complains of a 1 month history of intermittent, non-exertional, non-radiating substernal pressure. The pain is relieved with nitroglycerin. She complains of a 3 week history of abdominal distention, bilateral lower extremity edema and increased shortness of breath. She reports chronic orthopnea. Patient denies palpitations,  dizziness, syncope, orthopnea, PND and decreased stamina. Patient denies signs of bleeding.    Chief Complaint: routine follow up  Hypertension  This is a chronic problem. The current episode started more than 1 year ago. The problem is uncontrolled. Associated symptoms include chest pain, malaise/fatigue, orthopnea and shortness of breath. Pertinent negatives include no anxiety, blurred vision, headaches, neck pain,  palpitations, peripheral edema, PND or sweats. There are no associated agents to hypertension. Risk factors for coronary artery disease include dyslipidemia, post-menopausal state and obesity. Current antihypertension treatment includes alpha 1 blockers, diuretics, calcium channel blockers, beta blockers and ACE inhibitors. There is no history of angina, kidney disease, CAD/MI, CVA, heart failure, left ventricular hypertrophy, PVD or retinopathy. There is no history of chronic renal disease, coarctation of the aorta, hyperaldosteronism, hypercortisolism, hyperparathyroidism, a hypertension causing med, pheochromocytoma, renovascular disease, sleep apnea or a thyroid problem.   Hyperlipidemia  This is a chronic problem. The current episode started more than 1 year ago. The problem is uncontrolled. Recent lipid tests were reviewed and are high. Exacerbating diseases include obesity. She has no history of chronic renal disease, diabetes, hypothyroidism, liver disease or nephrotic syndrome. Associated symptoms include chest pain and shortness of breath. Pertinent negatives include no focal sensory loss, focal weakness, leg pain or myalgias. She is currently on no antihyperlipidemic treatment. Risk factors for coronary artery disease include dyslipidemia, hypertension and post-menopausal.       Previous Cardiac Testing:  Results for orders placed during the hospital encounter of 02/25/21    Adult Transthoracic Echo Complete w/ Color, Spectral and Contrast if necessary per protocol    Interpretation Summary  · Left ventricular wall thickness is consistent with mild concentric hypertrophy.  · Left ventricular ejection fraction appears to be 56 - 60%. Left ventricular systolic function is normal.  · Abnormal global longitudinal LV strain (GLS) = -12%  · Left ventricular diastolic function was indeterminate.  · Mild pulmonary hypertension is present.    Results for orders placed during the hospital encounter of 06/19/20    Cardiac Catheterization/Vascular Study    Narrative Cardiac Catheterization Operative Report    Patient was referred for cardiac catheterization .   Indications for the procedure include: Symptoms suggestive of angina with   high suspicion for significant obstructive coronary artery disease   With ongoing chest pain    Procedure performed    Coronary angiography  Left heart catheterization  Left ventriculography  Fractional flow reserve of the left anterior descending coronary artery  Supervision of the administration of moderate sedation    Fractional flow reserve of left anterior descending coronary artery using   Ikari 3.56 Norwegian guide  Usual protocol was followed.  Adenosine was used to the or hyperemia.    Guide used as mentioned above.  The guide was advanced and the ostium of the vessel was engaged.  We   zeroed the status post aortic pressure then we advanced a fractional flow   reserve wire.  This was equalized prior to crossing the lesion.  Then we   crossed the lesion.  Anticoagulation was used prior to insertion of the   wire.  After crossing the lesion adenosine was used.  This caused   hyperemia.  Fractional flow reserve was then performed and documented as   noted.  End of case the wire was removed removed angiography was performed   before and after removing the wire to document no complication and decide   results were achieved.       Procedure Details  The risks, benefits, complications, treatment options, and expected   outcomes were discussed with the patient. Plan is for moderate sedation,   and the patient agrees to proceed with the procedure.  An immediate   assessment was done prior to the administration of moderate sedation.     The patient and/or family concurred with the proposed plan, giving   informed consent. Patient was brought to the cath lab after IV hydration   was begun and oral premedication was given. The was further sedated with   Fentanyl  and Versed.    The patient was  prepped and draped in the usual manner. Using the modified   Seldinger access technique, a 6f [place]an sheath was placed in the right   radial  artery.   A left heart catheterization was done. Angiograms were also done.        Cardiac Catheterization Operative Report      Patient was referred for cardiac catheterization: High suspicion for   coronary artery disease    Procedure Details  The risks, benefits, complications, treatment options, and expected   outcomes were discussed with the patient. Plan is for moderate sedation,   and the patient agrees to proceed with the procedure.  An immediate   assessment was done prior to the administration of moderate sedation.    The patient and/or family concurred with the proposed plan, giving   informed consent. Patient was brought to the cath lab after IV hydration   was begun and oral premedication was given.     The skin overlying the patient's right radial artery was prepped and   draped in the usual sterile fashion.  Timeout was taken to confirm the   correct patient and procedure.  Lidocaine was administered for local   anesthesia.  IV Versed and fentanyl were used to achieve conscious   sedation.  Modified Seldinger technique was then used to place a 6 [place]an   sheath in the right radial artery    Diagnostic coronary angiography was performed with Tig coronary catheter.    Coronary angiogram were performed in Wolof and JIMÉNEZ projection to evaluate   the coronary arterial systems.      A TR band device was used to achieve hemostasis at the end of the   procedure.  The patient tolerated the procedure well, and there were no   immediate complications.    Procedural Details: After written and informed consent was obtained, the   patient was brought to the cath lab in a fasting state.       1. Selective coronary angiography:    Left main coronary artery:  The left main coronary artery arises from the   left coronary cusp and bifurcates into the  left anterior descending    coronary artery  and left circumflex arteries.      Left anterior descending artery:  The  left anterior descending coronary   artery   arises normally from the left main coronary artery and courses in   the anterior interventricular groove and terminates at the apex.  50%   stenosis noted of the proximal left anterior descending coronary artery   with large circumferential soft atherosclerotic plaque  Normal fractional flow reserve of 0.9 or higher    Left circumflex:  The left circumflex arises form the left man artery and   supplies obtuse marginal branches.  Minor atherosclerotic plaque noted in   the mid left circumflex coronary artery which is dominant and large    Right coronary artery:  The right coronary artery  arises normally from   the right coronary cusp and is dominant for the posterior circulation.    The right coronary artery  is non-dominant and normal.    2. Left heart cath: Moderately elevated left ventricular end-diastolic   pressure of 24 mmHg.    3. LV Gram: Normal left ventricular ejection fraction of 55% with no   regional wall motion abnormalities.    4. Interventions: None    Estimated Blood Loss:  Minimal    Specimens: None         Complications:  None; patient tolerated the procedure well.           Disposition: Cardiovascular observation unit           Condition: stable            I supervised the administration of conscious sedation by nursing staff   throughout the case.  First dose was given at 1119 and the end of my   face-to-face encounter was at 1145 Hours.    During the case, continuous pulse oximetry, heart rate, blood pressure,   and patient status were monitored.       Risk, Benefits, and Alternatives discussed with the patient and/or family.    Plan is for moderate sedation, and the patient agrees to proceed with the   procedure.  An immediate assessment was done prior to the administration   of moderate sedation      Conclusion    50% stenosis of proximal left anterior  descending coronary artery with a   normal fractional flow reserve 0.9 or higher with a large circumferential   plaque burden for which aggressive medical therapy is advised  Large dominant left circumflex coronary artery with minor atherosclerotic   plaque in the midsegment without any significant lesions  Right coronary artery is nondominant small and normal  Moderately elevated left ventricular end-diastolic pressure of 24 mmHg  Normal left ventricular ejection fraction of 55% without any discrete wall   motion abnormalities and no significant mitral regurgitation        Plan    Keep LDL well below 70 mg/dL  Treat for elevated left ventricular end-diastolic pressure  Can be discharged home today after period of observation  Hydration   Observation  Risk factor modifications           Abnormal coronary CT angiography with 25 to 49% stenosis of the left   anterior descending coronary artery         The following portions of the patient's history were reviewed and updated as appropriate: allergies, current medications, past family history, past medical history, past social history, past surgical history and problem list.    Allergies   Allergen Reactions   • Aspirin GI Intolerance     Nausea and vomitting   • Latex Rash     Blisters with tape and intolerance with gloves       Current Outpatient Medications:   •  albuterol sulfate  (90 Base) MCG/ACT inhaler, Inhale 2 puffs Every 6 (Six) Hours As Needed., Disp: , Rfl:   •  amLODIPine (NORVASC) 10 MG tablet, Take 1 tablet by mouth Daily., Disp: 90 tablet, Rfl: 4  •  ARIPiprazole (ABILIFY) 10 MG tablet, Take 10 mg by mouth every night at bedtime., Disp: , Rfl:   •  atorvastatin (LIPITOR) 10 MG tablet, Take 1 tablet by mouth Daily., Disp: 30 tablet, Rfl: 11  •  Blood Pressure Monitoring (SPHYGMOMANOMETER) misc, 1 Units 2 (Two) Times a Day., Disp: 1 each, Rfl: 0  •  carvedilol (COREG) 25 MG tablet, Take 1 tablet by mouth 2 (Two) Times a Day., Disp: 180 tablet, Rfl:  3  •  clonazePAM (KlonoPIN) 0.5 MG tablet, Take 0.5 mg by mouth 2 (Two) Times a Day., Disp: , Rfl:   •  cloNIDine (CATAPRES) 0.1 MG tablet, Take 1 tablet by mouth 2 (Two) Times a Day As Needed for High Blood Pressure., Disp: 180 tablet, Rfl: 3  •  cloNIDine (CATAPRES-TTS) 0.3 MG/24HR patch, Place 1 patch on the skin as directed by provider 1 (One) Time Per Week., Disp: 12 patch, Rfl: 3  •  doxepin (SINEquan) 25 MG capsule, 1 CAPSULE BY MOUTH AT BEDTIME FILL ON OR AFTER 1/4/2018, Disp: , Rfl: 1  •  gabapentin (NEURONTIN) 300 MG capsule, Take 300 mg by mouth 2 (two) times a day., Disp: , Rfl:   •  gabapentin (NEURONTIN) 600 MG tablet, Take 600 mg by mouth Daily., Disp: , Rfl:   •  hydrALAZINE (APRESOLINE) 25 MG tablet, Take 1 tablet by mouth 3 (Three) Times a Day., Disp: 270 tablet, Rfl: 3  •  hydroCHLOROthiazide (HYDRODIURIL) 50 MG tablet, Take 1 tablet by mouth Daily., Disp: 90 tablet, Rfl: 3  •  HYDROcodone-acetaminophen (NORCO)  MG per tablet, Take 1 tablet by mouth 3 (Three) Times a Day., Disp: , Rfl:   •  hydrOXYzine pamoate (VISTARIL) 25 MG capsule, Take 25 mg by mouth Every 8 (Eight) Hours As Needed., Disp: , Rfl:   •  levETIRAcetam (KEPPRA) 500 MG tablet, Take 500 mg by mouth 2 (Two) Times a Day., Disp: , Rfl:   •  lisinopril (PRINIVIL,ZESTRIL) 40 MG tablet, TAKE 1 TABLET BY MOUTH TWICE A DAY, Disp: 60 tablet, Rfl: 7  •  nitroglycerin (NITROSTAT) 0.4 MG SL tablet, Take no more than 3 doses in 15 minutes., Disp: 30 tablet, Rfl: 3  •  SUMAtriptan (IMITREX) 50 MG tablet, 1 (One) Time As Needed., Disp: , Rfl:   •  vitamin D (ERGOCALCIFEROL) 87436 units capsule capsule, Take 50,000 Units by mouth Every 7 (Seven) Days., Disp: , Rfl:   Past Medical History:   Diagnosis Date   • Arthritis    • Cancer (CMS/HCC)     CREVICAL, UTERINE   • COPD (chronic obstructive pulmonary disease) (CMS/HCC)    • Fatty liver    • GERD (gastroesophageal reflux disease)    • History of transfusion    • Hypertension    • Kidney  stone    • Low back pain potentially associated with spinal stenosis    • Lupus (CMS/HCC)    • Migraines    • Scoliosis    • Shortness of breath    • UTI (urinary tract infection)      Past Surgical History:   Procedure Laterality Date   • BACK SURGERY      lumbar   • CARDIAC CATHETERIZATION     • CARDIAC CATHETERIZATION N/A 2020    Procedure: Cardiac Catheterization/Vascular Study Radial cath;  Surgeon: Robert Padilla MD;  Location: [facility] CATH INVASIVE LOCATION;  Service: Cardiology;  Laterality: N/A;   • CARDIAC DEFIBRILLATOR PLACEMENT     •  SECTION     • CHOLECYSTECTOMY     • COLON RESECTION       or    • COLONOSCOPY     • COLONOSCOPY N/A 5/10/2018    Procedure: COLONOSCOPY WITH ANESTHESIA;  Surgeon: Shawna Valladares MD;  Location: [facility] ENDOSCOPY;  Service: Gastroenterology   • ENDOSCOPY N/A 5/10/2018    Procedure: ESOPHAGOGASTRODUODENOSCOPY WITH ANESTHESIA;  Surgeon: Shawna Valladares MD;  Location: [facility] ENDOSCOPY;  Service: Gastroenterology   • EYE SURGERY      x 2   • HYSTERECTOMY     • LEG SURGERY Right     bars/bolts placed   • UPPER GASTROINTESTINAL ENDOSCOPY     • URETEROSCOPY LASER LITHOTRIPSY WITH STENT INSERTION Right 2018    Procedure: URETEROSCOPY  WITH STENT INSERTION RETROGRADE PYELOGRAM;  Surgeon: Tyrell Hardin MD;  Location: [facility] OR;  Service: Urology     Family History   Problem Relation Age of Onset   • Cancer Mother    • Heart disease Mother    • Heart disease Father    • Lupus Sister    • Heart disease Sister    • Heart disease Sister    • Lupus Sister    • Hypokalemia Sister    • Alcohol abuse Sister    • Colon cancer Neg Hx    • Colon polyps Neg Hx      Social History     Socioeconomic History   • Marital status:      Spouse name: Not on file   • Number of children: Not on file   • Years of education: Not on file   • Highest education level: Not on file   Tobacco Use   • Smoking status: Current Every Day Smoker     Packs/day: 1.00      Years: 40.00     Pack years: 40.00     Types: Cigarettes   • Smokeless tobacco: Never Used   • Tobacco comment: started smoking more since son    Vaping Use   • Vaping Use: Never used   Substance and Sexual Activity   • Alcohol use: Not Currently     Comment: quit    • Drug use: Never   • Sexual activity: Defer       Review of Systems   Constitutional: Positive for malaise/fatigue and weight gain. Negative for chills, decreased appetite, diaphoresis, fever, night sweats and weight loss.   HENT: Negative for nosebleeds.    Eyes: Negative for blurred vision and visual disturbance.   Cardiovascular: Positive for chest pain, leg swelling and orthopnea. Negative for claudication, cyanosis, dyspnea on exertion, irregular heartbeat, near-syncope, palpitations, paroxysmal nocturnal dyspnea and syncope.   Respiratory: Positive for shortness of breath. Negative for cough, hemoptysis, snoring and wheezing.    Endocrine: Negative for cold intolerance and heat intolerance.   Hematologic/Lymphatic: Negative for bleeding problem. Does not bruise/bleed easily.   Skin: Negative for dry skin and rash.   Musculoskeletal: Negative for back pain, falls, myalgias, neck pain and stiffness.   Gastrointestinal: Positive for bloating. Negative for abdominal pain, change in bowel habit, constipation, diarrhea, dysphagia, heartburn, nausea and vomiting.   Genitourinary: Negative for hematuria.   Neurological: Negative for dizziness, focal weakness, headaches, light-headedness, numbness and weakness.   Psychiatric/Behavioral: Negative for altered mental status. The patient has insomnia.    Allergic/Immunologic: Negative for persistent infections.              Objective:     Vitals and nursing note reviewed.   Constitutional:       General: Not in acute distress.     Appearance: Normal and healthy appearance. Well-developed, normal weight and not in distress. Not diaphoretic.   Eyes:      General: Lids are normal.         Right eye: No  discharge.         Left eye: No discharge.      Conjunctiva/sclera: Conjunctivae normal.      Pupils: Pupils are equal, round, and reactive to light.   HENT:      Head: Normocephalic and atraumatic.      Jaw: There is normal jaw occlusion.      Right Ear: External ear normal.      Left Ear: External ear normal.      Nose: Nose normal.   Neck:      Thyroid: No thyromegaly.      Vascular: JVD present. No carotid bruit or JVR. JVD normal.      Trachea: Trachea normal. No tracheal deviation.   Pulmonary:      Effort: Pulmonary effort is normal. No respiratory distress.      Breath sounds: Examination of the right-lower field reveals decreased breath sounds. Examination of the left-lower field reveals decreased breath sounds. Decreased breath sounds present. No wheezing. No rhonchi. No rales.   Chest:      Chest wall: Not tender to palpatation.   Cardiovascular:      PMI at left midclavicular line. Normal rate. Regular rhythm. Normal S1. Normal S2.      Murmurs: There is no murmur.      No gallop. No click. No rub.   Pulses:     Intact distal pulses. No decreased pulses.   Edema:     Peripheral edema present.  Abdominal:      General: Bowel sounds are normal. There is distension.      Palpations: Abdomen is soft.      Tenderness: There is no abdominal tenderness.   Musculoskeletal: Normal range of motion.         General: No tenderness or deformity.      Cervical back: Normal range of motion and neck supple. Skin:     General: Skin is warm and dry.      Coloration: Skin is not pale.      Findings: No erythema or rash.   Neurological:      General: No focal deficit present.      Mental Status: Alert, oriented to person, place, and time and oriented to person, place and time.   Psychiatric:         Attention and Perception: Attention and perception normal.         Mood and Affect: Mood and affect normal.         Speech: Speech normal.         Behavior: Behavior normal.         Thought Content: Thought content normal.        "  Cognition and Memory: Cognition and memory normal.         Judgment: Judgment normal.           Procedures  /88   Pulse 74   Ht 160 cm (63\")   Wt 89.4 kg (197 lb)   SpO2 98%   BMI 34.90 kg/m²   Lab Review:   Lab Results   Component Value Date    WBC 8.80 06/19/2020    HGB 16.6 (H) 06/19/2020    HCT 51.4 (H) 06/19/2020    MCV 88.9 06/19/2020     06/19/2020     Lab Results   Component Value Date    GLUCOSE 84 02/24/2021    BUN 20 02/24/2021    CREATININE 0.60 02/25/2021    EGFRIFNONA 120 02/24/2021    BCR 38.5 (H) 02/24/2021    K 4.0 02/24/2021    CO2 30.0 (H) 02/24/2021    CALCIUM 9.5 02/24/2021    ALBUMIN 4.20 02/24/2021    AST 13 02/24/2021    ALT 13 02/24/2021     Lab Results   Component Value Date    CHOL 165 02/24/2021    CHOL 186 10/26/2017    CHLPL 232 (H) 04/19/2016     Lab Results   Component Value Date    TRIG 227 (H) 02/24/2021    TRIG 185 (H) 10/26/2017    TRIG 267 (H) 04/19/2016     Lab Results   Component Value Date    HDL 60 02/24/2021    HDL 55 10/26/2017    HDL 57 04/19/2016     Lab Results   Component Value Date    LDL 68 02/24/2021     (H) 10/26/2017     (H) 04/19/2016       I have reviewed the most recent lab results.       Assessment:          Diagnosis Plan   1. Nonobstructive atherosclerosis of coronary artery  50% mid-LAD stenosis on [facility] 6/20.  Elyssa scan scheduled for 3/15/2021.   2. Essential hypertension  Blood pressures have been well controlled.     3. Mixed hyperlipidemia   cholesterol well controlled.  LDL 68 on lipid panel 2/24/2021.  Continue Lipitor.   4. Enlargement of abdominal aorta (CMS/HCC) small infra renal  US aorta 8/14/2020 - no evidence of AAA   5. LVH (left ventricular hypertrophy) mild   mild on echo 2/25/2021.   6. Gastroesophageal reflux disease without esophagitis  Managed by PCP.   7. Lupus (CMS/HCC) Follows with rheumatology.  Normal sed rate and CRP 2/24/2021.   8. Tobacco abuse  Akua Aguilar  reports that she has been " smoking cigarettes. She has a 40.00 pack-year smoking history. She has never used smokeless tobacco.. I have educated her on the risk of diseases from using tobacco products such as cancer, COPD and heart diease.     I advised her to quit and she is not willing to quit.    I spent 3  minutes counseling the patient.          9. Class 1 obesity due to excess calories with serious comorbidity and body mass index (BMI) of 34.0 to 34.9 in adult Patient's Body mass index is 34.9 kg/m². BMI is above normal parameters. Recommendations include: educational material.            Plan:       1. Continue medications as previously prescribed.  2. Report any worsening symptoms.  3. Report any signs of bleeding.  4. Continue heart healthy diet and regular exercise as tolerated.   5. Follow up with PCP for blood pressure and cholesterol management and routine lab work.  6.  Monitor and record daily blood pressure. Report readings consistently higher than 140/90 or consistently lower than 100/60.   7. Follow up in four weeks or sooner if needed.  8.  Patient scheduled for Lexiscan on 3/15/2021.

## 2021-03-11 NOTE — PATIENT INSTRUCTIONS
Steps to Quit Smoking  Smoking tobacco is the leading cause of preventable death. It can affect almost every organ in the body. Smoking puts you and people around you at risk for many serious, long-lasting (chronic) diseases. Quitting smoking can be hard, but it is one of the best things that you can do for your health. It is never too late to quit.  How do I get ready to quit?  When you decide to quit smoking, make a plan to help you succeed. Before you quit:  · Pick a date to quit. Set a date within the next 2 weeks to give you time to prepare.  · Write down the reasons why you are quitting. Keep this list in places where you will see it often.  · Tell your family, friends, and co-workers that you are quitting. Their support is important.  · Talk with your doctor about the choices that may help you quit.  · Find out if your health insurance will pay for these treatments.  · Know the people, places, things, and activities that make you want to smoke (triggers). Avoid them.  What first steps can I take to quit smoking?  · Throw away all cigarettes at home, at work, and in your car.  · Throw away the things that you use when you smoke, such as ashtrays and lighters.  · Clean your car. Make sure to empty the ashtray.  · Clean your home, including curtains and carpets.  What can I do to help me quit smoking?  Talk with your doctor about taking medicines and seeing a counselor at the same time. You are more likely to succeed when you do both.  · If you are pregnant or breastfeeding, talk with your doctor about counseling or other ways to quit smoking. Do not take medicine to help you quit smoking unless your doctor tells you to do so.  To quit smoking:  Quit right away  · Quit smoking totally, instead of slowly cutting back on how much you smoke over a period of time.  · Go to counseling. You are more likely to quit if you go to counseling sessions regularly.  Take medicine  You may take medicines to help you quit. Some  medicines need a prescription, and some you can buy over-the-counter. Some medicines may contain a drug called nicotine to replace the nicotine in cigarettes. Medicines may:  · Help you to stop having the desire to smoke (cravings).  · Help to stop the problems that come when you stop smoking (withdrawal symptoms).  Your doctor may ask you to use:  · Nicotine patches, gum, or lozenges.  · Nicotine inhalers or sprays.  · Non-nicotine medicine that is taken by mouth.  Find resources  Find resources and other ways to help you quit smoking and remain smoke-free after you quit. These resources are most helpful when you use them often. They include:  · Online chats with a counselor.  · Phone quitlines.  · Printed self-help materials.  · Support groups or group counseling.  · Text messaging programs.  · Mobile phone apps. Use apps on your mobile phone or tablet that can help you stick to your quit plan. There are many free apps for mobile phones and tablets as well as websites. Examples include Quit Guide from the CDC and smokefree.gov    What things can I do to make it easier to quit?    · Talk to your family and friends. Ask them to support and encourage you.  · Call a phone quitline (8-356-QUITNOW), reach out to support groups, or work with a counselor.  · Ask people who smoke to not smoke around you.  · Avoid places that make you want to smoke, such as:  ? Bars.  ? Parties.  ? Smoke-break areas at work.  · Spend time with people who do not smoke.  · Lower the stress in your life. Stress can make you want to smoke. Try these things to help your stress:  ? Getting regular exercise.  ? Doing deep-breathing exercises.  ? Doing yoga.  ? Meditating.  ? Doing a body scan. To do this, close your eyes, focus on one area of your body at a time from head to toe. Notice which parts of your body are tense. Try to relax the muscles in those areas.  How will I feel when I quit smoking?  Day 1 to 3 weeks  Within the first 24 hours,  you may start to have some problems that come from quitting tobacco. These problems are very bad 2-3 days after you quit, but they do not often last for more than 2-3 weeks. You may get these symptoms:  · Mood swings.  · Feeling restless, nervous, angry, or annoyed.  · Trouble concentrating.  · Dizziness.  · Strong desire for high-sugar foods and nicotine.  · Weight gain.  · Trouble pooping (constipation).  · Feeling like you may vomit (nausea).  · Coughing or a sore throat.  · Changes in how the medicines that you take for other issues work in your body.  · Depression.  · Trouble sleeping (insomnia).  Week 3 and afterward  After the first 2-3 weeks of quitting, you may start to notice more positive results, such as:  · Better sense of smell and taste.  · Less coughing and sore throat.  · Slower heart rate.  · Lower blood pressure.  · Clearer skin.  · Better breathing.  · Fewer sick days.  Quitting smoking can be hard. Do not give up if you fail the first time. Some people need to try a few times before they succeed. Do your best to stick to your quit plan, and talk with your doctor if you have any questions or concerns.  Summary  · Smoking tobacco is the leading cause of preventable death. Quitting smoking can be hard, but it is one of the best things that you can do for your health.  · When you decide to quit smoking, make a plan to help you succeed.  · Quit smoking right away, not slowly over a period of time.  · When you start quitting, seek help from your doctor, family, or friends.  This information is not intended to replace advice given to you by your health care provider. Make sure you discuss any questions you have with your health care provider.  Document Revised: 09/11/2020 Document Reviewed: 03/07/2020  Elsevier Patient Education © 2020 [employer]  Health Risks of Smoking  Smoking cigarettes is very bad for your health. Tobacco smoke has over 200 known poisons in it. It contains the poisonous gases  nitrogen oxide and carbon monoxide. There are over 60 chemicals in tobacco smoke that cause cancer.  Smoking is difficult to quit because a chemical in tobacco, called nicotine, causes addiction or dependence. When you smoke and inhale, nicotine is absorbed rapidly into the bloodstream through your lungs. Both inhaled and non-inhaled nicotine may be addictive.  What are the risks of cigarette smoke?  Cigarette smokers have an increased risk of many serious medical problems, including:  · Lung cancer.  · Lung disease, such as pneumonia, bronchitis, and emphysema.  · Chest pain (angina) and heart attack because the heart is not getting enough oxygen.  · Heart disease and peripheral blood vessel disease.  · High blood pressure (hypertension).  · Stroke.  · Oral cancer, including cancer of the lip, mouth, or voice box.  · Bladder cancer.  · Pancreatic cancer.  · Cervical cancer.  · Pregnancy complications, including premature birth.  · Stillbirths and smaller  babies, birth defects, and genetic damage to sperm.  · Early menopause.  · Lower estrogen level for women.  · Infertility.  · Facial wrinkles.  · Blindness.  · Increased risk of broken bones (fractures).  · Senile dementia.  · Stomach ulcers and internal bleeding.  · Delayed wound healing and increased risk of complications during surgery.  · Even smoking lightly shortens your life expectancy by several years.  Because of secondhand smoke exposure, children of smokers have an increased risk of the following:  · Sudden infant death syndrome (SIDS).  · Respiratory infections.  · Lung cancer.  · Heart disease.  · Ear infections.  What are the benefits of quitting?  There are many health benefits of quitting smoking. Here are some of them:  · Within days of quitting smoking, your risk of having a heart attack decreases, your blood flow improves, and your lung capacity improves. Blood pressure, pulse rate, and breathing patterns start returning to normal soon  after quitting.  · Within months, your lungs may clear up completely.  · Quitting for 10 years reduces your risk of developing lung cancer and heart disease to almost that of a nonsmoker.  · People who quit may see an improvement in their overall quality of life.  How do I quit smoking?         Smoking is an addiction with both physical and psychological effects, and longtime habits can be hard to change. Your health care provider can recommend:  · Programs and community resources, which may include group support, education, or talk therapy.  · Prescription medicines to help reduce cravings.  · Nicotine replacement products, such as patches, gum, and nasal sprays. Use these products only as directed. Do not replace cigarette smoking with electronic cigarettes, which are commonly called e-cigarettes. The safety of e-cigarettes is not known, and some may contain harmful chemicals.  · A combination of two or more of these methods.  Where to find more information  · American Lung Association: www.lung.org  · American Cancer Society: www.cancer.org  Summary  · Smoking cigarettes is very bad for your health. Cigarette smokers have an increased risk of many serious medical problems, including several cancers, heart disease, and stroke.  · Smoking is an addiction with both physical and psychological effects, and longtime habits can be hard to change.  · By stopping right away, you can greatly reduce the risk of medical problems for you and your family.  · To help you quit smoking, your health care provider can recommend programs, community resources, prescription medicines, and nicotine replacement products such as patches, gum, and nasal sprays.  This information is not intended to replace advice given to you by your health care provider. Make sure you discuss any questions you have with your health care provider.  Document Revised: 03/21/2019 Document Reviewed: 12/22/2017  Elsevier Patient Education © 2020 [employer]  Obesity, Adult  Obesity is having too much body fat. Being obese means that your weight is more than what is healthy for you.  BMI is a number that explains how much body fat you have. If you have a BMI of 30 or more, you are obese. Obesity is often caused by eating or drinking more calories than your body uses. Changing your lifestyle can help you lose weight.  Obesity can cause serious health problems, such as:  · Stroke.  · Coronary artery disease (CAD).  · Type 2 diabetes.  · Some types of cancer, including cancers of the colon, breast, uterus, and gallbladder.  · Osteoarthritis.  · High blood pressure (hypertension).  · High cholesterol.  · Sleep apnea.  · Gallbladder stones.  · Infertility problems.  What are the causes?  · Eating meals each day that are high in calories, sugar, and fat.  · Being born with genes that may make you more likely to become obese.  · Having a medical condition that causes obesity.  · Taking certain medicines.  · Sitting a lot (having a sedentary lifestyle).  · Not getting enough sleep.  · Drinking a lot of drinks that have sugar in them.  What increases the risk?  · Having a family history of obesity.  · Being an  woman.  · Being a  man.  · Living in an area with limited access to:  ? Pepper, recreation centers, or sidewalks.  ? Healthy food choices, such as grocery stores and [employer] markets.  What are the signs or symptoms?  The main sign is having too much body fat.  How is this treated?  · Treatment for this condition often includes changing your lifestyle. Treatment may include:  ? Changing your diet. This may include making a healthy meal plan.  ? Exercise. This may include activity that causes your heart to beat faster (aerobic exercise) and strength training. Work with your doctor to design a program that works for you.  ? Medicine to help you lose weight. This may be used if you are not able to lose 1 pound a week after 6 weeks of healthy  eating and more exercise.  ? Treating conditions that cause the obesity.  ? Surgery. Options may include gastric banding and gastric bypass. This may be done if:  § Other treatments have not helped to improve your condition.  § You have a BMI of 40 or higher.  § You have life-threatening health problems related to obesity.  Follow these instructions at home:  Eating and drinking    · Follow advice from your doctor about what to eat and drink. Your doctor may tell you to:  ? Limit fast food, sweets, and processed snack foods.  ? Choose low-fat options. For example, choose low-fat milk instead of whole milk.  ? Eat 5 or more servings of fruits or vegetables each day.  ? Eat at home more often. This gives you more control over what you eat.  ? Choose healthy foods when you eat out.  ? Learn to read food labels. This will help you learn how much food is in 1 serving.  ? Keep low-fat snacks available.  ? Avoid drinks that have a lot of sugar in them. These include soda, fruit juice, iced tea with sugar, and flavored milk.  · Drink enough water to keep your pee (urine) pale yellow.  · Do not go on fad diets.  Physical activity  · Exercise often, as told by your doctor. Most adults should get up to 150 minutes of moderate-intensity exercise every week.Ask your doctor:  ? What types of exercise are safe for you.  ? How often you should exercise.  · Warm up and stretch before being active.  · Do slow stretching after being active (cool down).  · Rest between times of being active.  Lifestyle  · Work with your doctor and a food expert (dietitian) to set a weight-loss goal that is best for you.  · Limit your screen time.  · Find ways to reward yourself that do not involve food.  · Do not drink alcohol if:  ? Your doctor tells you not to drink.  ? You are pregnant, may be pregnant, or are planning to become pregnant.  · If you drink alcohol:  ? Limit how much you use to:  § 0-1 drink a day for women.  § 0-2 drinks a day for  men.  ? Be aware of how much alcohol is in your drink. In the [place] one drink equals one 12 oz bottle of beer (355 mL), one 5 oz glass of wine (148 mL), or one 1½ oz glass of hard liquor (44 mL).  General instructions  · Keep a weight-loss journal. This can help you keep track of:  ? The food that you eat.  ? How much exercise you get.  · Take over-the-counter and prescription medicines only as told by your doctor.  · Take vitamins and supplements only as told by your doctor.  · Think about joining a support group.  · Keep all follow-up visits as told by your doctor. This is important.  Contact a doctor if:  · You cannot meet your weight loss goal after you have changed your diet and lifestyle for 6 weeks.  Get help right away if you:  · Are having trouble breathing.  · Are having thoughts of harming yourself.  Summary  · Obesity is having too much body fat.  · Being obese means that your weight is more than what is healthy for you.  · Work with your doctor to set a weight-loss goal.  · Get regular exercise as told by your doctor.  This information is not intended to replace advice given to you by your health care provider. Make sure you discuss any questions you have with your health care provider.  Document Revised: 08/22/2019 Document Reviewed: 08/22/2019  [employer]vier Patient Education © 2020 [employer]

## 2021-03-15 ENCOUNTER — APPOINTMENT (OUTPATIENT)
Dept: CARDIOLOGY | Facility: HOSPITAL | Age: 61
End: 2021-03-15

## 2021-03-25 ENCOUNTER — HOSPITAL ENCOUNTER (OUTPATIENT)
Dept: CARDIOLOGY | Facility: HOSPITAL | Age: 61
Discharge: HOME | End: 2021-03-25

## 2021-03-25 DIAGNOSIS — R07.2 PRECORDIAL CHEST PAIN: ICD-10-CM

## 2021-03-25 PROCEDURE — 78452 HT MUSCLE IMAGE SPECT MULT: CPT

## 2021-03-25 PROCEDURE — 93018 CV STRESS TEST I&R ONLY: CPT | Performed by: INTERNAL MEDICINE

## 2021-03-25 PROCEDURE — A9500 TC99M SESTAMIBI: HCPCS | Performed by: NURSE PRACTITIONER

## 2021-03-25 PROCEDURE — 0 TECHNETIUM SESTAMIBI: Performed by: NURSE PRACTITIONER

## 2021-03-25 PROCEDURE — 25010000002 REGADENOSON 0.4 MG/5ML SOLUTION: Performed by: INTERNAL MEDICINE

## 2021-03-25 PROCEDURE — 93017 CV STRESS TEST TRACING ONLY: CPT

## 2021-03-25 PROCEDURE — 78452 HT MUSCLE IMAGE SPECT MULT: CPT | Performed by: INTERNAL MEDICINE

## 2021-03-25 RX ADMIN — TECHNETIUM TC 99M SESTAMIBI 1 DOSE: 1 INJECTION INTRAVENOUS at 09:39

## 2021-03-25 RX ADMIN — REGADENOSON 0.4 MG: 0.08 INJECTION, SOLUTION INTRAVENOUS at 11:03

## 2021-03-25 RX ADMIN — TECHNETIUM TC 99M SESTAMIBI 1 DOSE: 1 INJECTION INTRAVENOUS at 11:09

## 2021-03-26 LAB
BH CV NUCLEAR PRIOR STUDY: 3
BH CV REST NUCLEAR ISOTOPE DOSE: 11.5 MCI
BH CV STRESS BP STAGE 1: NORMAL
BH CV STRESS COMMENTS STAGE 1: NORMAL
BH CV STRESS DOSE REGADENOSON STAGE 1: 0.4
BH CV STRESS DURATION MIN STAGE 1: 0
BH CV STRESS DURATION SEC STAGE 1: 10
BH CV STRESS HR STAGE 1: 100
BH CV STRESS NUCLEAR ISOTOPE DOSE: 35 MCI
BH CV STRESS PROTOCOL 1: NORMAL
BH CV STRESS RECOVERY BP: NORMAL MMHG
BH CV STRESS RECOVERY HR: 93 BPM
BH CV STRESS STAGE 1: 1
LV EF NUC BP: 52 %
MAXIMAL PREDICTED HEART RATE: 160 BPM
PERCENT MAX PREDICTED HR: 62.5 %
STRESS BASELINE BP: NORMAL MMHG
STRESS BASELINE HR: 83 BPM
STRESS PERCENT HR: 74 %
STRESS POST PEAK BP: NORMAL MMHG
STRESS POST PEAK HR: 100 BPM
STRESS TARGET HR: 136 BPM

## 2021-04-08 ENCOUNTER — OFFICE VISIT (OUTPATIENT)
Dept: CARDIOLOGY | Facility: CLINIC | Age: 61
End: 2021-04-08

## 2021-04-08 VITALS
HEART RATE: 79 BPM | SYSTOLIC BLOOD PRESSURE: 120 MMHG | DIASTOLIC BLOOD PRESSURE: 80 MMHG | BODY MASS INDEX: 35.08 KG/M2 | OXYGEN SATURATION: 95 % | WEIGHT: 198 LBS | HEIGHT: 63 IN

## 2021-04-08 DIAGNOSIS — I51.7 LVH (LEFT VENTRICULAR HYPERTROPHY): ICD-10-CM

## 2021-04-08 DIAGNOSIS — Z72.0 TOBACCO ABUSE: ICD-10-CM

## 2021-04-08 DIAGNOSIS — E66.09 CLASS 1 OBESITY DUE TO EXCESS CALORIES WITH SERIOUS COMORBIDITY AND BODY MASS INDEX (BMI) OF 34.0 TO 34.9 IN ADULT: ICD-10-CM

## 2021-04-08 DIAGNOSIS — I10 ESSENTIAL HYPERTENSION: ICD-10-CM

## 2021-04-08 DIAGNOSIS — E78.2 MIXED HYPERLIPIDEMIA: ICD-10-CM

## 2021-04-08 DIAGNOSIS — I25.10 NONOBSTRUCTIVE ATHEROSCLEROSIS OF CORONARY ARTERY: Primary | ICD-10-CM

## 2021-04-08 DIAGNOSIS — I77.89 ENLARGEMENT OF ABDOMINAL AORTA (HCC): ICD-10-CM

## 2021-04-08 DIAGNOSIS — K21.9 GASTROESOPHAGEAL REFLUX DISEASE WITHOUT ESOPHAGITIS: ICD-10-CM

## 2021-04-08 DIAGNOSIS — M32.9 LUPUS (HCC): ICD-10-CM

## 2021-04-08 PROCEDURE — 99214 OFFICE O/P EST MOD 30 MIN: CPT | Performed by: NURSE PRACTITIONER

## 2021-04-08 RX ORDER — ATENOLOL 50 MG/1
50 TABLET ORAL DAILY
COMMUNITY
Start: 2021-03-01 | End: 2021-04-16 | Stop reason: HOSPADM

## 2021-04-08 NOTE — PATIENT INSTRUCTIONS
Steps to Quit Smoking  Smoking tobacco is the leading cause of preventable death. It can affect almost every organ in the body. Smoking puts you and people around you at risk for many serious, long-lasting (chronic) diseases. Quitting smoking can be hard, but it is one of the best things that you can do for your health. It is never too late to quit.  How do I get ready to quit?  When you decide to quit smoking, make a plan to help you succeed. Before you quit:  · Pick a date to quit. Set a date within the next 2 weeks to give you time to prepare.  · Write down the reasons why you are quitting. Keep this list in places where you will see it often.  · Tell your family, friends, and co-workers that you are quitting. Their support is important.  · Talk with your doctor about the choices that may help you quit.  · Find out if your health insurance will pay for these treatments.  · Know the people, places, things, and activities that make you want to smoke (triggers). Avoid them.  What first steps can I take to quit smoking?  · Throw away all cigarettes at home, at work, and in your car.  · Throw away the things that you use when you smoke, such as ashtrays and lighters.  · Clean your car. Make sure to empty the ashtray.  · Clean your home, including curtains and carpets.  What can I do to help me quit smoking?  Talk with your doctor about taking medicines and seeing a counselor at the same time. You are more likely to succeed when you do both.  · If you are pregnant or breastfeeding, talk with your doctor about counseling or other ways to quit smoking. Do not take medicine to help you quit smoking unless your doctor tells you to do so.  To quit smoking:  Quit right away  · Quit smoking totally, instead of slowly cutting back on how much you smoke over a period of time.  · Go to counseling. You are more likely to quit if you go to counseling sessions regularly.  Take medicine  You may take medicines to help you quit. Some  medicines need a prescription, and some you can buy over-the-counter. Some medicines may contain a drug called nicotine to replace the nicotine in cigarettes. Medicines may:  · Help you to stop having the desire to smoke (cravings).  · Help to stop the problems that come when you stop smoking (withdrawal symptoms).  Your doctor may ask you to use:  · Nicotine patches, gum, or lozenges.  · Nicotine inhalers or sprays.  · Non-nicotine medicine that is taken by mouth.  Find resources  Find resources and other ways to help you quit smoking and remain smoke-free after you quit. These resources are most helpful when you use them often. They include:  · Online chats with a counselor.  · Phone quitlines.  · Printed self-help materials.  · Support groups or group counseling.  · Text messaging programs.  · Mobile phone apps. Use apps on your mobile phone or tablet that can help you stick to your quit plan. There are many free apps for mobile phones and tablets as well as websites. Examples include Quit Guide from the CDC and smokefree.gov    What things can I do to make it easier to quit?    · Talk to your family and friends. Ask them to support and encourage you.  · Call a phone quitline (7-446-QUITNOW), reach out to support groups, or work with a counselor.  · Ask people who smoke to not smoke around you.  · Avoid places that make you want to smoke, such as:  ? Bars.  ? Parties.  ? Smoke-break areas at work.  · Spend time with people who do not smoke.  · Lower the stress in your life. Stress can make you want to smoke. Try these things to help your stress:  ? Getting regular exercise.  ? Doing deep-breathing exercises.  ? Doing yoga.  ? Meditating.  ? Doing a body scan. To do this, close your eyes, focus on one area of your body at a time from head to toe. Notice which parts of your body are tense. Try to relax the muscles in those areas.  How will I feel when I quit smoking?  Day 1 to 3 weeks  Within the first 24 hours,  you may start to have some problems that come from quitting tobacco. These problems are very bad 2-3 days after you quit, but they do not often last for more than 2-3 weeks. You may get these symptoms:  · Mood swings.  · Feeling restless, nervous, angry, or annoyed.  · Trouble concentrating.  · Dizziness.  · Strong desire for high-sugar foods and nicotine.  · Weight gain.  · Trouble pooping (constipation).  · Feeling like you may vomit (nausea).  · Coughing or a sore throat.  · Changes in how the medicines that you take for other issues work in your body.  · Depression.  · Trouble sleeping (insomnia).  Week 3 and afterward  After the first 2-3 weeks of quitting, you may start to notice more positive results, such as:  · Better sense of smell and taste.  · Less coughing and sore throat.  · Slower heart rate.  · Lower blood pressure.  · Clearer skin.  · Better breathing.  · Fewer sick days.  Quitting smoking can be hard. Do not give up if you fail the first time. Some people need to try a few times before they succeed. Do your best to stick to your quit plan, and talk with your doctor if you have any questions or concerns.  Summary  · Smoking tobacco is the leading cause of preventable death. Quitting smoking can be hard, but it is one of the best things that you can do for your health.  · When you decide to quit smoking, make a plan to help you succeed.  · Quit smoking right away, not slowly over a period of time.  · When you start quitting, seek help from your doctor, family, or friends.  This information is not intended to replace advice given to you by your health care provider. Make sure you discuss any questions you have with your health care provider.  Document Revised: 09/11/2020 Document Reviewed: 03/07/2020  [employer] Patient Education © 2021 [employer]  Health Risks of Smoking  Smoking cigarettes is very bad for your health. Tobacco smoke has over 200 known poisons in it. It contains the poisonous gases  nitrogen oxide and carbon monoxide. There are over 60 chemicals in tobacco smoke that cause cancer.  Smoking is difficult to quit because a chemical in tobacco, called nicotine, causes addiction or dependence. When you smoke and inhale, nicotine is absorbed rapidly into the bloodstream through your lungs. Both inhaled and non-inhaled nicotine may be addictive.  What are the risks of cigarette smoke?  Cigarette smokers have an increased risk of many serious medical problems, including:  · Lung cancer.  · Lung disease, such as pneumonia, bronchitis, and emphysema.  · Chest pain (angina) and heart attack because the heart is not getting enough oxygen.  · Heart disease and peripheral blood vessel disease.  · High blood pressure (hypertension).  · Stroke.  · Oral cancer, including cancer of the lip, mouth, or voice box.  · Bladder cancer.  · Pancreatic cancer.  · Cervical cancer.  · Pregnancy complications, including premature birth.  · Stillbirths and smaller  babies, birth defects, and genetic damage to sperm.  · Early menopause.  · Lower estrogen level for women.  · Infertility.  · Facial wrinkles.  · Blindness.  · Increased risk of broken bones (fractures).  · Senile dementia.  · Stomach ulcers and internal bleeding.  · Delayed wound healing and increased risk of complications during surgery.  · Even smoking lightly shortens your life expectancy by several years.  Because of secondhand smoke exposure, children of smokers have an increased risk of the following:  · Sudden infant death syndrome (SIDS).  · Respiratory infections.  · Lung cancer.  · Heart disease.  · Ear infections.  What are the benefits of quitting?  There are many health benefits of quitting smoking. Here are some of them:  · Within days of quitting smoking, your risk of having a heart attack decreases, your blood flow improves, and your lung capacity improves. Blood pressure, pulse rate, and breathing patterns start returning to normal soon  after quitting.  · Within months, your lungs may clear up completely.  · Quitting for 10 years reduces your risk of developing lung cancer and heart disease to almost that of a nonsmoker.  · People who quit may see an improvement in their overall quality of life.  How do I quit smoking?         Smoking is an addiction with both physical and psychological effects, and longtime habits can be hard to change. Your health care provider can recommend:  · Programs and community resources, which may include group support, education, or talk therapy.  · Prescription medicines to help reduce cravings.  · Nicotine replacement products, such as patches, gum, and nasal sprays. Use these products only as directed. Do not replace cigarette smoking with electronic cigarettes, which are commonly called e-cigarettes. The safety of e-cigarettes is not known, and some may contain harmful chemicals.  · A combination of two or more of these methods.  Where to find more information  · American Lung Association: www.lung.org  · American Cancer Society: www.cancer.org  Summary  · Smoking cigarettes is very bad for your health. Cigarette smokers have an increased risk of many serious medical problems, including several cancers, heart disease, and stroke.  · Smoking is an addiction with both physical and psychological effects, and longtime habits can be hard to change.  · By stopping right away, you can greatly reduce the risk of medical problems for you and your family.  · To help you quit smoking, your health care provider can recommend programs, community resources, prescription medicines, and nicotine replacement products such as patches, gum, and nasal sprays.  This information is not intended to replace advice given to you by your health care provider. Make sure you discuss any questions you have with your health care provider.  Document Revised: 03/21/2019 Document Reviewed: 12/22/2017  [employer] Patient Education © 2021 [employer]  Obesity, Adult  Obesity is having too much body fat. Being obese means that your weight is more than what is healthy for you.  BMI is a number that explains how much body fat you have. If you have a BMI of 30 or more, you are obese. Obesity is often caused by eating or drinking more calories than your body uses. Changing your lifestyle can help you lose weight.  Obesity can cause serious health problems, such as:  · Stroke.  · Coronary artery disease (CAD).  · Type 2 diabetes.  · Some types of cancer, including cancers of the colon, breast, uterus, and gallbladder.  · Osteoarthritis.  · High blood pressure (hypertension).  · High cholesterol.  · Sleep apnea.  · Gallbladder stones.  · Infertility problems.  What are the causes?  · Eating meals each day that are high in calories, sugar, and fat.  · Being born with genes that may make you more likely to become obese.  · Having a medical condition that causes obesity.  · Taking certain medicines.  · Sitting a lot (having a sedentary lifestyle).  · Not getting enough sleep.  · Drinking a lot of drinks that have sugar in them.  What increases the risk?  · Having a family history of obesity.  · Being an  woman.  · Being a  man.  · Living in an area with limited access to:  ? Pepper, recreation centers, or sidewalks.  ? Healthy food choices, such as grocery stores and [employer] markets.  What are the signs or symptoms?  The main sign is having too much body fat.  How is this treated?  · Treatment for this condition often includes changing your lifestyle. Treatment may include:  ? Changing your diet. This may include making a healthy meal plan.  ? Exercise. This may include activity that causes your heart to beat faster (aerobic exercise) and strength training. Work with your doctor to design a program that works for you.  ? Medicine to help you lose weight. This may be used if you are not able to lose 1 pound a week after 6 weeks of healthy  eating and more exercise.  ? Treating conditions that cause the obesity.  ? Surgery. Options may include gastric banding and gastric bypass. This may be done if:  § Other treatments have not helped to improve your condition.  § You have a BMI of 40 or higher.  § You have life-threatening health problems related to obesity.  Follow these instructions at home:  Eating and drinking    · Follow advice from your doctor about what to eat and drink. Your doctor may tell you to:  ? Limit fast food, sweets, and processed snack foods.  ? Choose low-fat options. For example, choose low-fat milk instead of whole milk.  ? Eat 5 or more servings of fruits or vegetables each day.  ? Eat at home more often. This gives you more control over what you eat.  ? Choose healthy foods when you eat out.  ? Learn to read food labels. This will help you learn how much food is in 1 serving.  ? Keep low-fat snacks available.  ? Avoid drinks that have a lot of sugar in them. These include soda, fruit juice, iced tea with sugar, and flavored milk.  · Drink enough water to keep your pee (urine) pale yellow.  · Do not go on fad diets.  Physical activity  · Exercise often, as told by your doctor. Most adults should get up to 150 minutes of moderate-intensity exercise every week.Ask your doctor:  ? What types of exercise are safe for you.  ? How often you should exercise.  · Warm up and stretch before being active.  · Do slow stretching after being active (cool down).  · Rest between times of being active.  Lifestyle  · Work with your doctor and a food expert (dietitian) to set a weight-loss goal that is best for you.  · Limit your screen time.  · Find ways to reward yourself that do not involve food.  · Do not drink alcohol if:  ? Your doctor tells you not to drink.  ? You are pregnant, may be pregnant, or are planning to become pregnant.  · If you drink alcohol:  ? Limit how much you use to:  § 0-1 drink a day for women.  § 0-2 drinks a day for  men.  ? Be aware of how much alcohol is in your drink. In the [place] one drink equals one 12 oz bottle of beer (355 mL), one 5 oz glass of wine (148 mL), or one 1½ oz glass of hard liquor (44 mL).  General instructions  · Keep a weight-loss journal. This can help you keep track of:  ? The food that you eat.  ? How much exercise you get.  · Take over-the-counter and prescription medicines only as told by your doctor.  · Take vitamins and supplements only as told by your doctor.  · Think about joining a support group.  · Keep all follow-up visits as told by your doctor. This is important.  Contact a doctor if:  · You cannot meet your weight loss goal after you have changed your diet and lifestyle for 6 weeks.  Get help right away if you:  · Are having trouble breathing.  · Are having thoughts of harming yourself.  Summary  · Obesity is having too much body fat.  · Being obese means that your weight is more than what is healthy for you.  · Work with your doctor to set a weight-loss goal.  · Get regular exercise as told by your doctor.  This information is not intended to replace advice given to you by your health care provider. Make sure you discuss any questions you have with your health care provider.  Document Revised: 08/22/2019 Document Reviewed: 08/22/2019  [employer] Patient Education © 2021 [employer]

## 2021-04-08 NOTE — H&P (VIEW-ONLY)
Subjective:     Encounter Date: 04/08/2021      Patient ID: Akua Aguilar is a 60 y.o. female   HPI: Her visit today is for routine follow up of outpatient testing.  Elyssa scan on 3/25/2021 revealed small sized area of ischemia in the septal wall.  She reports improvement in blood pressures. She denies any additional episodes of chest pain since her last visit. Patient has a history of nonobstructive coronary artery disease, hypertension, hyperlipidemia, left ventricular hypertrophy, enlargement of abdominal aorta, GERD, and smoking. US of aorta 8/14/2020 at [facility] revealed no evidence of AAA.  She continues to complain of abdominal distention. She reports chronic orthopnea. Patient denies palpitations,  dizziness, syncope, PND and decreased stamina. Patient denies signs of bleeding.    Chief Complaint: routine follow up  Hypertension  This is a chronic problem. The current episode started more than 1 year ago. The problem is uncontrolled. Associated symptoms include malaise/fatigue and orthopnea. Pertinent negatives include no anxiety, blurred vision, chest pain, headaches, neck pain, palpitations, peripheral edema, PND, shortness of breath or sweats. There are no associated agents to hypertension. Risk factors for coronary artery disease include dyslipidemia, post-menopausal state and obesity. Current antihypertension treatment includes alpha 1 blockers, diuretics, calcium channel blockers, beta blockers and ACE inhibitors. There is no history of angina, kidney disease, CAD/MI, CVA, heart failure, left ventricular hypertrophy, PVD or retinopathy. There is no history of chronic renal disease, coarctation of the aorta, hyperaldosteronism, hypercortisolism, hyperparathyroidism, a hypertension causing med, pheochromocytoma, renovascular disease, sleep apnea or a thyroid problem.   Hyperlipidemia  This is a chronic problem. The current episode started more than 1 year ago. The problem is uncontrolled.  Recent lipid tests were reviewed and are high. Exacerbating diseases include obesity. She has no history of chronic renal disease, diabetes, hypothyroidism, liver disease or nephrotic syndrome. Pertinent negatives include no chest pain, focal sensory loss, focal weakness, leg pain, myalgias or shortness of breath. She is currently on no antihyperlipidemic treatment. Risk factors for coronary artery disease include dyslipidemia, hypertension and post-menopausal.       Previous Cardiac Testing:  Results for orders placed during the hospital encounter of 02/25/21    Adult Transthoracic Echo Complete w/ Color, Spectral and Contrast if necessary per protocol    Interpretation Summary  · Left ventricular wall thickness is consistent with mild concentric hypertrophy.  · Left ventricular ejection fraction appears to be 56 - 60%. Left ventricular systolic function is normal.  · Abnormal global longitudinal LV strain (GLS) = -12%  · Left ventricular diastolic function was indeterminate.  · Mild pulmonary hypertension is present.    Results for orders placed during the hospital encounter of 06/19/20   Cardiac Catheterization/Vascular Study    Narrative Cardiac Catheterization Operative Report    Patient was referred for cardiac catheterization .   Indications for the procedure include: Symptoms suggestive of angina with   high suspicion for significant obstructive coronary artery disease   With ongoing chest pain    Procedure performed    Coronary angiography  Left heart catheterization  Left ventriculography  Fractional flow reserve of the left anterior descending coronary artery  Supervision of the administration of moderate sedation    Fractional flow reserve of left anterior descending coronary artery using   Ikari 3.56 Uzbek guide  Usual protocol was followed.  Adenosine was used to the or hyperemia.    Guide used as mentioned above.  The guide was advanced and the ostium of the vessel was engaged.  We   zeroed the  status post aortic pressure then we advanced a fractional flow   reserve wire.  This was equalized prior to crossing the lesion.  Then we   crossed the lesion.  Anticoagulation was used prior to insertion of the   wire.  After crossing the lesion adenosine was used.  This caused   hyperemia.  Fractional flow reserve was then performed and documented as   noted.  End of case the wire was removed removed angiography was performed   before and after removing the wire to document no complication and decide   results were achieved.       Procedure Details  The risks, benefits, complications, treatment options, and expected   outcomes were discussed with the patient. Plan is for moderate sedation,   and the patient agrees to proceed with the procedure.  An immediate   assessment was done prior to the administration of moderate sedation.     The patient and/or family concurred with the proposed plan, giving   informed consent. Patient was brought to the cath lab after IV hydration   was begun and oral premedication was given. The was further sedated with   Fentanyl  and Versed.    The patient was prepped and draped in the usual manner. Using the modified   Seldinger access technique, a 6f Romanian sheath was placed in the right   radial  artery.   A left heart catheterization was done. Angiograms were also done.        Cardiac Catheterization Operative Report      Patient was referred for cardiac catheterization: High suspicion for   coronary artery disease    Procedure Details  The risks, benefits, complications, treatment options, and expected   outcomes were discussed with the patient. Plan is for moderate sedation,   and the patient agrees to proceed with the procedure.  An immediate   assessment was done prior to the administration of moderate sedation.    The patient and/or family concurred with the proposed plan, giving   informed consent. Patient was brought to the cath lab after IV hydration   was begun and oral  premedication was given.     The skin overlying the patient's right radial artery was prepped and   draped in the usual sterile fashion.  Timeout was taken to confirm the   correct patient and procedure.  Lidocaine was administered for local   anesthesia.  IV Versed and fentanyl were used to achieve conscious   sedation.  Modified Seldinger technique was then used to place a 6 Georgian   sheath in the right radial artery    Diagnostic coronary angiography was performed with Tig coronary catheter.    Coronary angiogram were performed in Tuvaluan and JIMÉNEZ projection to evaluate   the coronary arterial systems.      A TR band device was used to achieve hemostasis at the end of the   procedure.  The patient tolerated the procedure well, and there were no   immediate complications.    Procedural Details: After written and informed consent was obtained, the   patient was brought to the cath lab in a fasting state.       1. Selective coronary angiography:    Left main coronary artery:  The left main coronary artery arises from the   left coronary cusp and bifurcates into the  left anterior descending   coronary artery  and left circumflex arteries.      Left anterior descending artery:  The  left anterior descending coronary   artery   arises normally from the left main coronary artery and courses in   the anterior interventricular groove and terminates at the apex.  50%   stenosis noted of the proximal left anterior descending coronary artery   with large circumferential soft atherosclerotic plaque  Normal fractional flow reserve of 0.9 or higher    Left circumflex:  The left circumflex arises form the left man artery and   supplies obtuse marginal branches.  Minor atherosclerotic plaque noted in   the mid left circumflex coronary artery which is dominant and large    Right coronary artery:  The right coronary artery  arises normally from   the right coronary cusp and is dominant for the posterior circulation.    The right  coronary artery  is non-dominant and normal.    2. Left heart cath: Moderately elevated left ventricular end-diastolic   pressure of 24 mmHg.    3. LV Gram: Normal left ventricular ejection fraction of 55% with no   regional wall motion abnormalities.    4. Interventions: None    Estimated Blood Loss:  Minimal    Specimens: None         Complications:  None; patient tolerated the procedure well.           Disposition: Cardiovascular observation unit           Condition: stable            I supervised the administration of conscious sedation by nursing staff   throughout the case.  First dose was given at 1119 and the end of my   face-to-face encounter was at 1145 Hours.    During the case, continuous pulse oximetry, heart rate, blood pressure,   and patient status were monitored.       Risk, Benefits, and Alternatives discussed with the patient and/or family.    Plan is for moderate sedation, and the patient agrees to proceed with the   procedure.  An immediate assessment was done prior to the administration   of moderate sedation      Conclusion    50% stenosis of proximal left anterior descending coronary artery with a   normal fractional flow reserve 0.9 or higher with a large circumferential   plaque burden for which aggressive medical therapy is advised  Large dominant left circumflex coronary artery with minor atherosclerotic   plaque in the midsegment without any significant lesions  Right coronary artery is nondominant small and normal  Moderately elevated left ventricular end-diastolic pressure of 24 mmHg  Normal left ventricular ejection fraction of 55% without any discrete wall   motion abnormalities and no significant mitral regurgitation        Plan    Keep LDL well below 70 mg/dL  Treat for elevated left ventricular end-diastolic pressure  Can be discharged home today after period of observation  Hydration   Observation  Risk factor modifications           Abnormal coronary CT angiography with 25 to  49% stenosis of the left   anterior descending coronary artery         The following portions of the patient's history were reviewed and updated as appropriate: allergies, current medications, past family history, past medical history, past social history, past surgical history and problem list.    Allergies   Allergen Reactions   • Aspirin GI Intolerance     Nausea and vomitting   • Latex Rash     Blisters with tape and intolerance with gloves       Current Outpatient Medications:   •  albuterol sulfate  (90 Base) MCG/ACT inhaler, Inhale 2 puffs Every 6 (Six) Hours As Needed., Disp: , Rfl:   •  amLODIPine (NORVASC) 10 MG tablet, Take 1 tablet by mouth Daily., Disp: 90 tablet, Rfl: 4  •  ARIPiprazole (ABILIFY) 10 MG tablet, Take 10 mg by mouth every night at bedtime., Disp: , Rfl:   •  atenolol (TENORMIN) 50 MG tablet, Take 50 mg by mouth Daily., Disp: , Rfl:   •  atorvastatin (LIPITOR) 10 MG tablet, Take 1 tablet by mouth Daily., Disp: 30 tablet, Rfl: 11  •  Blood Pressure Monitoring (SPHYGMOMANOMETER) misc, 1 Units 2 (Two) Times a Day., Disp: 1 each, Rfl: 0  •  carvedilol (COREG) 25 MG tablet, Take 1 tablet by mouth 2 (Two) Times a Day., Disp: 180 tablet, Rfl: 3  •  clonazePAM (KlonoPIN) 0.5 MG tablet, Take 0.5 mg by mouth 2 (Two) Times a Day., Disp: , Rfl:   •  doxepin (SINEquan) 25 MG capsule, 1 CAPSULE BY MOUTH AT BEDTIME FILL ON OR AFTER 1/4/2018, Disp: , Rfl: 1  •  gabapentin (NEURONTIN) 300 MG capsule, Take 300 mg by mouth 2 (two) times a day., Disp: , Rfl:   •  gabapentin (NEURONTIN) 600 MG tablet, Take 600 mg by mouth Daily., Disp: , Rfl:   •  hydrALAZINE (APRESOLINE) 25 MG tablet, Take 1 tablet by mouth 3 (Three) Times a Day., Disp: 270 tablet, Rfl: 3  •  hydroCHLOROthiazide (HYDRODIURIL) 50 MG tablet, Take 1 tablet by mouth Daily., Disp: 90 tablet, Rfl: 3  •  HYDROcodone-acetaminophen (NORCO)  MG per tablet, Take 1 tablet by mouth 3 (Three) Times a Day., Disp: , Rfl:   •  hydrOXYzine pamoate  (VISTARIL) 25 MG capsule, Take 25 mg by mouth Every 8 (Eight) Hours As Needed., Disp: , Rfl:   •  levETIRAcetam (KEPPRA) 500 MG tablet, Take 500 mg by mouth 2 (Two) Times a Day., Disp: , Rfl:   •  lisinopril (PRINIVIL,ZESTRIL) 40 MG tablet, TAKE 1 TABLET BY MOUTH TWICE A DAY, Disp: 60 tablet, Rfl: 7  •  nitroglycerin (NITROSTAT) 0.4 MG SL tablet, Take no more than 3 doses in 15 minutes., Disp: 30 tablet, Rfl: 3  •  Potassium 99 MG tablet, Take  by mouth., Disp: , Rfl:   •  SUMAtriptan (IMITREX) 50 MG tablet, 1 (One) Time As Needed., Disp: , Rfl:   •  vitamin D (ERGOCALCIFEROL) 35848 units capsule capsule, Take 50,000 Units by mouth Every 7 (Seven) Days., Disp: , Rfl:   •  cloNIDine (CATAPRES) 0.1 MG tablet, Take 1 tablet by mouth 2 (Two) Times a Day As Needed for High Blood Pressure., Disp: 180 tablet, Rfl: 3  •  cloNIDine (CATAPRES-TTS) 0.3 MG/24HR patch, Place 1 patch on the skin as directed by provider 1 (One) Time Per Week., Disp: 12 patch, Rfl: 3  Past Medical History:   Diagnosis Date   • Arthritis    • Cancer (CMS/HCC)     CREVICAL, UTERINE   • COPD (chronic obstructive pulmonary disease) (CMS/HCC)    • Fatty liver    • GERD (gastroesophageal reflux disease)    • History of transfusion    • Hypertension    • Kidney stone    • Low back pain potentially associated with spinal stenosis    • Lupus (CMS/HCC)    • Migraines    • Scoliosis    • Seizure (CMS/HCC) 2021   • Shortness of breath    • UTI (urinary tract infection)      Past Surgical History:   Procedure Laterality Date   • BACK SURGERY      lumbar   • CARDIAC CATHETERIZATION     • CARDIAC CATHETERIZATION N/A 2020    Procedure: Cardiac Catheterization/Vascular Study Radial cath;  Surgeon: Robert Padilla MD;  Location:  [facility] CATH INVASIVE LOCATION;  Service: Cardiology;  Laterality: N/A;   • CARDIAC DEFIBRILLATOR PLACEMENT     •  SECTION     • CHOLECYSTECTOMY     • COLON RESECTION       or    • COLONOSCOPY     • COLONOSCOPY  N/A 5/10/2018    Procedure: COLONOSCOPY WITH ANESTHESIA;  Surgeon: Shawna Valladares MD;  Location: [facility] ENDOSCOPY;  Service: Gastroenterology   • ENDOSCOPY N/A 5/10/2018    Procedure: ESOPHAGOGASTRODUODENOSCOPY WITH ANESTHESIA;  Surgeon: Shawna Valladares MD;  Location: [facility] ENDOSCOPY;  Service: Gastroenterology   • EYE SURGERY      x 2   • HYSTERECTOMY     • LEG SURGERY Right     bars/bolts placed   • UPPER GASTROINTESTINAL ENDOSCOPY     • URETEROSCOPY LASER LITHOTRIPSY WITH STENT INSERTION Right 2018    Procedure: URETEROSCOPY  WITH STENT INSERTION RETROGRADE PYELOGRAM;  Surgeon: Tyrell Hardin MD;  Location: [facility] OR;  Service: Urology     Family History   Problem Relation Age of Onset   • Cancer Mother    • Heart disease Mother    • Heart disease Father    • Lupus Sister    • Heart disease Sister    • Heart disease Sister    • Lupus Sister    • Hypokalemia Sister    • Alcohol abuse Sister    • Colon cancer Neg Hx    • Colon polyps Neg Hx      Social History     Socioeconomic History   • Marital status:      Spouse name: Not on file   • Number of children: Not on file   • Years of education: Not on file   • Highest education level: Not on file   Tobacco Use   • Smoking status: Current Every Day Smoker     Packs/day: 1.00     Years: 40.00     Pack years: 40.00     Types: Cigarettes   • Smokeless tobacco: Never Used   • Tobacco comment: started smoking more since son    Vaping Use   • Vaping Use: Never used   Substance and Sexual Activity   • Alcohol use: Not Currently     Comment: quit    • Drug use: Never   • Sexual activity: Defer       Review of Systems   Constitutional: Positive for malaise/fatigue and weight gain. Negative for chills, decreased appetite, diaphoresis, fever, night sweats and weight loss.   HENT: Negative for nosebleeds.    Eyes: Negative for blurred vision and visual disturbance.   Cardiovascular: Positive for orthopnea. Negative for chest pain, claudication,  cyanosis, dyspnea on exertion, irregular heartbeat, leg swelling, near-syncope, palpitations, paroxysmal nocturnal dyspnea and syncope.   Respiratory: Negative for cough, hemoptysis, shortness of breath, snoring and wheezing.    Endocrine: Negative for cold intolerance and heat intolerance.   Hematologic/Lymphatic: Negative for bleeding problem. Does not bruise/bleed easily.   Skin: Negative for dry skin and rash.   Musculoskeletal: Negative for back pain, falls, myalgias, neck pain and stiffness.   Gastrointestinal: Positive for bloating. Negative for abdominal pain, change in bowel habit, constipation, diarrhea, dysphagia, heartburn, nausea and vomiting.   Genitourinary: Negative for hematuria.   Neurological: Negative for dizziness, focal weakness, headaches, light-headedness, numbness and weakness.   Psychiatric/Behavioral: Negative for altered mental status. The patient does not have insomnia.    Allergic/Immunologic: Negative for persistent infections.              Objective:     Vitals and nursing note reviewed.   Constitutional:       General: Not in acute distress.     Appearance: Normal and healthy appearance. Well-developed, normal weight and not in distress. Not diaphoretic.   Eyes:      General: Lids are normal.         Right eye: No discharge.         Left eye: No discharge.      Conjunctiva/sclera: Conjunctivae normal.      Pupils: Pupils are equal, round, and reactive to light.   HENT:      Head: Normocephalic and atraumatic.      Jaw: There is normal jaw occlusion.      Right Ear: External ear normal.      Left Ear: External ear normal.      Nose: Nose normal.   Neck:      Thyroid: No thyromegaly.      Vascular: JVD present. No carotid bruit or JVR. JVD normal.      Trachea: Trachea normal. No tracheal deviation.   Pulmonary:      Effort: Pulmonary effort is normal. No respiratory distress.      Breath sounds: Examination of the right-lower field reveals decreased breath sounds. Examination of the  "left-lower field reveals decreased breath sounds. Decreased breath sounds present. No wheezing. No rhonchi. No rales.   Chest:      Chest wall: Not tender to palpatation.   Cardiovascular:      PMI at left midclavicular line. Normal rate. Regular rhythm. Normal S1. Normal S2.      Murmurs: There is no murmur.      No gallop. No click. No rub.   Pulses:     Intact distal pulses. No decreased pulses.   Edema:     Peripheral edema present.  Abdominal:      General: Bowel sounds are normal. There is distension.      Palpations: Abdomen is soft.      Tenderness: There is no abdominal tenderness.   Musculoskeletal: Normal range of motion.         General: No tenderness or deformity.      Cervical back: Normal range of motion and neck supple. Skin:     General: Skin is warm and dry.      Coloration: Skin is not pale.      Findings: No erythema or rash.   Neurological:      General: No focal deficit present.      Mental Status: Alert, oriented to person, place, and time and oriented to person, place and time.   Psychiatric:         Attention and Perception: Attention and perception normal.         Mood and Affect: Mood and affect normal.         Speech: Speech normal.         Behavior: Behavior normal.         Thought Content: Thought content normal.         Cognition and Memory: Cognition and memory normal.         Judgment: Judgment normal.           Procedures  /80 (BP Location: Right arm, Patient Position: Sitting, Cuff Size: Adult)   Pulse 79   Ht 160 cm (62.99\")   Wt 89.8 kg (198 lb)   SpO2 95%   BMI 35.08 kg/m²   Lab Review:   Lab Results   Component Value Date    WBC 8.80 06/19/2020    HGB 16.6 (H) 06/19/2020    HCT 51.4 (H) 06/19/2020    MCV 88.9 06/19/2020     06/19/2020     Lab Results   Component Value Date    GLUCOSE 84 02/24/2021    BUN 20 02/24/2021    CREATININE 0.60 02/25/2021    EGFRIFNONA 120 02/24/2021    BCR 38.5 (H) 02/24/2021    K 4.0 02/24/2021    CO2 30.0 (H) 02/24/2021    CALCIUM " 9.5 02/24/2021    ALBUMIN 4.20 02/24/2021    AST 13 02/24/2021    ALT 13 02/24/2021     Lab Results   Component Value Date    CHOL 165 02/24/2021    CHOL 186 10/26/2017    CHLPL 232 (H) 04/19/2016     Lab Results   Component Value Date    TRIG 227 (H) 02/24/2021    TRIG 185 (H) 10/26/2017    TRIG 267 (H) 04/19/2016     Lab Results   Component Value Date    HDL 60 02/24/2021    HDL 55 10/26/2017    HDL 57 04/19/2016     Lab Results   Component Value Date    LDL 68 02/24/2021     (H) 10/26/2017     (H) 04/19/2016       I have reviewed the most recent lab results.       Assessment:          Diagnosis Plan   1. Nonobstructive atherosclerosis of coronary artery  Pt to be scheduled for left heart catheterization.    2. Essential hypertension  Blood pressures have been well controlled.     3. Mixed hyperlipidemia   cholesterol well controlled.  LDL 68 on lipid panel 2/24/2021.  Continue Lipitor.   4. Enlargement of abdominal aorta (CMS/HCC) small infra renal  US aorta 8/14/2020 - no evidence of AAA   5. LVH (left ventricular hypertrophy) mild   mild on echo 2/25/2021.   6. Gastroesophageal reflux disease without esophagitis  Managed by PCP.   7. Lupus (CMS/HCC) Follows with rheumatology.  Normal sed rate and CRP 2/24/2021.   8. Tobacco abuse  Akua Aguilar  reports that she has been smoking cigarettes. She has a 40.00 pack-year smoking history. She has never used smokeless tobacco.. I have educated her on the risk of diseases from using tobacco products such as cancer, COPD and heart diease.     I advised her to quit and she is not willing to quit.    I spent 3  minutes counseling the patient.          9. Class 1 obesity due to excess calories with serious comorbidity and body mass index (BMI) of 34.0 to 34.9 in adult Patient's Body mass index is 35.08 kg/m². BMI is above normal parameters. Recommendations include: educational material.            Plan:       1. Continue medications as previously  prescribed.  2. Report any worsening symptoms.  3. Report any signs of bleeding.  4. Continue heart healthy diet and regular exercise as tolerated.   5. Follow up with PCP for blood pressure and cholesterol management and routine lab work.  6.  Monitor and record daily blood pressure. Report readings consistently higher than 140/90 or consistently lower than 100/60.   7. Follow up in one month, or sooner if needed.

## 2021-04-08 NOTE — PROGRESS NOTES
Subjective:     Encounter Date: 04/08/2021      Patient ID: Akua Aguilar is a 60 y.o. female   HPI: Her visit today is for routine follow up of outpatient testing.  Elyssa scan on 3/25/2021 revealed small sized area of ischemia in the septal wall.  She reports improvement in blood pressures. She denies any additional episodes of chest pain since her last visit. Patient has a history of nonobstructive coronary artery disease, hypertension, hyperlipidemia, left ventricular hypertrophy, enlargement of abdominal aorta, GERD, and smoking. US of aorta 8/14/2020 at [facility] revealed no evidence of AAA.  She continues to complain of abdominal distention. She reports chronic orthopnea. Patient denies palpitations,  dizziness, syncope, PND and decreased stamina. Patient denies signs of bleeding.    Chief Complaint: routine follow up  Hypertension  This is a chronic problem. The current episode started more than 1 year ago. The problem is uncontrolled. Associated symptoms include malaise/fatigue and orthopnea. Pertinent negatives include no anxiety, blurred vision, chest pain, headaches, neck pain, palpitations, peripheral edema, PND, shortness of breath or sweats. There are no associated agents to hypertension. Risk factors for coronary artery disease include dyslipidemia, post-menopausal state and obesity. Current antihypertension treatment includes alpha 1 blockers, diuretics, calcium channel blockers, beta blockers and ACE inhibitors. There is no history of angina, kidney disease, CAD/MI, CVA, heart failure, left ventricular hypertrophy, PVD or retinopathy. There is no history of chronic renal disease, coarctation of the aorta, hyperaldosteronism, hypercortisolism, hyperparathyroidism, a hypertension causing med, pheochromocytoma, renovascular disease, sleep apnea or a thyroid problem.   Hyperlipidemia  This is a chronic problem. The current episode started more than 1 year ago. The problem is uncontrolled.  Recent lipid tests were reviewed and are high. Exacerbating diseases include obesity. She has no history of chronic renal disease, diabetes, hypothyroidism, liver disease or nephrotic syndrome. Pertinent negatives include no chest pain, focal sensory loss, focal weakness, leg pain, myalgias or shortness of breath. She is currently on no antihyperlipidemic treatment. Risk factors for coronary artery disease include dyslipidemia, hypertension and post-menopausal.       Previous Cardiac Testing:  Results for orders placed during the hospital encounter of 02/25/21    Adult Transthoracic Echo Complete w/ Color, Spectral and Contrast if necessary per protocol    Interpretation Summary  · Left ventricular wall thickness is consistent with mild concentric hypertrophy.  · Left ventricular ejection fraction appears to be 56 - 60%. Left ventricular systolic function is normal.  · Abnormal global longitudinal LV strain (GLS) = -12%  · Left ventricular diastolic function was indeterminate.  · Mild pulmonary hypertension is present.    Results for orders placed during the hospital encounter of 06/19/20   Cardiac Catheterization/Vascular Study    Narrative Cardiac Catheterization Operative Report    Patient was referred for cardiac catheterization .   Indications for the procedure include: Symptoms suggestive of angina with   high suspicion for significant obstructive coronary artery disease   With ongoing chest pain    Procedure performed    Coronary angiography  Left heart catheterization  Left ventriculography  Fractional flow reserve of the left anterior descending coronary artery  Supervision of the administration of moderate sedation    Fractional flow reserve of left anterior descending coronary artery using   Ikari 3.56 Haitian guide  Usual protocol was followed.  Adenosine was used to the or hyperemia.    Guide used as mentioned above.  The guide was advanced and the ostium of the vessel was engaged.  We   zeroed the  status post aortic pressure then we advanced a fractional flow   reserve wire.  This was equalized prior to crossing the lesion.  Then we   crossed the lesion.  Anticoagulation was used prior to insertion of the   wire.  After crossing the lesion adenosine was used.  This caused   hyperemia.  Fractional flow reserve was then performed and documented as   noted.  End of case the wire was removed removed angiography was performed   before and after removing the wire to document no complication and decide   results were achieved.       Procedure Details  The risks, benefits, complications, treatment options, and expected   outcomes were discussed with the patient. Plan is for moderate sedation,   and the patient agrees to proceed with the procedure.  An immediate   assessment was done prior to the administration of moderate sedation.     The patient and/or family concurred with the proposed plan, giving   informed consent. Patient was brought to the cath lab after IV hydration   was begun and oral premedication was given. The was further sedated with   Fentanyl  and Versed.    The patient was prepped and draped in the usual manner. Using the modified   Seldinger access technique, a 6f Syriac sheath was placed in the right   radial  artery.   A left heart catheterization was done. Angiograms were also done.        Cardiac Catheterization Operative Report      Patient was referred for cardiac catheterization: High suspicion for   coronary artery disease    Procedure Details  The risks, benefits, complications, treatment options, and expected   outcomes were discussed with the patient. Plan is for moderate sedation,   and the patient agrees to proceed with the procedure.  An immediate   assessment was done prior to the administration of moderate sedation.    The patient and/or family concurred with the proposed plan, giving   informed consent. Patient was brought to the cath lab after IV hydration   was begun and oral  premedication was given.     The skin overlying the patient's right radial artery was prepped and   draped in the usual sterile fashion.  Timeout was taken to confirm the   correct patient and procedure.  Lidocaine was administered for local   anesthesia.  IV Versed and fentanyl were used to achieve conscious   sedation.  Modified Seldinger technique was then used to place a 6 Yemeni   sheath in the right radial artery    Diagnostic coronary angiography was performed with Tig coronary catheter.    Coronary angiogram were performed in Anguillan and JIMÉNEZ projection to evaluate   the coronary arterial systems.      A TR band device was used to achieve hemostasis at the end of the   procedure.  The patient tolerated the procedure well, and there were no   immediate complications.    Procedural Details: After written and informed consent was obtained, the   patient was brought to the cath lab in a fasting state.       1. Selective coronary angiography:    Left main coronary artery:  The left main coronary artery arises from the   left coronary cusp and bifurcates into the  left anterior descending   coronary artery  and left circumflex arteries.      Left anterior descending artery:  The  left anterior descending coronary   artery   arises normally from the left main coronary artery and courses in   the anterior interventricular groove and terminates at the apex.  50%   stenosis noted of the proximal left anterior descending coronary artery   with large circumferential soft atherosclerotic plaque  Normal fractional flow reserve of 0.9 or higher    Left circumflex:  The left circumflex arises form the left man artery and   supplies obtuse marginal branches.  Minor atherosclerotic plaque noted in   the mid left circumflex coronary artery which is dominant and large    Right coronary artery:  The right coronary artery  arises normally from   the right coronary cusp and is dominant for the posterior circulation.    The right  coronary artery  is non-dominant and normal.    2. Left heart cath: Moderately elevated left ventricular end-diastolic   pressure of 24 mmHg.    3. LV Gram: Normal left ventricular ejection fraction of 55% with no   regional wall motion abnormalities.    4. Interventions: None    Estimated Blood Loss:  Minimal    Specimens: None         Complications:  None; patient tolerated the procedure well.           Disposition: Cardiovascular observation unit           Condition: stable            I supervised the administration of conscious sedation by nursing staff   throughout the case.  First dose was given at 1119 and the end of my   face-to-face encounter was at 1145 Hours.    During the case, continuous pulse oximetry, heart rate, blood pressure,   and patient status were monitored.       Risk, Benefits, and Alternatives discussed with the patient and/or family.    Plan is for moderate sedation, and the patient agrees to proceed with the   procedure.  An immediate assessment was done prior to the administration   of moderate sedation      Conclusion    50% stenosis of proximal left anterior descending coronary artery with a   normal fractional flow reserve 0.9 or higher with a large circumferential   plaque burden for which aggressive medical therapy is advised  Large dominant left circumflex coronary artery with minor atherosclerotic   plaque in the midsegment without any significant lesions  Right coronary artery is nondominant small and normal  Moderately elevated left ventricular end-diastolic pressure of 24 mmHg  Normal left ventricular ejection fraction of 55% without any discrete wall   motion abnormalities and no significant mitral regurgitation        Plan    Keep LDL well below 70 mg/dL  Treat for elevated left ventricular end-diastolic pressure  Can be discharged home today after period of observation  Hydration   Observation  Risk factor modifications           Abnormal coronary CT angiography with 25 to  49% stenosis of the left   anterior descending coronary artery         The following portions of the patient's history were reviewed and updated as appropriate: allergies, current medications, past family history, past medical history, past social history, past surgical history and problem list.    Allergies   Allergen Reactions   • Aspirin GI Intolerance     Nausea and vomitting   • Latex Rash     Blisters with tape and intolerance with gloves       Current Outpatient Medications:   •  albuterol sulfate  (90 Base) MCG/ACT inhaler, Inhale 2 puffs Every 6 (Six) Hours As Needed., Disp: , Rfl:   •  amLODIPine (NORVASC) 10 MG tablet, Take 1 tablet by mouth Daily., Disp: 90 tablet, Rfl: 4  •  ARIPiprazole (ABILIFY) 10 MG tablet, Take 10 mg by mouth every night at bedtime., Disp: , Rfl:   •  atenolol (TENORMIN) 50 MG tablet, Take 50 mg by mouth Daily., Disp: , Rfl:   •  atorvastatin (LIPITOR) 10 MG tablet, Take 1 tablet by mouth Daily., Disp: 30 tablet, Rfl: 11  •  Blood Pressure Monitoring (SPHYGMOMANOMETER) misc, 1 Units 2 (Two) Times a Day., Disp: 1 each, Rfl: 0  •  carvedilol (COREG) 25 MG tablet, Take 1 tablet by mouth 2 (Two) Times a Day., Disp: 180 tablet, Rfl: 3  •  clonazePAM (KlonoPIN) 0.5 MG tablet, Take 0.5 mg by mouth 2 (Two) Times a Day., Disp: , Rfl:   •  doxepin (SINEquan) 25 MG capsule, 1 CAPSULE BY MOUTH AT BEDTIME FILL ON OR AFTER 1/4/2018, Disp: , Rfl: 1  •  gabapentin (NEURONTIN) 300 MG capsule, Take 300 mg by mouth 2 (two) times a day., Disp: , Rfl:   •  gabapentin (NEURONTIN) 600 MG tablet, Take 600 mg by mouth Daily., Disp: , Rfl:   •  hydrALAZINE (APRESOLINE) 25 MG tablet, Take 1 tablet by mouth 3 (Three) Times a Day., Disp: 270 tablet, Rfl: 3  •  hydroCHLOROthiazide (HYDRODIURIL) 50 MG tablet, Take 1 tablet by mouth Daily., Disp: 90 tablet, Rfl: 3  •  HYDROcodone-acetaminophen (NORCO)  MG per tablet, Take 1 tablet by mouth 3 (Three) Times a Day., Disp: , Rfl:   •  hydrOXYzine pamoate  (VISTARIL) 25 MG capsule, Take 25 mg by mouth Every 8 (Eight) Hours As Needed., Disp: , Rfl:   •  levETIRAcetam (KEPPRA) 500 MG tablet, Take 500 mg by mouth 2 (Two) Times a Day., Disp: , Rfl:   •  lisinopril (PRINIVIL,ZESTRIL) 40 MG tablet, TAKE 1 TABLET BY MOUTH TWICE A DAY, Disp: 60 tablet, Rfl: 7  •  nitroglycerin (NITROSTAT) 0.4 MG SL tablet, Take no more than 3 doses in 15 minutes., Disp: 30 tablet, Rfl: 3  •  Potassium 99 MG tablet, Take  by mouth., Disp: , Rfl:   •  SUMAtriptan (IMITREX) 50 MG tablet, 1 (One) Time As Needed., Disp: , Rfl:   •  vitamin D (ERGOCALCIFEROL) 29309 units capsule capsule, Take 50,000 Units by mouth Every 7 (Seven) Days., Disp: , Rfl:   •  cloNIDine (CATAPRES) 0.1 MG tablet, Take 1 tablet by mouth 2 (Two) Times a Day As Needed for High Blood Pressure., Disp: 180 tablet, Rfl: 3  •  cloNIDine (CATAPRES-TTS) 0.3 MG/24HR patch, Place 1 patch on the skin as directed by provider 1 (One) Time Per Week., Disp: 12 patch, Rfl: 3  Past Medical History:   Diagnosis Date   • Arthritis    • Cancer (CMS/HCC)     CREVICAL, UTERINE   • COPD (chronic obstructive pulmonary disease) (CMS/HCC)    • Fatty liver    • GERD (gastroesophageal reflux disease)    • History of transfusion    • Hypertension    • Kidney stone    • Low back pain potentially associated with spinal stenosis    • Lupus (CMS/HCC)    • Migraines    • Scoliosis    • Seizure (CMS/HCC) 2021   • Shortness of breath    • UTI (urinary tract infection)      Past Surgical History:   Procedure Laterality Date   • BACK SURGERY      lumbar   • CARDIAC CATHETERIZATION     • CARDIAC CATHETERIZATION N/A 2020    Procedure: Cardiac Catheterization/Vascular Study Radial cath;  Surgeon: Robert Padilla MD;  Location:  [facility];  Service: Cardiology;  Laterality: N/A;   • CARDIAC DEFIBRILLATOR PLACEMENT     •  SECTION     • CHOLECYSTECTOMY     • COLON RESECTION       or    • COLONOSCOPY     • COLONOSCOPY  N/A 5/10/2018    Procedure: COLONOSCOPY WITH ANESTHESIA;  Surgeon: Shawna Valladares MD;  Location: [facility];  Service: Gastroenterology   • ENDOSCOPY N/A 5/10/2018    Procedure: ESOPHAGOGASTRODUODENOSCOPY WITH ANESTHESIA;  Surgeon: Shawna Valladares MD;  Location: [facility];  Service: Gastroenterology   • EYE SURGERY      x 2   • HYSTERECTOMY     • LEG SURGERY Right     bars/bolts placed   • UPPER GASTROINTESTINAL ENDOSCOPY     • URETEROSCOPY LASER LITHOTRIPSY WITH STENT INSERTION Right 2018    Procedure: URETEROSCOPY  WITH STENT INSERTION RETROGRADE PYELOGRAM;  Surgeon: Tyrell Hardin MD;  Location: [facility] OR;  Service: Urology     Family History   Problem Relation Age of Onset   • Cancer Mother    • Heart disease Mother    • Heart disease Father    • Lupus Sister    • Heart disease Sister    • Heart disease Sister    • Lupus Sister    • Hypokalemia Sister    • Alcohol abuse Sister    • Colon cancer Neg Hx    • Colon polyps Neg Hx      Social History     Socioeconomic History   • Marital status:      Spouse name: Not on file   • Number of children: Not on file   • Years of education: Not on file   • Highest education level: Not on file   Tobacco Use   • Smoking status: Current Every Day Smoker     Packs/day: 1.00     Years: 40.00     Pack years: 40.00     Types: Cigarettes   • Smokeless tobacco: Never Used   • Tobacco comment: started smoking more since son    Vaping Use   • Vaping Use: Never used   Substance and Sexual Activity   • Alcohol use: Not Currently     Comment: quit    • Drug use: Never   • Sexual activity: Defer       Review of Systems   Constitutional: Positive for malaise/fatigue and weight gain. Negative for chills, decreased appetite, diaphoresis, fever, night sweats and weight loss.   HENT: Negative for nosebleeds.    Eyes: Negative for blurred vision and visual disturbance.   Cardiovascular: Positive for orthopnea. Negative for chest pain, claudication,  cyanosis, dyspnea on exertion, irregular heartbeat, leg swelling, near-syncope, palpitations, paroxysmal nocturnal dyspnea and syncope.   Respiratory: Negative for cough, hemoptysis, shortness of breath, snoring and wheezing.    Endocrine: Negative for cold intolerance and heat intolerance.   Hematologic/Lymphatic: Negative for bleeding problem. Does not bruise/bleed easily.   Skin: Negative for dry skin and rash.   Musculoskeletal: Negative for back pain, falls, myalgias, neck pain and stiffness.   Gastrointestinal: Positive for bloating. Negative for abdominal pain, change in bowel habit, constipation, diarrhea, dysphagia, heartburn, nausea and vomiting.   Genitourinary: Negative for hematuria.   Neurological: Negative for dizziness, focal weakness, headaches, light-headedness, numbness and weakness.   Psychiatric/Behavioral: Negative for altered mental status. The patient does not have insomnia.    Allergic/Immunologic: Negative for persistent infections.              Objective:     Vitals and nursing note reviewed.   Constitutional:       General: Not in acute distress.     Appearance: Normal and healthy appearance. Well-developed, normal weight and not in distress. Not diaphoretic.   Eyes:      General: Lids are normal.         Right eye: No discharge.         Left eye: No discharge.      Conjunctiva/sclera: Conjunctivae normal.      Pupils: Pupils are equal, round, and reactive to light.   HENT:      Head: Normocephalic and atraumatic.      Jaw: There is normal jaw occlusion.      Right Ear: External ear normal.      Left Ear: External ear normal.      Nose: Nose normal.   Neck:      Thyroid: No thyromegaly.      Vascular: JVD present. No carotid bruit or JVR. JVD normal.      Trachea: Trachea normal. No tracheal deviation.   Pulmonary:      Effort: Pulmonary effort is normal. No respiratory distress.      Breath sounds: Examination of the right-lower field reveals decreased breath sounds. Examination of the  "left-lower field reveals decreased breath sounds. Decreased breath sounds present. No wheezing. No rhonchi. No rales.   Chest:      Chest wall: Not tender to palpatation.   Cardiovascular:      PMI at left midclavicular line. Normal rate. Regular rhythm. Normal S1. Normal S2.      Murmurs: There is no murmur.      No gallop. No click. No rub.   Pulses:     Intact distal pulses. No decreased pulses.   Edema:     Peripheral edema present.  Abdominal:      General: Bowel sounds are normal. There is distension.      Palpations: Abdomen is soft.      Tenderness: There is no abdominal tenderness.   Musculoskeletal: Normal range of motion.         General: No tenderness or deformity.      Cervical back: Normal range of motion and neck supple. Skin:     General: Skin is warm and dry.      Coloration: Skin is not pale.      Findings: No erythema or rash.   Neurological:      General: No focal deficit present.      Mental Status: Alert, oriented to person, place, and time and oriented to person, place and time.   Psychiatric:         Attention and Perception: Attention and perception normal.         Mood and Affect: Mood and affect normal.         Speech: Speech normal.         Behavior: Behavior normal.         Thought Content: Thought content normal.         Cognition and Memory: Cognition and memory normal.         Judgment: Judgment normal.           Procedures  /80 (BP Location: Right arm, Patient Position: Sitting, Cuff Size: Adult)   Pulse 79   Ht 160 cm (62.99\")   Wt 89.8 kg (198 lb)   SpO2 95%   BMI 35.08 kg/m²   Lab Review:   Lab Results   Component Value Date    WBC 8.80 06/19/2020    HGB 16.6 (H) 06/19/2020    HCT 51.4 (H) 06/19/2020    MCV 88.9 06/19/2020     06/19/2020     Lab Results   Component Value Date    GLUCOSE 84 02/24/2021    BUN 20 02/24/2021    CREATININE 0.60 02/25/2021    EGFRIFNONA 120 02/24/2021    BCR 38.5 (H) 02/24/2021    K 4.0 02/24/2021    CO2 30.0 (H) 02/24/2021    CALCIUM " 9.5 02/24/2021    ALBUMIN 4.20 02/24/2021    AST 13 02/24/2021    ALT 13 02/24/2021     Lab Results   Component Value Date    CHOL 165 02/24/2021    CHOL 186 10/26/2017    CHLPL 232 (H) 04/19/2016     Lab Results   Component Value Date    TRIG 227 (H) 02/24/2021    TRIG 185 (H) 10/26/2017    TRIG 267 (H) 04/19/2016     Lab Results   Component Value Date    HDL 60 02/24/2021    HDL 55 10/26/2017    HDL 57 04/19/2016     Lab Results   Component Value Date    LDL 68 02/24/2021     (H) 10/26/2017     (H) 04/19/2016       I have reviewed the most recent lab results.       Assessment:          Diagnosis Plan   1. Nonobstructive atherosclerosis of coronary artery  Pt to be scheduled for left heart catheterization.    2. Essential hypertension  Blood pressures have been well controlled.     3. Mixed hyperlipidemia   cholesterol well controlled.  LDL 68 on lipid panel 2/24/2021.  Continue Lipitor.   4. Enlargement of abdominal aorta (CMS/HCC) small infra renal  US aorta 8/14/2020 - no evidence of AAA   5. LVH (left ventricular hypertrophy) mild   mild on echo 2/25/2021.   6. Gastroesophageal reflux disease without esophagitis  Managed by PCP.   7. Lupus (CMS/HCC) Follows with rheumatology.  Normal sed rate and CRP 2/24/2021.   8. Tobacco abuse  Akua Aguilar  reports that she has been smoking cigarettes. She has a 40.00 pack-year smoking history. She has never used smokeless tobacco.. I have educated her on the risk of diseases from using tobacco products such as cancer, COPD and heart diease.     I advised her to quit and she is not willing to quit.    I spent 3  minutes counseling the patient.          9. Class 1 obesity due to excess calories with serious comorbidity and body mass index (BMI) of 34.0 to 34.9 in adult Patient's Body mass index is 35.08 kg/m². BMI is above normal parameters. Recommendations include: educational material.            Plan:       1. Continue medications as previously  prescribed.  2. Report any worsening symptoms.  3. Report any signs of bleeding.  4. Continue heart healthy diet and regular exercise as tolerated.   5. Follow up with PCP for blood pressure and cholesterol management and routine lab work.  6.  Monitor and record daily blood pressure. Report readings consistently higher than 140/90 or consistently lower than 100/60.   7. Follow up in one month, or sooner if needed.

## 2021-04-09 ENCOUNTER — TRANSCRIBE ORDERS (OUTPATIENT)
Dept: LAB | Facility: HOSPITAL | Age: 61
End: 2021-04-09

## 2021-04-09 DIAGNOSIS — Z01.818 PRE-OP TESTING: Primary | ICD-10-CM

## 2021-04-13 ENCOUNTER — TRANSCRIBE ORDERS (OUTPATIENT)
Dept: ADMINISTRATIVE | Facility: HOSPITAL | Age: 61
End: 2021-04-13

## 2021-04-13 ENCOUNTER — LAB (OUTPATIENT)
Dept: LAB | Facility: HOSPITAL | Age: 61
End: 2021-04-13

## 2021-04-13 DIAGNOSIS — Z01.818 PREOP TESTING: Primary | ICD-10-CM

## 2021-04-13 LAB — SARS-COV-2 ORF1AB RESP QL NAA+PROBE: NOT DETECTED

## 2021-04-13 PROCEDURE — U0004 COV-19 TEST NON-CDC HGH THRU: HCPCS | Performed by: PAIN MEDICINE

## 2021-04-13 PROCEDURE — C9803 HOPD COVID-19 SPEC COLLECT: HCPCS | Performed by: PAIN MEDICINE

## 2021-04-16 ENCOUNTER — HOSPITAL ENCOUNTER (OUTPATIENT)
Facility: HOSPITAL | Age: 61
Setting detail: HOSPITAL OUTPATIENT SURGERY
Discharge: HOME | End: 2021-04-16
Attending: INTERNAL MEDICINE | Admitting: INTERNAL MEDICINE

## 2021-04-16 ENCOUNTER — LAB (OUTPATIENT)
Dept: LAB | Facility: HOSPITAL | Age: 61
End: 2021-04-16

## 2021-04-16 VITALS
RESPIRATION RATE: 19 BRPM | OXYGEN SATURATION: 95 % | DIASTOLIC BLOOD PRESSURE: 96 MMHG | SYSTOLIC BLOOD PRESSURE: 130 MMHG | HEIGHT: 63 IN | BODY MASS INDEX: 34.96 KG/M2 | TEMPERATURE: 97.8 F | WEIGHT: 197.3 LBS | HEART RATE: 81 BPM

## 2021-04-16 DIAGNOSIS — I20.0 UNSTABLE ANGINA (HCC): ICD-10-CM

## 2021-04-16 LAB
ANION GAP SERPL CALCULATED.3IONS-SCNC: 10 MMOL/L (ref 5–15)
BUN SERPL-MCNC: 13 MG/DL (ref 8–23)
BUN/CREAT SERPL: 28.3 (ref 7–25)
CALCIUM SPEC-SCNC: 9.4 MG/DL (ref 8.6–10.5)
CHLORIDE SERPL-SCNC: 99 MMOL/L (ref 98–107)
CO2 SERPL-SCNC: 32 MMOL/L (ref 22–29)
CREAT SERPL-MCNC: 0.46 MG/DL (ref 0.57–1)
DEPRECATED RDW RBC AUTO: 46.6 FL (ref 37–54)
ERYTHROCYTE [DISTWIDTH] IN BLOOD BY AUTOMATED COUNT: 14.3 % (ref 12.3–15.4)
GFR SERPL CREATININE-BSD FRML MDRD: 139 ML/MIN/1.73
GLUCOSE SERPL-MCNC: 89 MG/DL (ref 65–99)
HCT VFR BLD AUTO: 45.4 % (ref 34–46.6)
HGB BLD-MCNC: 15 G/DL (ref 12–15.9)
MCH RBC QN AUTO: 29.4 PG (ref 26.6–33)
MCHC RBC AUTO-ENTMCNC: 33 G/DL (ref 31.5–35.7)
MCV RBC AUTO: 89 FL (ref 79–97)
PLATELET # BLD AUTO: 250 10*3/MM3 (ref 140–450)
PMV BLD AUTO: 10 FL (ref 6–12)
POTASSIUM SERPL-SCNC: 3.7 MMOL/L (ref 3.5–5.2)
RBC # BLD AUTO: 5.1 10*6/MM3 (ref 3.77–5.28)
SARS-COV-2 ORF1AB RESP QL NAA+PROBE: NOT DETECTED
SODIUM SERPL-SCNC: 141 MMOL/L (ref 136–145)
WBC # BLD AUTO: 7.39 10*3/MM3 (ref 3.4–10.8)

## 2021-04-16 PROCEDURE — 93458 L HRT ARTERY/VENTRICLE ANGIO: CPT | Performed by: INTERNAL MEDICINE

## 2021-04-16 PROCEDURE — 25010000002 MIDAZOLAM HCL (PF) 5 MG/5ML SOLUTION: Performed by: INTERNAL MEDICINE

## 2021-04-16 PROCEDURE — 25010000002 DIPHENHYDRAMINE PER 50 MG: Performed by: INTERNAL MEDICINE

## 2021-04-16 PROCEDURE — C1887 CATHETER, GUIDING: HCPCS | Performed by: INTERNAL MEDICINE

## 2021-04-16 PROCEDURE — C9803 HOPD COVID-19 SPEC COLLECT: HCPCS

## 2021-04-16 PROCEDURE — 80048 BASIC METABOLIC PNL TOTAL CA: CPT | Performed by: NURSE PRACTITIONER

## 2021-04-16 PROCEDURE — 99153 MOD SED SAME PHYS/QHP EA: CPT | Performed by: INTERNAL MEDICINE

## 2021-04-16 PROCEDURE — 99152 MOD SED SAME PHYS/QHP 5/>YRS: CPT | Performed by: INTERNAL MEDICINE

## 2021-04-16 PROCEDURE — C1760 CLOSURE DEV, VASC: HCPCS | Performed by: INTERNAL MEDICINE

## 2021-04-16 PROCEDURE — C1894 INTRO/SHEATH, NON-LASER: HCPCS | Performed by: INTERNAL MEDICINE

## 2021-04-16 PROCEDURE — U0004 COV-19 TEST NON-CDC HGH THRU: HCPCS | Performed by: INTERNAL MEDICINE

## 2021-04-16 PROCEDURE — C1769 GUIDE WIRE: HCPCS | Performed by: INTERNAL MEDICINE

## 2021-04-16 PROCEDURE — 25010000002 FENTANYL CITRATE (PF) 100 MCG/2ML SOLUTION: Performed by: INTERNAL MEDICINE

## 2021-04-16 PROCEDURE — 0 IOPAMIDOL PER 1 ML: Performed by: INTERNAL MEDICINE

## 2021-04-16 PROCEDURE — 25010000002 HEPARIN (PORCINE) 2000-0.9 UNIT/L-% SOLUTION: Performed by: INTERNAL MEDICINE

## 2021-04-16 PROCEDURE — 93571 IV DOP VEL&/PRESS C FLO 1ST: CPT | Performed by: INTERNAL MEDICINE

## 2021-04-16 PROCEDURE — C9803 HOPD COVID-19 SPEC COLLECT: HCPCS | Performed by: INTERNAL MEDICINE

## 2021-04-16 PROCEDURE — 85027 COMPLETE CBC AUTOMATED: CPT | Performed by: NURSE PRACTITIONER

## 2021-04-16 PROCEDURE — 25010000002 HEPARIN (PORCINE) 1000-0.9 UT/500ML-% SOLUTION: Performed by: INTERNAL MEDICINE

## 2021-04-16 RX ORDER — HEPARIN SODIUM 200 [USP'U]/100ML
INJECTION, SOLUTION INTRAVENOUS AS NEEDED
Status: DISCONTINUED | OUTPATIENT
Start: 2021-04-16 | End: 2021-04-16 | Stop reason: HOSPADM

## 2021-04-16 RX ORDER — SODIUM CHLORIDE 9 MG/ML
1-3 INJECTION, SOLUTION INTRAVENOUS CONTINUOUS
Status: DISCONTINUED | OUTPATIENT
Start: 2021-04-16 | End: 2021-04-16 | Stop reason: HOSPADM

## 2021-04-16 RX ORDER — DIPHENHYDRAMINE HCL 50 MG
50 CAPSULE ORAL ONCE
Status: DISCONTINUED | OUTPATIENT
Start: 2021-04-16 | End: 2021-04-16 | Stop reason: HOSPADM

## 2021-04-16 RX ORDER — DIPHENHYDRAMINE HYDROCHLORIDE 50 MG/ML
INJECTION INTRAMUSCULAR; INTRAVENOUS AS NEEDED
Status: DISCONTINUED | OUTPATIENT
Start: 2021-04-16 | End: 2021-04-16 | Stop reason: HOSPADM

## 2021-04-16 RX ORDER — MIDAZOLAM HYDROCHLORIDE 1 MG/ML
INJECTION, SOLUTION INTRAMUSCULAR; INTRAVENOUS AS NEEDED
Status: DISCONTINUED | OUTPATIENT
Start: 2021-04-16 | End: 2021-04-16 | Stop reason: HOSPADM

## 2021-04-16 RX ORDER — FENTANYL CITRATE 50 UG/ML
INJECTION, SOLUTION INTRAMUSCULAR; INTRAVENOUS AS NEEDED
Status: DISCONTINUED | OUTPATIENT
Start: 2021-04-16 | End: 2021-04-16 | Stop reason: HOSPADM

## 2021-04-16 RX ORDER — ACETAMINOPHEN 325 MG/1
650 TABLET ORAL EVERY 4 HOURS PRN
Status: DISCONTINUED | OUTPATIENT
Start: 2021-04-16 | End: 2021-04-16 | Stop reason: HOSPADM

## 2021-04-16 RX ORDER — SODIUM CHLORIDE 0.9 % (FLUSH) 0.9 %
10 SYRINGE (ML) INJECTION AS NEEDED
Status: DISCONTINUED | OUTPATIENT
Start: 2021-04-16 | End: 2021-04-16 | Stop reason: HOSPADM

## 2021-04-16 RX ORDER — SODIUM CHLORIDE 9 MG/ML
80 INJECTION, SOLUTION INTRAVENOUS CONTINUOUS
Status: DISCONTINUED | OUTPATIENT
Start: 2021-04-16 | End: 2021-04-16 | Stop reason: HOSPADM

## 2021-04-16 RX ORDER — SODIUM CHLORIDE 0.9 % (FLUSH) 0.9 %
3 SYRINGE (ML) INJECTION EVERY 12 HOURS SCHEDULED
Status: DISCONTINUED | OUTPATIENT
Start: 2021-04-16 | End: 2021-04-16 | Stop reason: HOSPADM

## 2021-04-16 RX ORDER — ONDANSETRON 2 MG/ML
4 INJECTION INTRAMUSCULAR; INTRAVENOUS EVERY 6 HOURS PRN
Status: DISCONTINUED | OUTPATIENT
Start: 2021-04-16 | End: 2021-04-16 | Stop reason: HOSPADM

## 2021-04-16 NOTE — INTERVAL H&P NOTE
H&P reviewed. The patient was examined and there are no changes to the H&P.    Recommend cardiac catheterization, selective coronary angiography, left ventriculography and percutaneous coronary intervention with application of arteriotomy hemostatic closure device.    I discussed cardiac catheterization, the procedure, risks (including bleeding, infection, vascular damage [including minor oozing, bruising, bleeding, and up to and including but not limited to the need for vascular surgery, emergency cardiothoracic surgery, contrast reaction, renal failure, respiratory failure, heart attack, stroke, arrhythmia and even death), benefits, and alternatives and the patient has voiced understanding and is willing to proceed.    Adequate pre-hydration and post cardiac catheterization hydration.  Premedications as required and indicated for cardiac catheterization.    No contraindication to drug eluting stent placement if required  Further recommendations pending results of cardiac catheterization

## 2021-04-28 ENCOUNTER — TRANSCRIBE ORDERS (OUTPATIENT)
Dept: ADMINISTRATIVE | Facility: HOSPITAL | Age: 61
End: 2021-04-28

## 2021-04-28 DIAGNOSIS — Z01.818 PREOP TESTING: Primary | ICD-10-CM

## 2021-04-30 ENCOUNTER — LAB (OUTPATIENT)
Dept: LAB | Facility: HOSPITAL | Age: 61
End: 2021-04-30

## 2021-04-30 LAB — SARS-COV-2 ORF1AB RESP QL NAA+PROBE: NOT DETECTED

## 2021-04-30 PROCEDURE — U0004 COV-19 TEST NON-CDC HGH THRU: HCPCS | Performed by: PAIN MEDICINE

## 2021-04-30 PROCEDURE — C9803 HOPD COVID-19 SPEC COLLECT: HCPCS | Performed by: PAIN MEDICINE

## 2021-05-18 ENCOUNTER — OFFICE VISIT (OUTPATIENT)
Dept: CARDIOLOGY | Facility: CLINIC | Age: 61
End: 2021-05-18

## 2021-05-18 VITALS
WEIGHT: 182 LBS | SYSTOLIC BLOOD PRESSURE: 136 MMHG | BODY MASS INDEX: 32.25 KG/M2 | DIASTOLIC BLOOD PRESSURE: 83 MMHG | HEIGHT: 63 IN

## 2021-05-18 DIAGNOSIS — I10 ESSENTIAL HYPERTENSION: Chronic | ICD-10-CM

## 2021-05-18 DIAGNOSIS — M32.9 LUPUS (HCC): ICD-10-CM

## 2021-05-18 DIAGNOSIS — I77.89 ENLARGEMENT OF ABDOMINAL AORTA (HCC): ICD-10-CM

## 2021-05-18 DIAGNOSIS — E66.01 CLASS 2 SEVERE OBESITY DUE TO EXCESS CALORIES WITH SERIOUS COMORBIDITY AND BODY MASS INDEX (BMI) OF 35.0 TO 35.9 IN ADULT (HCC): ICD-10-CM

## 2021-05-18 DIAGNOSIS — E78.2 MIXED HYPERLIPIDEMIA: Chronic | ICD-10-CM

## 2021-05-18 DIAGNOSIS — R00.2 PALPITATIONS: ICD-10-CM

## 2021-05-18 DIAGNOSIS — Z72.0 TOBACCO ABUSE: ICD-10-CM

## 2021-05-18 DIAGNOSIS — K21.9 GASTROESOPHAGEAL REFLUX DISEASE WITHOUT ESOPHAGITIS: ICD-10-CM

## 2021-05-18 DIAGNOSIS — I51.7 LVH (LEFT VENTRICULAR HYPERTROPHY): ICD-10-CM

## 2021-05-18 DIAGNOSIS — I25.10 NONOBSTRUCTIVE ATHEROSCLEROSIS OF CORONARY ARTERY: Primary | ICD-10-CM

## 2021-05-18 PROBLEM — E78.5 HYPERLIPIDEMIA: Chronic | Status: ACTIVE | Noted: 2017-10-25

## 2021-05-18 PROCEDURE — 99441 PR PHYS/QHP TELEPHONE EVALUATION 5-10 MIN: CPT | Performed by: NURSE PRACTITIONER

## 2021-05-18 RX ORDER — ERGOCALCIFEROL 1.25 MG/1
50000 CAPSULE ORAL
Qty: 5 CAPSULE | Refills: 11 | Status: SHIPPED | OUTPATIENT
Start: 2021-05-18

## 2021-05-18 NOTE — PROGRESS NOTES
Subjective:     Encounter Date: 05/18/2021      Patient ID: Akua Aguilar is a 60 y.o. female   HPI: Her telephone visit today is for routine follow up of outpatient testing.  Left heart catheterization on 4/16/2021 revealed 30% stenosis of the left main coronary artery, 50% stenosis of ostial left anterior descending coronary artery with normal RFR and moderate ectasia without any aneurysmal changes or stenosis of the proximal left circumflex coronary artery and distal left circumflex coronary artery.  No PCI was performed. Patient has a history of nonobstructive coronary artery disease, hypertension, hyperlipidemia, left ventricular hypertrophy, enlargement of abdominal aorta, GERD, and smoking. US of aorta 8/14/2020 at [facility] revealed no evidence of AAA.  She continues to complain of abdominal distention. She reports chronic orthopnea. She complains of a two week history of palpitations that occur daily. Patient denies chest pain, dizziness, syncope, PND and decreased stamina. Patient denies signs of bleeding.    Chief Complaint: routine follow up  Hypertension  This is a chronic problem. The current episode started more than 1 year ago. The problem is uncontrolled. Associated symptoms include malaise/fatigue, orthopnea and palpitations. Pertinent negatives include no anxiety, blurred vision, chest pain, headaches, neck pain, peripheral edema, PND, shortness of breath or sweats. There are no associated agents to hypertension. Risk factors for coronary artery disease include dyslipidemia, post-menopausal state and obesity. Current antihypertension treatment includes alpha 1 blockers, diuretics, calcium channel blockers, beta blockers and ACE inhibitors. There is no history of angina, kidney disease, CAD/MI, CVA, heart failure, left ventricular hypertrophy, PVD or retinopathy. There is no history of chronic renal disease, coarctation of the aorta, hyperaldosteronism, hypercortisolism,  hyperparathyroidism, a hypertension causing med, pheochromocytoma, renovascular disease, sleep apnea or a thyroid problem.   Hyperlipidemia  This is a chronic problem. The current episode started more than 1 year ago. The problem is uncontrolled. Recent lipid tests were reviewed and are high. Exacerbating diseases include obesity. She has no history of chronic renal disease, diabetes, hypothyroidism, liver disease or nephrotic syndrome. Pertinent negatives include no chest pain, focal sensory loss, focal weakness, leg pain, myalgias or shortness of breath. She is currently on no antihyperlipidemic treatment. Risk factors for coronary artery disease include dyslipidemia, hypertension and post-menopausal.       Previous Cardiac Testing:  Results for orders placed during the hospital encounter of 02/25/21    Adult Transthoracic Echo Complete w/ Color, Spectral and Contrast if necessary per protocol    Interpretation Summary  · Left ventricular wall thickness is consistent with mild concentric hypertrophy.  · Left ventricular ejection fraction appears to be 56 - 60%. Left ventricular systolic function is normal.  · Abnormal global longitudinal LV strain (GLS) = -12%  · Left ventricular diastolic function was indeterminate.  · Mild pulmonary hypertension is present.    Results for orders placed during the hospital encounter of 06/19/20   Cardiac Catheterization/Vascular Study    Narrative Cardiac Catheterization Operative Report    Patient was referred for cardiac catheterization .   Indications for the procedure include: Symptoms suggestive of angina with   high suspicion for significant obstructive coronary artery disease   With ongoing chest pain    Procedure performed    Coronary angiography  Left heart catheterization  Left ventriculography  Fractional flow reserve of the left anterior descending coronary artery  Supervision of the administration of moderate sedation    Fractional flow reserve of left anterior  descending coronary artery using   Ikari 3.56 Thai guide  Usual protocol was followed.  Adenosine was used to the or hyperemia.    Guide used as mentioned above.  The guide was advanced and the ostium of the vessel was engaged.  We   zeroed the status post aortic pressure then we advanced a fractional flow   reserve wire.  This was equalized prior to crossing the lesion.  Then we   crossed the lesion.  Anticoagulation was used prior to insertion of the   wire.  After crossing the lesion adenosine was used.  This caused   hyperemia.  Fractional flow reserve was then performed and documented as   noted.  End of case the wire was removed removed angiography was performed   before and after removing the wire to document no complication and decide   results were achieved.       Procedure Details  The risks, benefits, complications, treatment options, and expected   outcomes were discussed with the patient. Plan is for moderate sedation,   and the patient agrees to proceed with the procedure.  An immediate   assessment was done prior to the administration of moderate sedation.     The patient and/or family concurred with the proposed plan, giving   informed consent. Patient was brought to the cath lab after IV hydration   was begun and oral premedication was given. The was further sedated with   Fentanyl  and Versed.    The patient was prepped and draped in the usual manner. Using the modified   Seldinger access technique, a 6f Thai sheath was placed in the right   radial  artery.   A left heart catheterization was done. Angiograms were also done.        Cardiac Catheterization Operative Report      Patient was referred for cardiac catheterization: High suspicion for   coronary artery disease    Procedure Details  The risks, benefits, complications, treatment options, and expected   outcomes were discussed with the patient. Plan is for moderate sedation,   and the patient agrees to proceed with the procedure.  An  immediate   assessment was done prior to the administration of moderate sedation.    The patient and/or family concurred with the proposed plan, giving   informed consent. Patient was brought to the cath lab after IV hydration   was begun and oral premedication was given.     The skin overlying the patient's right radial artery was prepped and   draped in the usual sterile fashion.  Timeout was taken to confirm the   correct patient and procedure.  Lidocaine was administered for local   anesthesia.  IV Versed and fentanyl were used to achieve conscious   sedation.  Modified Seldinger technique was then used to place a 6 Bengali   sheath in the right radial artery    Diagnostic coronary angiography was performed with Tig coronary catheter.    Coronary angiogram were performed in VANCE and JIMÉNEZ projection to evaluate   the coronary arterial systems.      A TR band device was used to achieve hemostasis at the end of the   procedure.  The patient tolerated the procedure well, and there were no   immediate complications.    Procedural Details: After written and informed consent was obtained, the   patient was brought to the cath lab in a fasting state.       1. Selective coronary angiography:    Left main coronary artery:  The left main coronary artery arises from the   left coronary cusp and bifurcates into the  left anterior descending   coronary artery  and left circumflex arteries.      Left anterior descending artery:  The  left anterior descending coronary   artery   arises normally from the left main coronary artery and courses in   the anterior interventricular groove and terminates at the apex.  50%   stenosis noted of the proximal left anterior descending coronary artery   with large circumferential soft atherosclerotic plaque  Normal fractional flow reserve of 0.9 or higher    Left circumflex:  The left circumflex arises form the left man artery and   supplies obtuse marginal branches.  Minor atherosclerotic  plaque noted in   the mid left circumflex coronary artery which is dominant and large    Right coronary artery:  The right coronary artery  arises normally from   the right coronary cusp and is dominant for the posterior circulation.    The right coronary artery  is non-dominant and normal.    2. Left heart cath: Moderately elevated left ventricular end-diastolic   pressure of 24 mmHg.    3. LV Gram: Normal left ventricular ejection fraction of 55% with no   regional wall motion abnormalities.    4. Interventions: None    Estimated Blood Loss:  Minimal    Specimens: None         Complications:  None; patient tolerated the procedure well.           Disposition: Cardiovascular observation unit           Condition: stable            I supervised the administration of conscious sedation by nursing staff   throughout the case.  First dose was given at 1119 and the end of my   face-to-face encounter was at 1145 Hours.    During the case, continuous pulse oximetry, heart rate, blood pressure,   and patient status were monitored.       Risk, Benefits, and Alternatives discussed with the patient and/or family.    Plan is for moderate sedation, and the patient agrees to proceed with the   procedure.  An immediate assessment was done prior to the administration   of moderate sedation      Conclusion    50% stenosis of proximal left anterior descending coronary artery with a   normal fractional flow reserve 0.9 or higher with a large circumferential   plaque burden for which aggressive medical therapy is advised  Large dominant left circumflex coronary artery with minor atherosclerotic   plaque in the midsegment without any significant lesions  Right coronary artery is nondominant small and normal  Moderately elevated left ventricular end-diastolic pressure of 24 mmHg  Normal left ventricular ejection fraction of 55% without any discrete wall   motion abnormalities and no significant mitral regurgitation        Plan    Keep LDL  well below 70 mg/dL  Treat for elevated left ventricular end-diastolic pressure  Can be discharged home today after period of observation  Hydration   Observation  Risk factor modifications           Abnormal coronary CT angiography with 25 to 49% stenosis of the left   anterior descending coronary artery         The following portions of the patient's history were reviewed and updated as appropriate: allergies, current medications, past family history, past medical history, past social history, past surgical history and problem list.    Allergies   Allergen Reactions   • Aspirin GI Intolerance     Nausea and vomitting   • Latex Rash     Blisters with tape and intolerance with gloves       Current Outpatient Medications:   •  albuterol sulfate  (90 Base) MCG/ACT inhaler, Inhale 2 puffs Every 6 (Six) Hours As Needed., Disp: , Rfl:   •  amLODIPine (NORVASC) 10 MG tablet, Take 1 tablet by mouth Daily., Disp: 90 tablet, Rfl: 4  •  ARIPiprazole (ABILIFY) 10 MG tablet, Take 10 mg by mouth every night at bedtime., Disp: , Rfl:   •  atorvastatin (LIPITOR) 10 MG tablet, Take 1 tablet by mouth Daily., Disp: 30 tablet, Rfl: 11  •  Blood Pressure Monitoring (SPHYGMOMANOMETER) misc, 1 Units 2 (Two) Times a Day., Disp: 1 each, Rfl: 0  •  carvedilol (COREG) 25 MG tablet, Take 1 tablet by mouth 2 (Two) Times a Day., Disp: 180 tablet, Rfl: 3  •  clonazePAM (KlonoPIN) 0.5 MG tablet, Take 0.5 mg by mouth 2 (Two) Times a Day., Disp: , Rfl:   •  cloNIDine (CATAPRES) 0.1 MG tablet, Take 1 tablet by mouth 2 (Two) Times a Day As Needed for High Blood Pressure., Disp: 180 tablet, Rfl: 3  •  cloNIDine (CATAPRES-TTS) 0.3 MG/24HR patch, Place 1 patch on the skin as directed by provider 1 (One) Time Per Week., Disp: 12 patch, Rfl: 3  •  doxepin (SINEquan) 25 MG capsule, 1 CAPSULE BY MOUTH AT BEDTIME FILL ON OR AFTER 1/4/2018, Disp: , Rfl: 1  •  gabapentin (NEURONTIN) 300 MG capsule, Take 300 mg by mouth 2 (two) times a day., Disp: , Rfl:    •  gabapentin (NEURONTIN) 600 MG tablet, Take 600 mg by mouth Daily., Disp: , Rfl:   •  hydrALAZINE (APRESOLINE) 25 MG tablet, Take 1 tablet by mouth 3 (Three) Times a Day., Disp: 270 tablet, Rfl: 3  •  hydroCHLOROthiazide (HYDRODIURIL) 50 MG tablet, Take 1 tablet by mouth Daily., Disp: 90 tablet, Rfl: 3  •  HYDROcodone-acetaminophen (NORCO)  MG per tablet, Take 1 tablet by mouth 3 (Three) Times a Day., Disp: , Rfl:   •  hydrOXYzine pamoate (VISTARIL) 25 MG capsule, Take 25 mg by mouth Every 8 (Eight) Hours As Needed., Disp: , Rfl:   •  levETIRAcetam (KEPPRA) 500 MG tablet, Take 500 mg by mouth 2 (Two) Times a Day., Disp: , Rfl:   •  lisinopril (PRINIVIL,ZESTRIL) 40 MG tablet, TAKE 1 TABLET BY MOUTH TWICE A DAY, Disp: 60 tablet, Rfl: 7  •  nitroglycerin (NITROSTAT) 0.4 MG SL tablet, Take no more than 3 doses in 15 minutes., Disp: 30 tablet, Rfl: 3  •  Potassium 99 MG tablet, Take  by mouth., Disp: , Rfl:   •  vitamin D (ERGOCALCIFEROL) 1.25 MG (29541 UT) capsule capsule, Take 1 capsule by mouth Every 7 (Seven) Days., Disp: 5 capsule, Rfl: 11  Past Medical History:   Diagnosis Date   • Arthritis    • Cancer (CMS/HCC)     CREVICAL, UTERINE   • COPD (chronic obstructive pulmonary disease) (CMS/HCC)    • Fatty liver    • GERD (gastroesophageal reflux disease)    • History of transfusion    • Hypertension    • Kidney stone    • Low back pain potentially associated with spinal stenosis    • Lupus (CMS/HCC)    • Migraines    • Scoliosis    • Seizure (CMS/HCC) 02/01/2021   • Shortness of breath    • UTI (urinary tract infection)      Past Surgical History:   Procedure Laterality Date   • BACK SURGERY      lumbar   • CARDIAC CATHETERIZATION     • CARDIAC CATHETERIZATION N/A 6/19/2020    Procedure: Cardiac Catheterization/Vascular Study Radial cath;  Surgeon: Robert Padilla MD;  Location:  [facility] CATH INVASIVE LOCATION;  Service: Cardiology;  Laterality: N/A;   • CARDIAC CATHETERIZATION N/A 4/16/2021    Procedure: Left  Heart Cath;  Surgeon: Robert Padilla MD;  Location: [facility] CATH INVASIVE LOCATION;  Service: Cardiology;  Laterality: N/A;   • CARDIAC DEFIBRILLATOR PLACEMENT     •  SECTION     • CHOLECYSTECTOMY     • COLON RESECTION       or    • COLONOSCOPY     • COLONOSCOPY N/A 5/10/2018    Procedure: COLONOSCOPY WITH ANESTHESIA;  Surgeon: Shawna Valladares MD;  Location: [facility] ENDOSCOPY;  Service: Gastroenterology   • ENDOSCOPY N/A 5/10/2018    Procedure: ESOPHAGOGASTRODUODENOSCOPY WITH ANESTHESIA;  Surgeon: Shawna Valladares MD;  Location: [facility] ENDOSCOPY;  Service: Gastroenterology   • EYE SURGERY      x 2   • HYSTERECTOMY     • LEG SURGERY Right     bars/bolts placed   • UPPER GASTROINTESTINAL ENDOSCOPY     • URETEROSCOPY LASER LITHOTRIPSY WITH STENT INSERTION Right 2018    Procedure: URETEROSCOPY  WITH STENT INSERTION RETROGRADE PYELOGRAM;  Surgeon: Tyrell Hardin MD;  Location: [facility] OR;  Service: Urology     Family History   Problem Relation Age of Onset   • Cancer Mother    • Heart disease Mother    • Heart disease Father    • Lupus Sister    • Heart disease Sister    • Heart disease Sister    • Lupus Sister    • Hypokalemia Sister    • Alcohol abuse Sister    • Colon cancer Neg Hx    • Colon polyps Neg Hx      Social History     Socioeconomic History   • Marital status:      Spouse name: Not on file   • Number of children: Not on file   • Years of education: Not on file   • Highest education level: Not on file   Tobacco Use   • Smoking status: Current Every Day Smoker     Packs/day: 1.00     Years: 40.00     Pack years: 40.00     Types: Cigarettes   • Smokeless tobacco: Never Used   • Tobacco comment: started smoking more since son    Vaping Use   • Vaping Use: Never used   Substance and Sexual Activity   • Alcohol use: Not Currently     Comment: quit    • Drug use: Never   • Sexual activity: Defer       Review of Systems   Constitutional: Positive for malaise/fatigue and  "weight gain. Negative for chills, decreased appetite, diaphoresis, fever, night sweats and weight loss.   HENT: Negative for nosebleeds.    Eyes: Negative for blurred vision and visual disturbance.   Cardiovascular: Positive for orthopnea and palpitations. Negative for chest pain, claudication, cyanosis, dyspnea on exertion, irregular heartbeat, leg swelling, near-syncope, paroxysmal nocturnal dyspnea and syncope.   Respiratory: Negative for cough, hemoptysis, shortness of breath, snoring and wheezing.    Endocrine: Negative for cold intolerance and heat intolerance.   Hematologic/Lymphatic: Negative for bleeding problem. Does not bruise/bleed easily.   Skin: Negative for dry skin and rash.   Musculoskeletal: Negative for back pain, falls, myalgias, neck pain and stiffness.   Gastrointestinal: Positive for bloating. Negative for abdominal pain, change in bowel habit, constipation, diarrhea, dysphagia, heartburn, nausea and vomiting.   Genitourinary: Negative for hematuria.   Neurological: Negative for dizziness, focal weakness, headaches, light-headedness, numbness and weakness.   Psychiatric/Behavioral: Negative for altered mental status. The patient does not have insomnia.    Allergic/Immunologic: Negative for persistent infections.              Objective:     Physical Exam      Procedures  /83   Ht 160 cm (63\")   Wt 82.6 kg (182 lb)   BMI 32.24 kg/m²   Lab Review:   Lab Results   Component Value Date    WBC 7.39 04/16/2021    HGB 15.0 04/16/2021    HCT 45.4 04/16/2021    MCV 89.0 04/16/2021     04/16/2021     Lab Results   Component Value Date    GLUCOSE 89 04/16/2021    BUN 13 04/16/2021    CREATININE 0.46 (L) 04/16/2021    EGFRIFNONA 139 04/16/2021    BCR 28.3 (H) 04/16/2021    K 3.7 04/16/2021    CO2 32.0 (H) 04/16/2021    CALCIUM 9.4 04/16/2021    ALBUMIN 4.20 02/24/2021    AST 13 02/24/2021    ALT 13 02/24/2021     Lab Results   Component Value Date    CHOL 165 02/24/2021    CHOL 186 " 10/26/2017    CHLPL 232 (H) 04/19/2016     Lab Results   Component Value Date    TRIG 227 (H) 02/24/2021    TRIG 185 (H) 10/26/2017    TRIG 267 (H) 04/19/2016     Lab Results   Component Value Date    HDL 60 02/24/2021    HDL 55 10/26/2017    HDL 57 04/19/2016     Lab Results   Component Value Date    LDL 68 02/24/2021     (H) 10/26/2017     (H) 04/19/2016       I have reviewed the most recent lab results.       Assessment:          Diagnosis Plan   1. Nonobstructive atherosclerosis of coronary artery  Pt to be scheduled for left heart catheterization.    2. Essential hypertension  Blood pressures have been well controlled.     3. Mixed hyperlipidemia   cholesterol well controlled.  LDL 68 on lipid panel 2/24/2021.  Continue Lipitor.   4. Enlargement of abdominal aorta (CMS/[facility]) small infra renal  US aorta 8/14/2020 - no evidence of AAA   5. LVH (left ventricular hypertrophy) mild   mild on echo 2/25/2021.   6. Gastroesophageal reflux disease without esophagitis  Managed by PCP.   7. Lupus (CMS/[facility]) Follows with rheumatology.  Normal sed rate and CRP 2/24/2021.   8. Tobacco abuse  Akua Aguilar  reports that she has been smoking cigarettes. She has a 40.00 pack-year smoking history. She has never used smokeless tobacco.. I have educated her on the risk of diseases from using tobacco products such as cancer, COPD and heart diease.     I advised her to quit and she is not willing to quit.    I spent 3  minutes counseling the patient.          9. Class 2 severe obesity due to excess calories with serious comorbidity and body mass index (BMI) of 35.0 to 35.9 in adult (CMS/[facility]) Patient's Body mass index is 32.24 kg/m². BMI is above normal parameters. Recommendations include: educational material.     10.  Palpitations 7 day Epatch.           Plan:       1. Continue medications as previously prescribed.  2. Report any worsening symptoms.  3. Report any signs of bleeding.  4. Continue heart healthy  diet and regular exercise as tolerated.   5. Follow up with PCP for blood pressure and cholesterol management and routine lab work.  6.  Monitor and record daily blood pressure. Report readings consistently higher than 140/90 or consistently lower than 100/60.   7. 7 day Epatch.   8. Follow up in one month, or sooner if needed.     This visit has been rescheduled as a phone visit to comply with patient safety concerns in accordance with CDC recommendations. Total time of discussion was 7 minutes.    You have chosen to receive care through a telephone visit. Do you consent to use a telephone visit for your medical care today? Yes

## 2021-05-18 NOTE — PATIENT INSTRUCTIONS
Steps to Quit Smoking  Smoking tobacco is the leading cause of preventable death. It can affect almost every organ in the body. Smoking puts you and people around you at risk for many serious, long-lasting (chronic) diseases. Quitting smoking can be hard, but it is one of the best things that you can do for your health. It is never too late to quit.  How do I get ready to quit?  When you decide to quit smoking, make a plan to help you succeed. Before you quit:  · Pick a date to quit. Set a date within the next 2 weeks to give you time to prepare.  · Write down the reasons why you are quitting. Keep this list in places where you will see it often.  · Tell your family, friends, and co-workers that you are quitting. Their support is important.  · Talk with your doctor about the choices that may help you quit.  · Find out if your health insurance will pay for these treatments.  · Know the people, places, things, and activities that make you want to smoke (triggers). Avoid them.  What first steps can I take to quit smoking?  · Throw away all cigarettes at home, at work, and in your car.  · Throw away the things that you use when you smoke, such as ashtrays and lighters.  · Clean your car. Make sure to empty the ashtray.  · Clean your home, including curtains and carpets.  What can I do to help me quit smoking?  Talk with your doctor about taking medicines and seeing a counselor at the same time. You are more likely to succeed when you do both.  · If you are pregnant or breastfeeding, talk with your doctor about counseling or other ways to quit smoking. Do not take medicine to help you quit smoking unless your doctor tells you to do so.  To quit smoking:  Quit right away  · Quit smoking totally, instead of slowly cutting back on how much you smoke over a period of time.  · Go to counseling. You are more likely to quit if you go to counseling sessions regularly.  Take medicine  You may take medicines to help you quit. Some  medicines need a prescription, and some you can buy over-the-counter. Some medicines may contain a drug called nicotine to replace the nicotine in cigarettes. Medicines may:  · Help you to stop having the desire to smoke (cravings).  · Help to stop the problems that come when you stop smoking (withdrawal symptoms).  Your doctor may ask you to use:  · Nicotine patches, gum, or lozenges.  · Nicotine inhalers or sprays.  · Non-nicotine medicine that is taken by mouth.  Find resources  Find resources and other ways to help you quit smoking and remain smoke-free after you quit. These resources are most helpful when you use them often. They include:  · Online chats with a counselor.  · Phone quitlines.  · Printed self-help materials.  · Support groups or group counseling.  · Text messaging programs.  · Mobile phone apps. Use apps on your mobile phone or tablet that can help you stick to your quit plan. There are many free apps for mobile phones and tablets as well as websites. Examples include Quit Guide from the CDC and smokefree.gov    What things can I do to make it easier to quit?    · Talk to your family and friends. Ask them to support and encourage you.  · Call a phone quitline (8-743-QUITNOW), reach out to support groups, or work with a counselor.  · Ask people who smoke to not smoke around you.  · Avoid places that make you want to smoke, such as:  ? Bars.  ? Parties.  ? Smoke-break areas at work.  · Spend time with people who do not smoke.  · Lower the stress in your life. Stress can make you want to smoke. Try these things to help your stress:  ? Getting regular exercise.  ? Doing deep-breathing exercises.  ? Doing yoga.  ? Meditating.  ? Doing a body scan. To do this, close your eyes, focus on one area of your body at a time from head to toe. Notice which parts of your body are tense. Try to relax the muscles in those areas.  How will I feel when I quit smoking?  Day 1 to 3 weeks  Within the first 24 hours,  you may start to have some problems that come from quitting tobacco. These problems are very bad 2-3 days after you quit, but they do not often last for more than 2-3 weeks. You may get these symptoms:  · Mood swings.  · Feeling restless, nervous, angry, or annoyed.  · Trouble concentrating.  · Dizziness.  · Strong desire for high-sugar foods and nicotine.  · Weight gain.  · Trouble pooping (constipation).  · Feeling like you may vomit (nausea).  · Coughing or a sore throat.  · Changes in how the medicines that you take for other issues work in your body.  · Depression.  · Trouble sleeping (insomnia).  Week 3 and afterward  After the first 2-3 weeks of quitting, you may start to notice more positive results, such as:  · Better sense of smell and taste.  · Less coughing and sore throat.  · Slower heart rate.  · Lower blood pressure.  · Clearer skin.  · Better breathing.  · Fewer sick days.  Quitting smoking can be hard. Do not give up if you fail the first time. Some people need to try a few times before they succeed. Do your best to stick to your quit plan, and talk with your doctor if you have any questions or concerns.  Summary  · Smoking tobacco is the leading cause of preventable death. Quitting smoking can be hard, but it is one of the best things that you can do for your health.  · When you decide to quit smoking, make a plan to help you succeed.  · Quit smoking right away, not slowly over a period of time.  · When you start quitting, seek help from your doctor, family, or friends.  This information is not intended to replace advice given to you by your health care provider. Make sure you discuss any questions you have with your health care provider.  Document Revised: 09/11/2020 Document Reviewed: 03/07/2020  [employer] Patient Education © 2021 [employer]  Health Risks of Smoking  Smoking cigarettes is very bad for your health. Tobacco smoke has over 200 known poisons in it. It contains the poisonous gases  nitrogen oxide and carbon monoxide. There are over 60 chemicals in tobacco smoke that cause cancer.  Smoking is difficult to quit because a chemical in tobacco, called nicotine, causes addiction or dependence. When you smoke and inhale, nicotine is absorbed rapidly into the bloodstream through your lungs. Both inhaled and non-inhaled nicotine may be addictive.  What are the risks of cigarette smoke?  Cigarette smokers have an increased risk of many serious medical problems, including:  · Lung cancer.  · Lung disease, such as pneumonia, bronchitis, and emphysema.  · Chest pain (angina) and heart attack because the heart is not getting enough oxygen.  · Heart disease and peripheral blood vessel disease.  · High blood pressure (hypertension).  · Stroke.  · Oral cancer, including cancer of the lip, mouth, or voice box.  · Bladder cancer.  · Pancreatic cancer.  · Cervical cancer.  · Pregnancy complications, including premature birth.  · Stillbirths and smaller  babies, birth defects, and genetic damage to sperm.  · Early menopause.  · Lower estrogen level for women.  · Infertility.  · Facial wrinkles.  · Blindness.  · Increased risk of broken bones (fractures).  · Senile dementia.  · Stomach ulcers and internal bleeding.  · Delayed wound healing and increased risk of complications during surgery.  · Even smoking lightly shortens your life expectancy by several years.  Because of secondhand smoke exposure, children of smokers have an increased risk of the following:  · Sudden infant death syndrome (SIDS).  · Respiratory infections.  · Lung cancer.  · Heart disease.  · Ear infections.  What are the benefits of quitting?  There are many health benefits of quitting smoking. Here are some of them:  · Within days of quitting smoking, your risk of having a heart attack decreases, your blood flow improves, and your lung capacity improves. Blood pressure, pulse rate, and breathing patterns start returning to normal soon  after quitting.  · Within months, your lungs may clear up completely.  · Quitting for 10 years reduces your risk of developing lung cancer and heart disease to almost that of a nonsmoker.  · People who quit may see an improvement in their overall quality of life.  How do I quit smoking?         Smoking is an addiction with both physical and psychological effects, and longtime habits can be hard to change. Your health care provider can recommend:  · Programs and community resources, which may include group support, education, or talk therapy.  · Prescription medicines to help reduce cravings.  · Nicotine replacement products, such as patches, gum, and nasal sprays. Use these products only as directed. Do not replace cigarette smoking with electronic cigarettes, which are commonly called e-cigarettes. The safety of e-cigarettes is not known, and some may contain harmful chemicals.  · A combination of two or more of these methods.  Where to find more information  · American Lung Association: www.lung.org  · American Cancer Society: www.cancer.org  Summary  · Smoking cigarettes is very bad for your health. Cigarette smokers have an increased risk of many serious medical problems, including several cancers, heart disease, and stroke.  · Smoking is an addiction with both physical and psychological effects, and longtime habits can be hard to change.  · By stopping right away, you can greatly reduce the risk of medical problems for you and your family.  · To help you quit smoking, your health care provider can recommend programs, community resources, prescription medicines, and nicotine replacement products such as patches, gum, and nasal sprays.  This information is not intended to replace advice given to you by your health care provider. Make sure you discuss any questions you have with your health care provider.  Document Revised: 03/21/2019 Document Reviewed: 12/22/2017  [employer] Patient Education © 2021 [employer]  Obesity, Adult  Obesity is having too much body fat. Being obese means that your weight is more than what is healthy for you.  BMI is a number that explains how much body fat you have. If you have a BMI of 30 or more, you are obese. Obesity is often caused by eating or drinking more calories than your body uses. Changing your lifestyle can help you lose weight.  Obesity can cause serious health problems, such as:  · Stroke.  · Coronary artery disease (CAD).  · Type 2 diabetes.  · Some types of cancer, including cancers of the colon, breast, uterus, and gallbladder.  · Osteoarthritis.  · High blood pressure (hypertension).  · High cholesterol.  · Sleep apnea.  · Gallbladder stones.  · Infertility problems.  What are the causes?  · Eating meals each day that are high in calories, sugar, and fat.  · Being born with genes that may make you more likely to become obese.  · Having a medical condition that causes obesity.  · Taking certain medicines.  · Sitting a lot (having a sedentary lifestyle).  · Not getting enough sleep.  · Drinking a lot of drinks that have sugar in them.  What increases the risk?  · Having a family history of obesity.  · Being an  woman.  · Being a  man.  · Living in an area with limited access to:  ? Pepper, recreation centers, or sidewalks.  ? Healthy food choices, such as grocery stores and [employer] markets.  What are the signs or symptoms?  The main sign is having too much body fat.  How is this treated?  · Treatment for this condition often includes changing your lifestyle. Treatment may include:  ? Changing your diet. This may include making a healthy meal plan.  ? Exercise. This may include activity that causes your heart to beat faster (aerobic exercise) and strength training. Work with your doctor to design a program that works for you.  ? Medicine to help you lose weight. This may be used if you are not able to lose 1 pound a week after 6 weeks of healthy  eating and more exercise.  ? Treating conditions that cause the obesity.  ? Surgery. Options may include gastric banding and gastric bypass. This may be done if:  § Other treatments have not helped to improve your condition.  § You have a BMI of 40 or higher.  § You have life-threatening health problems related to obesity.  Follow these instructions at home:  Eating and drinking    · Follow advice from your doctor about what to eat and drink. Your doctor may tell you to:  ? Limit fast food, sweets, and processed snack foods.  ? Choose low-fat options. For example, choose low-fat milk instead of whole milk.  ? Eat 5 or more servings of fruits or vegetables each day.  ? Eat at home more often. This gives you more control over what you eat.  ? Choose healthy foods when you eat out.  ? Learn to read food labels. This will help you learn how much food is in 1 serving.  ? Keep low-fat snacks available.  ? Avoid drinks that have a lot of sugar in them. These include soda, fruit juice, iced tea with sugar, and flavored milk.  · Drink enough water to keep your pee (urine) pale yellow.  · Do not go on fad diets.  Physical activity  · Exercise often, as told by your doctor. Most adults should get up to 150 minutes of moderate-intensity exercise every week.Ask your doctor:  ? What types of exercise are safe for you.  ? How often you should exercise.  · Warm up and stretch before being active.  · Do slow stretching after being active (cool down).  · Rest between times of being active.  Lifestyle  · Work with your doctor and a food expert (dietitian) to set a weight-loss goal that is best for you.  · Limit your screen time.  · Find ways to reward yourself that do not involve food.  · Do not drink alcohol if:  ? Your doctor tells you not to drink.  ? You are pregnant, may be pregnant, or are planning to become pregnant.  · If you drink alcohol:  ? Limit how much you use to:  § 0-1 drink a day for women.  § 0-2 drinks a day for  men.  ? Be aware of how much alcohol is in your drink. In the [place] one drink equals one 12 oz bottle of beer (355 mL), one 5 oz glass of wine (148 mL), or one 1½ oz glass of hard liquor (44 mL).  General instructions  · Keep a weight-loss journal. This can help you keep track of:  ? The food that you eat.  ? How much exercise you get.  · Take over-the-counter and prescription medicines only as told by your doctor.  · Take vitamins and supplements only as told by your doctor.  · Think about joining a support group.  · Keep all follow-up visits as told by your doctor. This is important.  Contact a doctor if:  · You cannot meet your weight loss goal after you have changed your diet and lifestyle for 6 weeks.  Get help right away if you:  · Are having trouble breathing.  · Are having thoughts of harming yourself.  Summary  · Obesity is having too much body fat.  · Being obese means that your weight is more than what is healthy for you.  · Work with your doctor to set a weight-loss goal.  · Get regular exercise as told by your doctor.  This information is not intended to replace advice given to you by your health care provider. Make sure you discuss any questions you have with your health care provider.  Document Revised: 08/22/2019 Document Reviewed: 08/22/2019  [employer] Patient Education © 2021 [employer].

## 2021-05-20 ENCOUNTER — TELEPHONE (OUTPATIENT)
Dept: CARDIOLOGY | Facility: CLINIC | Age: 61
End: 2021-05-20

## 2021-05-20 NOTE — TELEPHONE ENCOUNTER
I called pt to get her set up for the heart monitor.  I left a  msg for her to call me back.  Gus Bass, CMA

## 2021-06-08 ENCOUNTER — TELEPHONE (OUTPATIENT)
Dept: CARDIOLOGY | Facility: CLINIC | Age: 61
End: 2021-06-08

## 2021-06-08 NOTE — TELEPHONE ENCOUNTER
01/26/20 2110   Provider Notification   Reason for Communication Evaluate   Provider Name tello keane   Provider Notification Physician Assistant   Method of Communication Secure Message   Response Other (Comment)  (place on dr. Asiya Briggs list) Patient called and left a voicemail stating she has been out of town due to a family emergency and needs to speak with someone regarding the Holter she received.  She can be reached at [phone].  Thank you!

## 2021-06-09 NOTE — TELEPHONE ENCOUNTER
I called pt back.  She has a defibrillator on the left side and was not sure where to put the patch.  I told her to call [facility] to see if she can get the flex adapter so it will by pass the area.  She can get instructions from them of what to do.  I made her a fu telephone visit for 7/1 with Anel.  Gus Bass, CMA

## 2021-07-15 ENCOUNTER — TRANSCRIBE ORDERS (OUTPATIENT)
Dept: LAB | Facility: HOSPITAL | Age: 61
End: 2021-07-15

## 2021-07-15 DIAGNOSIS — Z01.818 PREOPERATIVE TESTING: Primary | ICD-10-CM

## 2021-07-16 ENCOUNTER — LAB (OUTPATIENT)
Dept: LAB | Facility: HOSPITAL | Age: 61
End: 2021-07-16

## 2021-07-16 LAB — SARS-COV-2 ORF1AB RESP QL NAA+PROBE: NOT DETECTED

## 2021-07-16 PROCEDURE — U0004 COV-19 TEST NON-CDC HGH THRU: HCPCS | Performed by: PAIN MEDICINE

## 2021-07-16 PROCEDURE — C9803 HOPD COVID-19 SPEC COLLECT: HCPCS | Performed by: PAIN MEDICINE

## 2021-07-20 RX ORDER — CLONIDINE 0.3 MG/24H
1 PATCH, EXTENDED RELEASE TRANSDERMAL WEEKLY
Qty: 12 PATCH | Refills: 3 | Status: SHIPPED | OUTPATIENT
Start: 2021-07-20 | End: 2022-08-01

## 2021-07-27 ENCOUNTER — OFFICE VISIT (OUTPATIENT)
Dept: CARDIOLOGY | Facility: CLINIC | Age: 61
End: 2021-07-27

## 2021-07-27 VITALS — SYSTOLIC BLOOD PRESSURE: 123 MMHG | BODY MASS INDEX: 32.25 KG/M2 | HEIGHT: 63 IN | DIASTOLIC BLOOD PRESSURE: 73 MMHG

## 2021-07-27 DIAGNOSIS — E66.01 CLASS 2 SEVERE OBESITY DUE TO EXCESS CALORIES WITH SERIOUS COMORBIDITY AND BODY MASS INDEX (BMI) OF 35.0 TO 35.9 IN ADULT (HCC): ICD-10-CM

## 2021-07-27 DIAGNOSIS — I10 ESSENTIAL HYPERTENSION: Chronic | ICD-10-CM

## 2021-07-27 DIAGNOSIS — E78.2 MIXED HYPERLIPIDEMIA: Chronic | ICD-10-CM

## 2021-07-27 DIAGNOSIS — M32.9 LUPUS (HCC): ICD-10-CM

## 2021-07-27 DIAGNOSIS — I51.7 LVH (LEFT VENTRICULAR HYPERTROPHY): ICD-10-CM

## 2021-07-27 DIAGNOSIS — Z72.0 TOBACCO ABUSE: ICD-10-CM

## 2021-07-27 DIAGNOSIS — I25.10 NONOBSTRUCTIVE ATHEROSCLEROSIS OF CORONARY ARTERY: Primary | ICD-10-CM

## 2021-07-27 DIAGNOSIS — I77.89 ENLARGEMENT OF ABDOMINAL AORTA (HCC): ICD-10-CM

## 2021-07-27 DIAGNOSIS — K21.9 GASTROESOPHAGEAL REFLUX DISEASE WITHOUT ESOPHAGITIS: ICD-10-CM

## 2021-07-27 DIAGNOSIS — R00.2 PALPITATIONS: ICD-10-CM

## 2021-07-27 PROCEDURE — 99214 OFFICE O/P EST MOD 30 MIN: CPT | Performed by: NURSE PRACTITIONER

## 2021-07-27 RX ORDER — NITROGLYCERIN 0.4 MG/1
TABLET SUBLINGUAL
Qty: 25 TABLET | Refills: 1 | Status: SHIPPED | OUTPATIENT
Start: 2021-07-27 | End: 2022-12-23

## 2021-07-27 NOTE — PATIENT INSTRUCTIONS
Steps to Quit Smoking  Smoking tobacco is the leading cause of preventable death. It can affect almost every organ in the body. Smoking puts you and people around you at risk for many serious, long-lasting (chronic) diseases. Quitting smoking can be hard, but it is one of the best things that you can do for your health. It is never too late to quit.  How do I get ready to quit?  When you decide to quit smoking, make a plan to help you succeed. Before you quit:  · Pick a date to quit. Set a date within the next 2 weeks to give you time to prepare.  · Write down the reasons why you are quitting. Keep this list in places where you will see it often.  · Tell your family, friends, and co-workers that you are quitting. Their support is important.  · Talk with your doctor about the choices that may help you quit.  · Find out if your health insurance will pay for these treatments.  · Know the people, places, things, and activities that make you want to smoke (triggers). Avoid them.  What first steps can I take to quit smoking?  · Throw away all cigarettes at home, at work, and in your car.  · Throw away the things that you use when you smoke, such as ashtrays and lighters.  · Clean your car. Make sure to empty the ashtray.  · Clean your home, including curtains and carpets.  What can I do to help me quit smoking?  Talk with your doctor about taking medicines and seeing a counselor at the same time. You are more likely to succeed when you do both.  · If you are pregnant or breastfeeding, talk with your doctor about counseling or other ways to quit smoking. Do not take medicine to help you quit smoking unless your doctor tells you to do so.  To quit smoking:  Quit right away  · Quit smoking totally, instead of slowly cutting back on how much you smoke over a period of time.  · Go to counseling. You are more likely to quit if you go to counseling sessions regularly.  Take medicine  You may take medicines to help you quit. Some  medicines need a prescription, and some you can buy over-the-counter. Some medicines may contain a drug called nicotine to replace the nicotine in cigarettes. Medicines may:  · Help you to stop having the desire to smoke (cravings).  · Help to stop the problems that come when you stop smoking (withdrawal symptoms).  Your doctor may ask you to use:  · Nicotine patches, gum, or lozenges.  · Nicotine inhalers or sprays.  · Non-nicotine medicine that is taken by mouth.  Find resources  Find resources and other ways to help you quit smoking and remain smoke-free after you quit. These resources are most helpful when you use them often. They include:  · Online chats with a counselor.  · Phone quitlines.  · Printed self-help materials.  · Support groups or group counseling.  · Text messaging programs.  · Mobile phone apps. Use apps on your mobile phone or tablet that can help you stick to your quit plan. There are many free apps for mobile phones and tablets as well as websites. Examples include Quit Guide from the CDC and smokefree.gov    What things can I do to make it easier to quit?    · Talk to your family and friends. Ask them to support and encourage you.  · Call a phone quitline (2-890-QUITNOW), reach out to support groups, or work with a counselor.  · Ask people who smoke to not smoke around you.  · Avoid places that make you want to smoke, such as:  ? Bars.  ? Parties.  ? Smoke-break areas at work.  · Spend time with people who do not smoke.  · Lower the stress in your life. Stress can make you want to smoke. Try these things to help your stress:  ? Getting regular exercise.  ? Doing deep-breathing exercises.  ? Doing yoga.  ? Meditating.  ? Doing a body scan. To do this, close your eyes, focus on one area of your body at a time from head to toe. Notice which parts of your body are tense. Try to relax the muscles in those areas.  How will I feel when I quit smoking?  Day 1 to 3 weeks  Within the first 24 hours,  you may start to have some problems that come from quitting tobacco. These problems are very bad 2-3 days after you quit, but they do not often last for more than 2-3 weeks. You may get these symptoms:  · Mood swings.  · Feeling restless, nervous, angry, or annoyed.  · Trouble concentrating.  · Dizziness.  · Strong desire for high-sugar foods and nicotine.  · Weight gain.  · Trouble pooping (constipation).  · Feeling like you may vomit (nausea).  · Coughing or a sore throat.  · Changes in how the medicines that you take for other issues work in your body.  · Depression.  · Trouble sleeping (insomnia).  Week 3 and afterward  After the first 2-3 weeks of quitting, you may start to notice more positive results, such as:  · Better sense of smell and taste.  · Less coughing and sore throat.  · Slower heart rate.  · Lower blood pressure.  · Clearer skin.  · Better breathing.  · Fewer sick days.  Quitting smoking can be hard. Do not give up if you fail the first time. Some people need to try a few times before they succeed. Do your best to stick to your quit plan, and talk with your doctor if you have any questions or concerns.  Summary  · Smoking tobacco is the leading cause of preventable death. Quitting smoking can be hard, but it is one of the best things that you can do for your health.  · When you decide to quit smoking, make a plan to help you succeed.  · Quit smoking right away, not slowly over a period of time.  · When you start quitting, seek help from your doctor, family, or friends.  This information is not intended to replace advice given to you by your health care provider. Make sure you discuss any questions you have with your health care provider.  Document Revised: 09/11/2020 Document Reviewed: 03/07/2020  [employer] Patient Education © 2021 [employer]  Obesity, Adult  Obesity is having too much body fat. Being obese means that your weight is more than what is healthy for you.  BMI is a number that  explains how much body fat you have. If you have a BMI of 30 or more, you are obese. Obesity is often caused by eating or drinking more calories than your body uses. Changing your lifestyle can help you lose weight.  Obesity can cause serious health problems, such as:  · Stroke.  · Coronary artery disease (CAD).  · Type 2 diabetes.  · Some types of cancer, including cancers of the colon, breast, uterus, and gallbladder.  · Osteoarthritis.  · High blood pressure (hypertension).  · High cholesterol.  · Sleep apnea.  · Gallbladder stones.  · Infertility problems.  What are the causes?  · Eating meals each day that are high in calories, sugar, and fat.  · Being born with genes that may make you more likely to become obese.  · Having a medical condition that causes obesity.  · Taking certain medicines.  · Sitting a lot (having a sedentary lifestyle).  · Not getting enough sleep.  · Drinking a lot of drinks that have sugar in them.  What increases the risk?  · Having a family history of obesity.  · Being an  woman.  · Being a  man.  · Living in an area with limited access to:  ? Pepper, recreation centers, or sidewalks.  ? Healthy food choices, such as grocery stores and farmers' markets.  What are the signs or symptoms?  The main sign is having too much body fat.  How is this treated?  · Treatment for this condition often includes changing your lifestyle. Treatment may include:  ? Changing your diet. This may include making a healthy meal plan.  ? Exercise. This may include activity that causes your heart to beat faster (aerobic exercise) and strength training. Work with your doctor to design a program that works for you.  ? Medicine to help you lose weight. This may be used if you are not able to lose 1 pound a week after 6 weeks of healthy eating and more exercise.  ? Treating conditions that cause the obesity.  ? Surgery. Options may include gastric banding and gastric bypass. This may be done  if:  § Other treatments have not helped to improve your condition.  § You have a BMI of 40 or higher.  § You have life-threatening health problems related to obesity.  Follow these instructions at home:  Eating and drinking    · Follow advice from your doctor about what to eat and drink. Your doctor may tell you to:  ? Limit fast food, sweets, and processed snack foods.  ? Choose low-fat options. For example, choose low-fat milk instead of whole milk.  ? Eat 5 or more servings of fruits or vegetables each day.  ? Eat at home more often. This gives you more control over what you eat.  ? Choose healthy foods when you eat out.  ? Learn to read food labels. This will help you learn how much food is in 1 serving.  ? Keep low-fat snacks available.  ? Avoid drinks that have a lot of sugar in them. These include soda, fruit juice, iced tea with sugar, and flavored milk.  · Drink enough water to keep your pee (urine) pale yellow.  · Do not go on fad diets.  Physical activity  · Exercise often, as told by your doctor. Most adults should get up to 150 minutes of moderate-intensity exercise every week.Ask your doctor:  ? What types of exercise are safe for you.  ? How often you should exercise.  · Warm up and stretch before being active.  · Do slow stretching after being active (cool down).  · Rest between times of being active.  Lifestyle  · Work with your doctor and a food expert (dietitian) to set a weight-loss goal that is best for you.  · Limit your screen time.  · Find ways to reward yourself that do not involve food.  · Do not drink alcohol if:  ? Your doctor tells you not to drink.  ? You are pregnant, may be pregnant, or are planning to become pregnant.  · If you drink alcohol:  ? Limit how much you use to:  § 0-1 drink a day for women.  § 0-2 drinks a day for men.  ? Be aware of how much alcohol is in your drink. In the [place] one drink equals one 12 oz bottle of beer (355 mL), one 5 oz glass of wine (148 mL), or one  1½ oz glass of hard liquor (44 mL).  General instructions  · Keep a weight-loss journal. This can help you keep track of:  ? The food that you eat.  ? How much exercise you get.  · Take over-the-counter and prescription medicines only as told by your doctor.  · Take vitamins and supplements only as told by your doctor.  · Think about joining a support group.  · Keep all follow-up visits as told by your doctor. This is important.  Contact a doctor if:  · You cannot meet your weight loss goal after you have changed your diet and lifestyle for 6 weeks.  Get help right away if you:  · Are having trouble breathing.  · Are having thoughts of harming yourself.  Summary  · Obesity is having too much body fat.  · Being obese means that your weight is more than what is healthy for you.  · Work with your doctor to set a weight-loss goal.  · Get regular exercise as told by your doctor.  This information is not intended to replace advice given to you by your health care provider. Make sure you discuss any questions you have with your health care provider.  Document Revised: 08/22/2019 Document Reviewed: 08/22/2019  [employer] Patient Education © 2021 [employer]

## 2021-07-27 NOTE — PROGRESS NOTES
Subjective:     Encounter Date: 07/27/2021      Patient ID: Akua Aguilar is a 60 y.o. female   HPI: Her telephone visit today is for routine follow-up of outpatient testing.  7-day Holter monitor revealed predominantly sinus rhythm with rare supraventricular ectopic beats with APC burden of less than 0.1%, rare premature ventricular contractions with a PVC burden of 0.2%, single 3 beat run of supraventricular tachycardia at a maximum rate of 116 bpm and no significant pauses.  Patient has a history of nonobstructive coronary artery disease, hypertension, hyperlipidemia, left ventricular hypertrophy, enlargement of abdominal aorta, GERD, and smoking. US of aorta 8/14/2020 at [facility] revealed no evidence of AAA.  She continues to complain of abdominal distention. She reports chronic orthopnea. She complains of a two week history of palpitations that occur daily. Patient denies chest pain, dizziness, syncope, PND and decreased stamina. Patient denies signs of bleeding.    Chief Complaint: routine follow up  Hypertension  This is a chronic problem. The current episode started more than 1 year ago. The problem is uncontrolled. Associated symptoms include malaise/fatigue, orthopnea and palpitations. Pertinent negatives include no anxiety, blurred vision, chest pain, headaches, neck pain, peripheral edema, PND, shortness of breath or sweats. There are no associated agents to hypertension. Risk factors for coronary artery disease include dyslipidemia, post-menopausal state and obesity. Current antihypertension treatment includes alpha 1 blockers, diuretics, calcium channel blockers, beta blockers and ACE inhibitors. There is no history of angina, kidney disease, CAD/MI, CVA, heart failure, left ventricular hypertrophy, PVD or retinopathy. There is no history of chronic renal disease, coarctation of the aorta, hyperaldosteronism, hypercortisolism, hyperparathyroidism, a hypertension causing med,  pheochromocytoma, renovascular disease, sleep apnea or a thyroid problem.   Hyperlipidemia  This is a chronic problem. The current episode started more than 1 year ago. The problem is uncontrolled. Recent lipid tests were reviewed and are high. Exacerbating diseases include obesity. She has no history of chronic renal disease, diabetes, hypothyroidism, liver disease or nephrotic syndrome. Pertinent negatives include no chest pain, focal sensory loss, focal weakness, leg pain, myalgias or shortness of breath. She is currently on no antihyperlipidemic treatment. Risk factors for coronary artery disease include dyslipidemia, hypertension and post-menopausal.       Previous Cardiac Testing:  Results for orders placed during the hospital encounter of 02/25/21    Adult Transthoracic Echo Complete w/ Color, Spectral and Contrast if necessary per protocol    Interpretation Summary  · Left ventricular wall thickness is consistent with mild concentric hypertrophy.  · Left ventricular ejection fraction appears to be 56 - 60%. Left ventricular systolic function is normal.  · Abnormal global longitudinal LV strain (GLS) = -12%  · Left ventricular diastolic function was indeterminate.  · Mild pulmonary hypertension is present.    Results for orders placed during the hospital encounter of 06/19/20   Cardiac Catheterization/Vascular Study    Narrative Cardiac Catheterization Operative Report    Patient was referred for cardiac catheterization .   Indications for the procedure include: Symptoms suggestive of angina with   high suspicion for significant obstructive coronary artery disease   With ongoing chest pain    Procedure performed    Coronary angiography  Left heart catheterization  Left ventriculography  Fractional flow reserve of the left anterior descending coronary artery  Supervision of the administration of moderate sedation    Fractional flow reserve of left anterior descending coronary artery using   Ikari 3.56 Thai  guide  Usual protocol was followed.  Adenosine was used to the or hyperemia.    Guide used as mentioned above.  The guide was advanced and the ostium of the vessel was engaged.  We   zeroed the status post aortic pressure then we advanced a fractional flow   reserve wire.  This was equalized prior to crossing the lesion.  Then we   crossed the lesion.  Anticoagulation was used prior to insertion of the   wire.  After crossing the lesion adenosine was used.  This caused   hyperemia.  Fractional flow reserve was then performed and documented as   noted.  End of case the wire was removed removed angiography was performed   before and after removing the wire to document no complication and decide   results were achieved.       Procedure Details  The risks, benefits, complications, treatment options, and expected   outcomes were discussed with the patient. Plan is for moderate sedation,   and the patient agrees to proceed with the procedure.  An immediate   assessment was done prior to the administration of moderate sedation.     The patient and/or family concurred with the proposed plan, giving   informed consent. Patient was brought to the cath lab after IV hydration   was begun and oral premedication was given. The was further sedated with   Fentanyl  and Versed.    The patient was prepped and draped in the usual manner. Using the modified   Seldinger access technique, a 6f Turkmen sheath was placed in the right   radial  artery.   A left heart catheterization was done. Angiograms were also done.        Cardiac Catheterization Operative Report      Patient was referred for cardiac catheterization: High suspicion for   coronary artery disease    Procedure Details  The risks, benefits, complications, treatment options, and expected   outcomes were discussed with the patient. Plan is for moderate sedation,   and the patient agrees to proceed with the procedure.  An immediate   assessment was done prior to the  administration of moderate sedation.    The patient and/or family concurred with the proposed plan, giving   informed consent. Patient was brought to the cath lab after IV hydration   was begun and oral premedication was given.     The skin overlying the patient's right radial artery was prepped and   draped in the usual sterile fashion.  Timeout was taken to confirm the   correct patient and procedure.  Lidocaine was administered for local   anesthesia.  IV Versed and fentanyl were used to achieve conscious   sedation.  Modified Seldinger technique was then used to place a 6 Luxembourgish   sheath in the right radial artery    Diagnostic coronary angiography was performed with Tig coronary catheter.    Coronary angiogram were performed in Cook Islander and JIMÉNEZ projection to evaluate   the coronary arterial systems.      A TR band device was used to achieve hemostasis at the end of the   procedure.  The patient tolerated the procedure well, and there were no   immediate complications.    Procedural Details: After written and informed consent was obtained, the   patient was brought to the cath lab in a fasting state.       1. Selective coronary angiography:    Left main coronary artery:  The left main coronary artery arises from the   left coronary cusp and bifurcates into the  left anterior descending   coronary artery  and left circumflex arteries.      Left anterior descending artery:  The  left anterior descending coronary   artery   arises normally from the left main coronary artery and courses in   the anterior interventricular groove and terminates at the apex.  50%   stenosis noted of the proximal left anterior descending coronary artery   with large circumferential soft atherosclerotic plaque  Normal fractional flow reserve of 0.9 or higher    Left circumflex:  The left circumflex arises form the left man artery and   supplies obtuse marginal branches.  Minor atherosclerotic plaque noted in   the mid left circumflex coronary  artery which is dominant and large    Right coronary artery:  The right coronary artery  arises normally from   the right coronary cusp and is dominant for the posterior circulation.    The right coronary artery  is non-dominant and normal.    2. Left heart cath: Moderately elevated left ventricular end-diastolic   pressure of 24 mmHg.    3. LV Gram: Normal left ventricular ejection fraction of 55% with no   regional wall motion abnormalities.    4. Interventions: None    Estimated Blood Loss:  Minimal    Specimens: None         Complications:  None; patient tolerated the procedure well.           Disposition: Cardiovascular observation unit           Condition: stable            I supervised the administration of conscious sedation by nursing staff   throughout the case.  First dose was given at 1119 and the end of my   face-to-face encounter was at 1145 Hours.    During the case, continuous pulse oximetry, heart rate, blood pressure,   and patient status were monitored.       Risk, Benefits, and Alternatives discussed with the patient and/or family.    Plan is for moderate sedation, and the patient agrees to proceed with the   procedure.  An immediate assessment was done prior to the administration   of moderate sedation      Conclusion    50% stenosis of proximal left anterior descending coronary artery with a   normal fractional flow reserve 0.9 or higher with a large circumferential   plaque burden for which aggressive medical therapy is advised  Large dominant left circumflex coronary artery with minor atherosclerotic   plaque in the midsegment without any significant lesions  Right coronary artery is nondominant small and normal  Moderately elevated left ventricular end-diastolic pressure of 24 mmHg  Normal left ventricular ejection fraction of 55% without any discrete wall   motion abnormalities and no significant mitral regurgitation        Plan    Keep LDL well below 70 mg/dL  Treat for elevated left  ventricular end-diastolic pressure  Can be discharged home today after period of observation  Hydration   Observation  Risk factor modifications           Abnormal coronary CT angiography with 25 to 49% stenosis of the left   anterior descending coronary artery         The following portions of the patient's history were reviewed and updated as appropriate: allergies, current medications, past family history, past medical history, past social history, past surgical history and problem list.    Allergies   Allergen Reactions   • Aspirin GI Intolerance     Nausea and vomitting   • Latex Rash     Blisters with tape and intolerance with gloves       Current Outpatient Medications:   •  albuterol sulfate  (90 Base) MCG/ACT inhaler, Inhale 2 puffs Every 6 (Six) Hours As Needed., Disp: , Rfl:   •  amLODIPine (NORVASC) 10 MG tablet, Take 1 tablet by mouth Daily., Disp: 90 tablet, Rfl: 4  •  ARIPiprazole (ABILIFY) 10 MG tablet, Take 10 mg by mouth every night at bedtime., Disp: , Rfl:   •  atorvastatin (LIPITOR) 10 MG tablet, Take 1 tablet by mouth Daily., Disp: 30 tablet, Rfl: 11  •  Blood Pressure Monitoring (SPHYGMOMANOMETER) misc, 1 Units 2 (Two) Times a Day., Disp: 1 each, Rfl: 0  •  carvedilol (COREG) 25 MG tablet, Take 1 tablet by mouth 2 (Two) Times a Day., Disp: 180 tablet, Rfl: 3  •  cloNIDine (CATAPRES) 0.1 MG tablet, Take 1 tablet by mouth 2 (Two) Times a Day As Needed for High Blood Pressure., Disp: 180 tablet, Rfl: 3  •  cloNIDine (CATAPRES-TTS) 0.3 MG/24HR patch, PLACE 1 PATCH ON THE SKIN AS DIRECTED BY PROVIDER 1 (ONE) TIME PER WEEK., Disp: 12 patch, Rfl: 3  •  doxepin (SINEquan) 25 MG capsule, 1 CAPSULE BY MOUTH AT BEDTIME FILL ON OR AFTER 1/4/2018, Disp: , Rfl: 1  •  gabapentin (NEURONTIN) 300 MG capsule, Take 300 mg by mouth 2 (two) times a day., Disp: , Rfl:   •  gabapentin (NEURONTIN) 600 MG tablet, Take 600 mg by mouth Daily., Disp: , Rfl:   •  hydrALAZINE (APRESOLINE) 25 MG tablet, Take 1 tablet  by mouth 3 (Three) Times a Day., Disp: 270 tablet, Rfl: 3  •  hydroCHLOROthiazide (HYDRODIURIL) 50 MG tablet, Take 1 tablet by mouth Daily., Disp: 90 tablet, Rfl: 3  •  HYDROcodone-acetaminophen (NORCO)  MG per tablet, Take 1 tablet by mouth 3 (Three) Times a Day., Disp: , Rfl:   •  hydrOXYzine pamoate (VISTARIL) 25 MG capsule, Take 25 mg by mouth Every 8 (Eight) Hours As Needed., Disp: , Rfl:   •  levETIRAcetam (KEPPRA) 500 MG tablet, Take 500 mg by mouth 2 (Two) Times a Day., Disp: , Rfl:   •  lisinopril (PRINIVIL,ZESTRIL) 40 MG tablet, TAKE 1 TABLET BY MOUTH TWICE A DAY, Disp: 60 tablet, Rfl: 7  •  Potassium 99 MG tablet, Take  by mouth., Disp: , Rfl:   •  vitamin D (ERGOCALCIFEROL) 1.25 MG (29172 UT) capsule capsule, Take 1 capsule by mouth Every 7 (Seven) Days., Disp: 5 capsule, Rfl: 11  •  nitroglycerin (NITROSTAT) 0.4 MG SL tablet, Take no more than 3 doses in 15 minutes., Disp: 25 tablet, Rfl: 1  Past Medical History:   Diagnosis Date   • Arthritis    • Cancer (CMS/HCC)     CREVICAL, UTERINE   • COPD (chronic obstructive pulmonary disease) (CMS/HCC)    • Fatty liver    • GERD (gastroesophageal reflux disease)    • History of transfusion    • Hypertension    • Kidney stone    • Low back pain potentially associated with spinal stenosis    • Lupus (CMS/HCC)    • Migraines    • Scoliosis    • Seizure (CMS/HCC) 02/01/2021   • Shortness of breath    • UTI (urinary tract infection)      Past Surgical History:   Procedure Laterality Date   • BACK SURGERY      lumbar   • CARDIAC CATHETERIZATION     • CARDIAC CATHETERIZATION N/A 6/19/2020    Procedure: Cardiac Catheterization/Vascular Study Radial cath;  Surgeon: Robert Padilla MD;  Location:  [facility] CATH INVASIVE LOCATION;  Service: Cardiology;  Laterality: N/A;   • CARDIAC CATHETERIZATION N/A 4/16/2021    Procedure: Left Heart Cath;  Surgeon: Robert Padilla MD;  Location:  [facility] CATH INVASIVE LOCATION;  Service: Cardiology;  Laterality: N/A;   • CARDIAC  DEFIBRILLATOR PLACEMENT     •  SECTION     • CHOLECYSTECTOMY     • COLON RESECTION      1993 or    • COLONOSCOPY  1993   • COLONOSCOPY N/A 5/10/2018    Procedure: COLONOSCOPY WITH ANESTHESIA;  Surgeon: Shawna Valladares MD;  Location: [facility] ENDOSCOPY;  Service: Gastroenterology   • ENDOSCOPY N/A 5/10/2018    Procedure: ESOPHAGOGASTRODUODENOSCOPY WITH ANESTHESIA;  Surgeon: Shawna Valladares MD;  Location: [facility] ENDOSCOPY;  Service: Gastroenterology   • EYE SURGERY      x 2   • HYSTERECTOMY     • LEG SURGERY Right     bars/bolts placed   • UPPER GASTROINTESTINAL ENDOSCOPY  1993   • URETEROSCOPY LASER LITHOTRIPSY WITH STENT INSERTION Right 2018    Procedure: URETEROSCOPY  WITH STENT INSERTION RETROGRADE PYELOGRAM;  Surgeon: Tyrell Hardin MD;  Location: [facility] OR;  Service: Urology     Family History   Problem Relation Age of Onset   • Cancer Mother    • Heart disease Mother    • Heart disease Father    • Lupus Sister    • Heart disease Sister    • Heart disease Sister    • Lupus Sister    • Hypokalemia Sister    • Alcohol abuse Sister    • Colon cancer Neg Hx    • Colon polyps Neg Hx      Social History     Socioeconomic History   • Marital status:      Spouse name: Not on file   • Number of children: Not on file   • Years of education: Not on file   • Highest education level: Not on file   Tobacco Use   • Smoking status: Current Every Day Smoker     Packs/day: 1.00     Years: 40.00     Pack years: 40.00     Types: Cigarettes   • Smokeless tobacco: Never Used   • Tobacco comment: started smoking more since son    Vaping Use   • Vaping Use: Never used   Substance and Sexual Activity   • Alcohol use: Not Currently     Comment: quit    • Drug use: Never   • Sexual activity: Defer       Review of Systems   Constitutional: Positive for malaise/fatigue and weight gain. Negative for chills, decreased appetite, diaphoresis, fever, night sweats and weight loss.   HENT: Negative for  "nosebleeds.    Eyes: Negative for blurred vision and visual disturbance.   Cardiovascular: Positive for orthopnea and palpitations. Negative for chest pain, claudication, cyanosis, dyspnea on exertion, irregular heartbeat, leg swelling, near-syncope, paroxysmal nocturnal dyspnea and syncope.   Respiratory: Negative for cough, hemoptysis, shortness of breath, snoring and wheezing.    Endocrine: Negative for cold intolerance and heat intolerance.   Hematologic/Lymphatic: Negative for bleeding problem. Does not bruise/bleed easily.   Skin: Negative for dry skin and rash.   Musculoskeletal: Negative for back pain, falls, myalgias, neck pain and stiffness.   Gastrointestinal: Positive for bloating. Negative for abdominal pain, change in bowel habit, constipation, diarrhea, dysphagia, heartburn, nausea and vomiting.   Genitourinary: Negative for hematuria.   Neurological: Negative for dizziness, focal weakness, headaches, light-headedness, numbness and weakness.   Psychiatric/Behavioral: Negative for altered mental status. The patient does not have insomnia.    Allergic/Immunologic: Negative for persistent infections.              Objective:     Physical Exam      Procedures  /73   Ht 160 cm (62.99\")   BMI 32.25 kg/m²   Lab Review:   Lab Results   Component Value Date    WBC 7.39 04/16/2021    HGB 15.0 04/16/2021    HCT 45.4 04/16/2021    MCV 89.0 04/16/2021     04/16/2021     Lab Results   Component Value Date    GLUCOSE 89 04/16/2021    BUN 13 04/16/2021    CREATININE 0.46 (L) 04/16/2021    EGFRIFNONA 139 04/16/2021    BCR 28.3 (H) 04/16/2021    K 3.7 04/16/2021    CO2 32.0 (H) 04/16/2021    CALCIUM 9.4 04/16/2021    ALBUMIN 4.20 02/24/2021    AST 13 02/24/2021    ALT 13 02/24/2021     Lab Results   Component Value Date    CHOL 165 02/24/2021    CHOL 186 10/26/2017    CHLPL 232 (H) 04/19/2016     Lab Results   Component Value Date    TRIG 227 (H) 02/24/2021    TRIG 185 (H) 10/26/2017    TRIG 267 (H) " 04/19/2016     Lab Results   Component Value Date    HDL 60 02/24/2021    HDL 55 10/26/2017    HDL 57 04/19/2016     Lab Results   Component Value Date    LDL 68 02/24/2021     (H) 10/26/2017     (H) 04/19/2016       I have reviewed the most recent lab results.       Assessment:          Diagnosis Plan   1. Nonobstructive atherosclerosis of coronary artery  No ischemic symptoms.     2. Essential hypertension  Blood pressures have been well controlled.     3. Mixed hyperlipidemia   cholesterol well controlled.  LDL 68 on lipid panel 2/24/2021.  Continue Lipitor.   4. Enlargement of abdominal aorta (CMS/[facility]) small infra renal  US aorta 8/14/2020 - no evidence of AAA   5. LVH (left ventricular hypertrophy) mild   mild on echo 2/25/2021.   6. Gastroesophageal reflux disease without esophagitis  Managed by PCP.   7. Lupus (CMS/[facility]) Follows with rheumatology.  Normal sed rate and CRP 2/24/2021.   8. Tobacco abuse  Akua Aguilar  reports that she has been smoking cigarettes. She has a 40.00 pack-year smoking history. She has never used smokeless tobacco.. I have educated her on the risk of diseases from using tobacco products such as cancer, COPD and heart diease.     I advised her to quit and she is not willing to quit.    I spent 3  minutes counseling the patient.          9. Class 2 severe obesity due to excess calories with serious comorbidity and body mass index (BMI) of 35.0 to 35.9 in adult (CMS/[facility]) Patient's Body mass index is 32.25 kg/m². BMI is above normal parameters. Recommendations include: educational material.     10.  Palpitations  no correlation with significant arrhythmia on recent Holter monitor.          Plan:       1. Continue medications as previously prescribed.  2. Report any worsening symptoms.  3. Report any signs of bleeding.  4. Continue heart healthy diet and regular exercise as tolerated.   5. Follow up with PCP for blood pressure and cholesterol management and routine  lab work.  6.  Monitor and record daily blood pressure. Report readings consistently higher than 140/90 or consistently lower than 100/60.   7. Follow up with Dr. Padilla in three months, or sooner if needed.       This visit has been rescheduled as a phone visit to comply with patient safety concerns in accordance with CDC recommendations. Total time of discussion was 5 minutes.    You have chosen to receive care through a telephone visit. Do you consent to use a telephone visit for your medical care today? Yes

## 2021-07-29 RX ORDER — AMLODIPINE BESYLATE 10 MG/1
TABLET ORAL
Qty: 90 TABLET | Refills: 4 | Status: SHIPPED | OUTPATIENT
Start: 2021-07-29 | End: 2022-08-01

## 2021-08-26 ENCOUNTER — HOSPITAL ENCOUNTER (OUTPATIENT)
Dept: GENERAL RADIOLOGY | Age: 61
Discharge: HOME | End: 2021-08-26
Payer: MEDICAID

## 2021-08-26 DIAGNOSIS — M51.36 DDD (DEGENERATIVE DISC DISEASE), LUMBAR: ICD-10-CM

## 2021-08-26 DIAGNOSIS — M47.812 CERVICAL SPONDYLOSIS WITHOUT MYELOPATHY: ICD-10-CM

## 2021-08-26 PROCEDURE — 72040 X-RAY EXAM NECK SPINE 2-3 VW: CPT

## 2021-08-26 PROCEDURE — 72100 X-RAY EXAM L-S SPINE 2/3 VWS: CPT

## 2021-08-27 ENCOUNTER — TRANSCRIBE ORDERS (OUTPATIENT)
Dept: LAB | Facility: HOSPITAL | Age: 61
End: 2021-08-27

## 2021-08-27 DIAGNOSIS — Z01.818 PREOP TESTING: Primary | ICD-10-CM

## 2021-08-30 ENCOUNTER — LAB (OUTPATIENT)
Dept: LAB | Facility: HOSPITAL | Age: 61
End: 2021-08-30

## 2021-08-30 LAB — SARS-COV-2 ORF1AB RESP QL NAA+PROBE: NOT DETECTED

## 2021-08-30 PROCEDURE — U0004 COV-19 TEST NON-CDC HGH THRU: HCPCS | Performed by: PAIN MEDICINE

## 2021-08-30 PROCEDURE — C9803 HOPD COVID-19 SPEC COLLECT: HCPCS | Performed by: PAIN MEDICINE

## 2021-08-30 RX ORDER — ATENOLOL 50 MG/1
TABLET ORAL
Qty: 90 TABLET | Refills: 4 | OUTPATIENT
Start: 2021-08-30

## 2021-09-03 ENCOUNTER — HOSPITAL ENCOUNTER (OUTPATIENT)
Dept: ULTRASOUND IMAGING | Age: 61
Discharge: HOME | End: 2021-09-03
Payer: MEDICAID

## 2021-09-03 ENCOUNTER — OFFICE VISIT (OUTPATIENT)
Dept: VASCULAR SURGERY | Age: 61
End: 2021-09-03
Payer: MEDICAID

## 2021-09-03 VITALS
OXYGEN SATURATION: 91 % | HEART RATE: 68 BPM | HEIGHT: 63 IN | BODY MASS INDEX: 33.66 KG/M2 | WEIGHT: 190 LBS | SYSTOLIC BLOOD PRESSURE: 121 MMHG | DIASTOLIC BLOOD PRESSURE: 84 MMHG | RESPIRATION RATE: 18 BRPM | TEMPERATURE: 97.6 F

## 2021-09-03 DIAGNOSIS — I10 HYPERTENSION, UNSPECIFIED TYPE: ICD-10-CM

## 2021-09-03 DIAGNOSIS — I77.819 ECTATIC AORTA (HCC): ICD-10-CM

## 2021-09-03 DIAGNOSIS — I77.819 ECTATIC AORTA (HCC): Primary | ICD-10-CM

## 2021-09-03 PROCEDURE — 93975 VASCULAR STUDY: CPT

## 2021-09-03 PROCEDURE — 4004F PT TOBACCO SCREEN RCVD TLK: CPT | Performed by: NURSE PRACTITIONER

## 2021-09-03 PROCEDURE — 93978 VASCULAR STUDY: CPT

## 2021-09-03 PROCEDURE — G8428 CUR MEDS NOT DOCUMENT: HCPCS | Performed by: NURSE PRACTITIONER

## 2021-09-03 PROCEDURE — G8417 CALC BMI ABV UP PARAM F/U: HCPCS | Performed by: NURSE PRACTITIONER

## 2021-09-03 PROCEDURE — 99213 OFFICE O/P EST LOW 20 MIN: CPT | Performed by: NURSE PRACTITIONER

## 2021-09-03 PROCEDURE — 3017F COLORECTAL CA SCREEN DOC REV: CPT | Performed by: NURSE PRACTITIONER

## 2021-09-03 RX ORDER — CARVEDILOL 25 MG/1
25 TABLET ORAL 2 TIMES DAILY
COMMUNITY
Start: 2020-09-28

## 2021-09-03 RX ORDER — LAMOTRIGINE 25 MG/1
TABLET ORAL
COMMUNITY
Start: 2021-08-29

## 2021-09-03 RX ORDER — ATORVASTATIN CALCIUM 10 MG/1
10 TABLET, FILM COATED ORAL DAILY
COMMUNITY
Start: 2020-12-09

## 2021-09-03 NOTE — PROGRESS NOTES
Sonja Miles (:  1960) is a 61 y.o. female,Established patient, here for evaluation of the following chief complaint(s):  Follow-up (aorta renal)            SUBJECTIVE/OBJECTIVE:  She has a known history of abdominal aortic aneurysm for 1 - 5 years. She has not had abdominal pain. She has not had back pain in the cervical spine, thoracic spine, lumbar spine and sacral spine region. This pain is unchanged since the last visit. Pain is rated as 0. Gaurang Escobar is a 61 y.o. female with the following history as recorded in [facility]:  Patient Active Problem List    Diagnosis Date Noted    Vitamin D deficiency 2019    Myoclonus 2019    Essential tremor 2019    Memory loss 2019    Osteoporosis 2019    Obstructive sleep apnea 2018    Daytime somnolence 2018    Snoring 2018    Witnessed apneic spells 2018    Sleep disturbance 2018    Insomnia 2018    Ectatic aorta (HCC) 2017    Status post placement of implantable loop recorder     S/P excision of lipoma 2013    Fibrocystic breast disease 2013    Lump of breast, right 2013    Breast nodule 2013    Palpitations 2011    Vasovagal syncope 2011    Chest pain 2011    Arthritis     Lupus (HCC)     Hyperlipidemia     HTN (hypertension)     Fatigue      Current Outpatient Medications   Medication Sig Dispense Refill    PROAIR  (90 Base) MCG/ACT inhaler INHALE 2 PUFFS EVERY 6 HOURS AS NEEDED      atorvastatin (LIPITOR) 10 MG tablet Take 10 mg by mouth daily      carvedilol (COREG) 25 MG tablet Take 25 mg by mouth 2 times daily      lamoTRIgine (LAMICTAL) 25 MG tablet       Potassium 99 MG TABS Take by mouth      cloNIDine (CATAPRES) 0.3 MG/24HR PTWK PLACE 1 PATCH ON THE SKIN AS DIRECTED BY PROVIDER 1 (ONE) TIME PER WEEK.       cloNIDine (CATAPRES) 0.1 MG/24HR PTWK APPLY 1 PATCH ON THE SKIN ONCE A WEEK  vitamin D (ERGOCALCIFEROL) 08557 units CAPS capsule As directed 22 capsule 0    pantoprazole (PROTONIX) 40 MG tablet Take 40 mg by mouth daily  11    amLODIPine (NORVASC) 5 MG tablet Take 5 mg by mouth daily  5    gabapentin (NEURONTIN) 300 MG capsule Take 300 mg by mouth 2 times daily. 0    gabapentin (NEURONTIN) 600 MG tablet Take 1.5 tablets by mouth nightly. 0    lisinopril (PRINIVIL;ZESTRIL) 40 MG tablet Take 40 mg by mouth 2 times daily  5    doxepin (SINEQUAN) 100 MG capsule Take 100 mg by mouth nightly      SUMAtriptan (IMITREX) 100 MG tablet Take 100 mg by mouth once as needed for Migraine      HYDROcodone-acetaminophen (NORCO)  MG per tablet Take 1 tablet by mouth every 6 hours as needed for Pain      hydrochlorothiazide (HYDRODIURIL) 25 MG tablet Take 25 mg by mouth daily      cloNIDine (CATAPRES) 0.1 MG tablet Take 0.1 mg by mouth 2 times daily.  nitroGLYCERIN (NITROSTAT) 0.4 MG SL tablet Place 0.4 mg under the tongue every 5 minutes as needed.  levETIRAcetam (KEPPRA) 500 MG tablet Take 500 mg by mouth 2 times daily (Patient not taking: Reported on 9/3/2021)       No current facility-administered medications for this visit.      Allergies: Latex, Aspirin, Bee venom, and Pollen extract  Past Medical History:   Diagnosis Date    Acid reflux     Anxiety     Arthritis     CAD (coronary artery disease)     CAD (coronary artery disease)     Cervical dysplasia     Cervical spondylosis     Chest pain     Chronic back pain     Chronic bronchitis (HCC)     Chronic kidney disease     Chronic low back pain     Chronic pulmonary disease     COPD (chronic obstructive pulmonary disease) (HCC)     COPD (chronic obstructive pulmonary disease) (HCC)     Degeneration of cervical disc without myelopathy     Degenerative lumbar disc     Depression     Emphysema (subcutaneous) (surgical) resulting from a procedure     Fatigue     HTN (hypertension)     Hyperlipidemia     Hypertension     IBS (irritable bowel syndrome)     Lumbar spondylosis     Lump     right buttock    Lupus (HCC)     MI, old     Osteoarthritis     Osteoarthritis     Peripheral vascular disease ([facility] [address]     Sacroiliitis (HCC)     Seizures (HCC)     remote hx    Status post placement of implantable loop recorder     Syncope     Systemic lupus erythematosus ([facility] [address]     Vasovagal syncope     has a loop recorder    Warts     hands     Past Surgical History:   Procedure Laterality Date    CARDIAC CATHETERIZATION  11    Normal LV function, Normal coronaries    CARDIAC SURGERY  11    Loop Recorder placed    CARDIAC SURGERY      defibrillator (added to medical record on 3/22/19)     SECTION      GALLBLADDER SURGERY      HYSTERECTOMY      HUANG with BSO due to cervical dysplasia    LEG SURGERY      X2     LIPOMA RESECTION  10/14/13    bilat sacroiliac lipoma exc    OVARIAN CYST REMOVAL       Family History   Problem Relation Age of Onset    Coronary Art Dis Other     Diabetes Other     Stroke Other     Cancer Mother         lung, and possible breast    Cancer Sister         cervical    Early Death Maternal Grandmother     Cancer Paternal Grandmother         ovarian    Cancer Paternal Grandfather         leukemia     Social History     Tobacco Use    Smoking status: Current Every Day Smoker     Packs/day: 1.00     Years: 40.00     Pack years: 40.00    Smokeless tobacco: Never Used   Substance Use Topics    Alcohol use: No       ROS  Eyes  no sudden vision change or amaurosis. Respiratory  no significant shortness of breath,  Cardiovascular  no chest pain or syncope. No  significant leg swelling. No claudication. Musculoskeletal  no gait disturbance  Skin  no new wound. Neurologic   No speech difficulty or lateralizing weakness. All other review of systems are negative.     Physical Exam    /84 (Site: Left Upper Arm)   Pulse 68   Temp 97.6 °F (36.4 °C) (Temporal)   Resp 18   Ht 5' 3\" (1.6 m)   Wt 190 lb (86.2 kg)   SpO2 91%   BMI 33.66 kg/m²       Neck- carotid pulses 2+ to palpation with no bruit  Cardiovascular  Regular rate and rhythm. Pulmonary  effort appears normal.  No respiratory distress. Lungs - Breath sounds normal. No wheezes or rales. GI - Abdomen  soft, non tender, bowel sounds X 4 quadrants. No guarding or rebound tenderness. No distension or palpable mass. Extremities -  Radial and brachial pulses are 2+ to palpation bilaterally. Right femoral pulse: present 2+; Right popliteal pulse: absent Right DP: absent; Right PT absent; Left femoral pulse: present 2+; Left popliteal pulse: absent; Left DP: absent; Left PT: absent No cyanosis, clubbing, or significant edema. No signs atheroembolic event. Neurologic  alert and oriented X 3. Physiologic. Face symmetric. Skin  warm, dry, and intact. no wound  Psychiatric  mood, affect, and behavior appear normal.  Judgment and thought processes appear normal.    Renal u/s - No evidence of renal artery stenosis    Aortic u/s - shows 2.9 cm dilation  Individual images were reviewed. Results were reviewed with the patient. Reviewed previous studies including: u/s      ASSESSMENT/PLAN:  1. Ectatic aorta (HCC)  -     US DUPLEX SCAN OF AORTA; Future          Recommend no smoking   Proceed with 24 months with aortic u/s        Symptoms of rupture reviewed with the patient including sudden onset severe back pain or abdominal pain. This pain can sometimes radiate into the groin or leg. The patient may experience a feeling of impending doom or death. If this occurs they have been insturcted to call 911 and get to the emergency room telling them you have an aneurysm. Patient has voiced understanding. An electronic signature was used to authenticate this note.     --JYOTI Edwards

## 2021-09-24 RX ORDER — CARVEDILOL 25 MG/1
TABLET ORAL
Qty: 180 TABLET | Refills: 3 | Status: SHIPPED | OUTPATIENT
Start: 2021-09-24 | End: 2022-09-06

## 2021-11-21 RX ORDER — ATORVASTATIN CALCIUM 10 MG/1
10 TABLET, FILM COATED ORAL DAILY
Qty: 90 TABLET | Refills: 4 | Status: SHIPPED | OUTPATIENT
Start: 2021-11-21 | End: 2022-11-23

## 2021-11-30 RX ORDER — CLONIDINE HYDROCHLORIDE 0.1 MG/1
0.1 TABLET ORAL 2 TIMES DAILY PRN
Qty: 180 TABLET | Refills: 3 | Status: SHIPPED | OUTPATIENT
Start: 2021-11-30 | End: 2022-09-06

## 2021-11-30 RX ORDER — HYDROCHLOROTHIAZIDE 50 MG/1
TABLET ORAL
Qty: 90 TABLET | Refills: 3 | Status: SHIPPED | OUTPATIENT
Start: 2021-11-30 | End: 2022-09-06

## 2021-12-16 ENCOUNTER — OFFICE VISIT (OUTPATIENT)
Dept: CARDIOLOGY | Facility: CLINIC | Age: 61
End: 2021-12-16

## 2021-12-16 VITALS
DIASTOLIC BLOOD PRESSURE: 82 MMHG | HEIGHT: 63 IN | BODY MASS INDEX: 35.79 KG/M2 | HEART RATE: 77 BPM | WEIGHT: 202 LBS | SYSTOLIC BLOOD PRESSURE: 128 MMHG

## 2021-12-16 DIAGNOSIS — R06.09 DYSPNEA ON EXERTION: Primary | ICD-10-CM

## 2021-12-16 DIAGNOSIS — I10 PRIMARY HYPERTENSION: Chronic | ICD-10-CM

## 2021-12-16 DIAGNOSIS — I51.7 LVH (LEFT VENTRICULAR HYPERTROPHY): ICD-10-CM

## 2021-12-16 DIAGNOSIS — Z71.6 TOBACCO ABUSE COUNSELING: ICD-10-CM

## 2021-12-16 DIAGNOSIS — E66.01 CLASS 2 SEVERE OBESITY DUE TO EXCESS CALORIES WITH SERIOUS COMORBIDITY AND BODY MASS INDEX (BMI) OF 35.0 TO 35.9 IN ADULT (HCC): ICD-10-CM

## 2021-12-16 DIAGNOSIS — M79.89 LEG SWELLING: ICD-10-CM

## 2021-12-16 DIAGNOSIS — I77.89 ENLARGEMENT OF ABDOMINAL AORTA (HCC): ICD-10-CM

## 2021-12-16 DIAGNOSIS — Z72.0 TOBACCO ABUSE: ICD-10-CM

## 2021-12-16 DIAGNOSIS — E78.2 MIXED HYPERLIPIDEMIA: Chronic | ICD-10-CM

## 2021-12-16 PROBLEM — I20.0 UNSTABLE ANGINA (HCC): Status: RESOLVED | Noted: 2021-02-24 | Resolved: 2021-12-16

## 2021-12-16 PROCEDURE — 93000 ELECTROCARDIOGRAM COMPLETE: CPT | Performed by: INTERNAL MEDICINE

## 2021-12-16 PROCEDURE — 99214 OFFICE O/P EST MOD 30 MIN: CPT | Performed by: INTERNAL MEDICINE

## 2021-12-16 NOTE — PROGRESS NOTES
Akua Aguilar  [id number]  1960  61 y.o.  female       Referring Provider: Kannan Alcaraz MD    Reason for Follow-up Visit:      Here for routine follow up   Essential Hypertension  Non obstructive coronary artery disease   Blood pressure still elevated  Cardiac catheterization performed recently did not show any obstructive coronary artery disease      Subjective    Mild chronic exertional shortness of breath on exertion relieved with rest  No significant cough or wheezing    No palpitations  No associated chest pain  No significant pedal edema    No fever or chills  No significant expectoration    No hemoptysis  No presyncope or syncope    Tolerating current medications well with no untoward side effects   Compliant with prescribed medication regimen. Tries to adhere to cardiac diet.     No bleeding, excessive bruising, gait instability or fall risks    BP well controlled at home.       Overall feels better   Effort tolerance limited more by orthopedic rather than cardiac related issues, therefore difficult to assess functional capacity.     Has neuropathy from SLE     Still smoking but much lesser and now currently smoking 6 to 8 cigarettes a day    History of present illness:  Akua Aguilar is a 61 y.o. yo female with essential hypertension      who presents today for   Chief Complaint   Patient presents with   • Shortness of Breath     no issues    .    History  Past Medical History:   Diagnosis Date   • Arthritis    • Cancer (HCC)     CREVICAL, UTERINE   • COPD (chronic obstructive pulmonary disease) (HCC)    • Fatty liver    • GERD (gastroesophageal reflux disease)    • History of transfusion    • Hypertension    • Kidney stone    • Low back pain potentially associated with spinal stenosis    • Lupus (HCC)    • Migraines    • Scoliosis    • Seizure (HCC) 02/01/2021   • Shortness of breath    • UTI (urinary tract infection)    ,   Past Surgical History:   Procedure Laterality  Date   • BACK SURGERY      lumbar   • CARDIAC CATHETERIZATION     • CARDIAC CATHETERIZATION N/A 2020    Procedure: Cardiac Catheterization/Vascular Study Radial cath;  Surgeon: Robert Padilla MD;  Location: [facility] CATH INVASIVE LOCATION;  Service: Cardiology;  Laterality: N/A;   • CARDIAC CATHETERIZATION N/A 2021    Procedure: Left Heart Cath;  Surgeon: Robert Padilla MD;  Location: [facility] CATH INVASIVE LOCATION;  Service: Cardiology;  Laterality: N/A;   • CARDIAC DEFIBRILLATOR PLACEMENT     •  SECTION     • CHOLECYSTECTOMY     • COLON RESECTION       or    • COLONOSCOPY     • COLONOSCOPY N/A 5/10/2018    Procedure: COLONOSCOPY WITH ANESTHESIA;  Surgeon: Shawna Valladares MD;  Location: [facility] ENDOSCOPY;  Service: Gastroenterology   • ENDOSCOPY N/A 5/10/2018    Procedure: ESOPHAGOGASTRODUODENOSCOPY WITH ANESTHESIA;  Surgeon: Shawna Valladares MD;  Location: [facility] ENDOSCOPY;  Service: Gastroenterology   • EYE SURGERY      x 2   • HYSTERECTOMY     • LEG SURGERY Right     bars/bolts placed   • UPPER GASTROINTESTINAL ENDOSCOPY     • URETEROSCOPY LASER LITHOTRIPSY WITH STENT INSERTION Right 2018    Procedure: URETEROSCOPY  WITH STENT INSERTION RETROGRADE PYELOGRAM;  Surgeon: Tyrell Hardin MD;  Location: [facility] OR;  Service: Urology   ,   Family History   Problem Relation Age of Onset   • Cancer Mother    • Heart disease Mother    • Heart disease Father    • Lupus Sister    • Heart disease Sister    • Heart disease Sister    • Lupus Sister    • Hypokalemia Sister    • Alcohol abuse Sister    • Colon cancer Neg Hx    • Colon polyps Neg Hx    ,   Social History     Tobacco Use   • Smoking status: Current Every Day Smoker     Packs/day: 1.00     Years: 40.00     Pack years: 40.00     Types: Cigarettes   • Smokeless tobacco: Never Used   • Tobacco comment: started smoking more since son    Vaping Use   • Vaping Use: Never used   Substance Use Topics   • Alcohol use: Not Currently      Comment: quit 2005   • Drug use: Never   ,     Medications  Current Outpatient Medications   Medication Sig Dispense Refill   • albuterol sulfate  (90 Base) MCG/ACT inhaler Inhale 2 puffs Every 6 (Six) Hours As Needed.     • amLODIPine (NORVASC) 10 MG tablet TAKE 1 TABLET BY MOUTH EVERY DAY 90 tablet 4   • ARIPiprazole (ABILIFY) 10 MG tablet Take 10 mg by mouth every night at bedtime.     • atorvastatin (LIPITOR) 10 MG tablet Take 1 tablet by mouth Daily. 90 tablet 4   • Blood Pressure Monitoring (SPHYGMOMANOMETER) misc 1 Units 2 (Two) Times a Day. 1 each 0   • carvedilol (COREG) 25 MG tablet TAKE 1 TABLET BY MOUTH TWICE A  tablet 3   • cloNIDine (CATAPRES) 0.1 MG tablet TAKE 1 TABLET BY MOUTH 2 (TWO) TIMES A DAY AS NEEDED FOR HIGH BLOOD PRESSURE. 180 tablet 3   • cloNIDine (CATAPRES-TTS) 0.3 MG/24HR patch PLACE 1 PATCH ON THE SKIN AS DIRECTED BY PROVIDER 1 (ONE) TIME PER WEEK. 12 patch 3   • doxepin (SINEquan) 25 MG capsule 1 CAPSULE BY MOUTH AT BEDTIME FILL ON OR AFTER 1/4/2018  1   • gabapentin (NEURONTIN) 300 MG capsule Take 300 mg by mouth 2 (two) times a day.     • gabapentin (NEURONTIN) 600 MG tablet Take 600 mg by mouth Daily.     • hydrALAZINE (APRESOLINE) 25 MG tablet Take 1 tablet by mouth 3 (Three) Times a Day. 270 tablet 3   • hydroCHLOROthiazide (HYDRODIURIL) 50 MG tablet TAKE 1 TABLET BY MOUTH EVERY DAY 90 tablet 3   • HYDROcodone-acetaminophen (NORCO)  MG per tablet Take 1 tablet by mouth 3 (Three) Times a Day.     • hydrOXYzine pamoate (VISTARIL) 25 MG capsule Take 25 mg by mouth Every 8 (Eight) Hours As Needed.     • levETIRAcetam (KEPPRA) 500 MG tablet Take 500 mg by mouth 2 (Two) Times a Day.     • lisinopril (PRINIVIL,ZESTRIL) 40 MG tablet TAKE 1 TABLET BY MOUTH TWICE A DAY 60 tablet 7   • nitroglycerin (NITROSTAT) 0.4 MG SL tablet Take no more than 3 doses in 15 minutes. 25 tablet 1   • Potassium 99 MG tablet Take  by mouth.     • vitamin D (ERGOCALCIFEROL) 1.25 MG (80427  "UT) capsule capsule Take 1 capsule by mouth Every 7 (Seven) Days. 5 capsule 11     No current facility-administered medications for this visit.       Allergies:  Aspirin and Latex    Review of Systems  Review of Systems   Constitutional: Positive for malaise/fatigue.   HENT: Negative.    Eyes: Negative.    Cardiovascular: Positive for dyspnea on exertion and leg swelling. Negative for chest pain, claudication, cyanosis, irregular heartbeat, near-syncope, orthopnea, palpitations, paroxysmal nocturnal dyspnea and syncope.   Respiratory: Negative.    Endocrine: Negative.    Hematologic/Lymphatic: Negative.    Skin: Negative.    Musculoskeletal: Positive for arthritis and back pain.   Gastrointestinal: Negative for anorexia.   Genitourinary: Negative.    Neurological: Positive for weakness.   Psychiatric/Behavioral: Negative.        Objective     Physical Exam:      /82   Pulse 77   Ht 160 cm (63\")   Wt 91.6 kg (202 lb)   BMI 35.78 kg/m²     Physical Exam  Constitutional:       Appearance: She is well-developed.   HENT:      Head: Normocephalic.   Neck:      Vascular: Normal carotid pulses. No carotid bruit or JVD.      Trachea: No tracheal tenderness or tracheal deviation.   Cardiovascular:      Rate and Rhythm: Regular rhythm.      Pulses: Normal pulses.      Heart sounds: Normal heart sounds.   Pulmonary:      Effort: Pulmonary effort is normal.      Breath sounds: No stridor.   Abdominal:      General: There is no distension.      Palpations: Abdomen is soft.      Tenderness: There is no abdominal tenderness.   Musculoskeletal:      Cervical back: No edema.      Right lower le+ Pitting Edema present.      Left lower le+ Pitting Edema present.   Skin:     General: Skin is warm.   Neurological:      Mental Status: She is alert.      Cranial Nerves: No cranial nerve deficit.      Sensory: No sensory deficit.   Psychiatric:         Speech: Speech normal.         Behavior: Behavior normal. "         Results Review:    Conclusion     50% stenosis of proximal left anterior descending coronary artery with a normal fractional flow reserve 0.9 or higher with a large circumferential plaque burden for which aggressive medical therapy is advised  Large dominant left circumflex coronary artery with minor atherosclerotic plaque in the midsegment without any significant lesions  Right coronary artery is nondominant small and normal  Moderately elevated left ventricular end-diastolic pressure of 24 mmHg  Normal left ventricular ejection fraction of 55% without any discrete wall motion abnormalities and no significant mitral regurgitation        Plan     Keep LDL well below 70 mg/dL  Treat for elevated left ventricular end-diastolic pressure  Can be discharged home today after period of observation  Hydration   Observation  Risk factor modifications     IMPRESSION:      1. Total calcium score : 253.57  2.  25 to 49% stenosis noted of the mid left anterior descending coronary artery with heavy focal circumferential calcification and underlying significant stenosis is difficult to visualize.  Overall corresponds to a CAD RADS score of 2  3.  Focal calcification of the dominant left circumflex coronary artery and the non-dominant right coronary artery without any significant stenotic lesions.        Results for orders placed during the hospital encounter of 02/25/21    Adult Transthoracic Echo Complete w/ Color, Spectral and Contrast if necessary per protocol    Interpretation Summary  · Left ventricular wall thickness is consistent with mild concentric hypertrophy.  · Left ventricular ejection fraction appears to be 56 - 60%. Left ventricular systolic function is normal.  · Abnormal global longitudinal LV strain (GLS) = -12%  · Left ventricular diastolic function was indeterminate.  · Mild pulmonary hypertension is present.    Cath 2016 : CONCLUSION:  1.   Normal ejection fraction of 55% with mild mitral  regurgitation.   2.   Mildly elevated left ventricular diastolic pressure of 14 mmHg.   3.   Normal left dominant epicardial coronary arteries with 30% stenosis of the  mid left anterior descending coronary artery.   4.   Mild ectasia of the infrarenal aorta, for which serial monitoring is  required.   5.   No significant stenosis of bilateral renal arteries.   6.   Hydration, observation.   7.   Increase antianginals. Treat for microvascular angina as required down the  road.   8.   Control blood pressure.   9.   Increase clonidine from 0.1 mg daily to 0.1 mg b.i.d. and increase  atenolol from 25 to 50 mg daily.   10.  Intensive risk factor modification.   11.  Monitor the patient. If stable, can be discharged today and follow up with  me in 3 months.  12.  See Dr. Omega Wynne in 2-4 weeks.     ECG 12 Lead    Date/Time: 12/16/2021 12:30 PM  Performed by: Robert Padilla MD  Authorized by: Robert Padilla MD   Comparison: compared with previous ECG from 2/24/2021  Similar to previous ECG  Rhythm: sinus rhythm  Rate: normal  Conduction: conduction normal  Q waves: V1 and V2    QRS axis: normal    Clinical impression: abnormal EKG            Assessment/Plan   Diagnoses and all orders for this visit:    1. Dyspnea on exertion (Primary)    2. Enlargement of abdominal aorta (CMS/HCC) small infra renal    3. Primary hypertension    4. Mixed hyperlipidemia    5. Leg swelling    6. LVH (left ventricular hypertrophy) mild    7. Tobacco abuse    8. Class 2 severe obesity due to excess calories with serious comorbidity and body mass index (BMI) of 35.0 to 35.9 in adult (HCC)    9. Tobacco abuse counseling    Other orders  -     ECG 12 Lead              Plan      Patient expressed understanding  Encouraged and answered all questions   Discussed with the patient and all questioned fully answered. She will call me if any problems arise.   Discussed results of prior testing with patient : echo   as well electrocardiogram from today        Check BP and heart rates twice daily initially till blood pressures and heart rates under good control and then at least 3x / week,   If blood pressures continue to be well-controlled then can check week a month  at home and bring a recording for review next visit  If BP >130/85 or < 100/60 persistently over 3 reading 30 mins apart or if heart rates persistently above 100 bpm or less than 55 bpm call sooner for evaluation and advise     Can take PO clonidine 0.1 mg BID prn for BP > 140/90 mm Hg   Continue Clonidine patch   S/L NTG PRN for chest pain, call me or go to ER if has to use S/L nitroglycerin     Keep LDL below 70 mg/dl. Monitor liver and renal functions.   Monitor CBC, CMP, TSH (as indicated) and Lipid Panel by primary     I support the patient's decision to take the Covid -19 vaccine   Had required complete course   No major issues   Now fully immunized    Had booster too      Keep f/u with vascular surgery [facility]      Bring copies of labs and other test from primary care provider next visit     Follow up with DAVID Hernandez  or myself               Return in about 6 months (around 6/16/2022).

## 2022-05-31 RX ORDER — ERGOCALCIFEROL 1.25 MG/1
50000 CAPSULE ORAL
Qty: 5 CAPSULE | Refills: 11 | OUTPATIENT
Start: 2022-05-31

## 2022-08-01 RX ORDER — AMLODIPINE BESYLATE 10 MG/1
TABLET ORAL
Qty: 90 TABLET | Refills: 4 | Status: SHIPPED | OUTPATIENT
Start: 2022-08-01 | End: 2022-08-19

## 2022-08-01 RX ORDER — CLONIDINE 0.3 MG/24H
1 PATCH, EXTENDED RELEASE TRANSDERMAL WEEKLY
Qty: 4 PATCH | Refills: 0 | Status: SHIPPED | OUTPATIENT
Start: 2022-08-01 | End: 2022-08-29

## 2022-08-17 ENCOUNTER — TELEPHONE (OUTPATIENT)
Dept: VASCULAR SURGERY | Age: 62
End: 2022-08-17

## 2022-08-17 NOTE — TELEPHONE ENCOUNTER
Spoke to pt about upcoming appt times.   She is aware of her 8/31/22 [place] at [facility] (no eating or drinking after midnight before) and her f/u at 11:15am.  Jhony Nathan 8/17/22  11:47am

## 2022-08-18 NOTE — PROGRESS NOTES
Subjective:     Encounter Date: 8/19/2022      Patient ID: Akua gAuilar is a 61 y.o. female   HPI: Her telephone visit today is for routine follow-up.  She has nonobstructive coronary artery disease, hypertension, hyperlipidemia, left ventricular hypertrophy, enlargement of abdominal aorta, GERD, and smoking. She reports that she was taken off of lisinopril for persistent cough, and she was taken off of amlodipine due to leg swelling. She reports elevated blood pressures since being taken off of these medications. She continues to report intermittent palpitations.  She reports chronic orthopnea. Patient denies chest pain, dizziness, syncope, PND and decreased stamina. Patient denies signs of bleeding.    Chief Complaint: routine follow up  Hypertension  This is a chronic problem. The current episode started more than 1 year ago. The problem is uncontrolled. Associated symptoms include malaise/fatigue, orthopnea and palpitations. Pertinent negatives include no anxiety, blurred vision, chest pain, headaches, neck pain, peripheral edema, PND, shortness of breath or sweats. There are no associated agents to hypertension. Risk factors for coronary artery disease include dyslipidemia, post-menopausal state and obesity. Past treatments include ACE inhibitors and calcium channel blockers. Current antihypertension treatment includes alpha 1 blockers, diuretics and beta blockers. The current treatment provides moderate improvement. There is no history of angina, kidney disease, CAD/MI, CVA, heart failure, left ventricular hypertrophy, PVD or retinopathy. There is no history of chronic renal disease, coarctation of the aorta, hyperaldosteronism, hypercortisolism, hyperparathyroidism, a hypertension causing med, pheochromocytoma, renovascular disease, sleep apnea or a thyroid problem.   Hyperlipidemia  This is a chronic problem. The current episode started more than 1 year ago. The problem is uncontrolled. Recent  lipid tests were reviewed and are high. Exacerbating diseases include obesity. She has no history of chronic renal disease, diabetes, hypothyroidism, liver disease or nephrotic syndrome. Pertinent negatives include no chest pain, focal sensory loss, focal weakness, leg pain, myalgias or shortness of breath. She is currently on no antihyperlipidemic treatment. Risk factors for coronary artery disease include dyslipidemia, hypertension and post-menopausal.       Previous Cardiac Testing:  Results for orders placed during the hospital encounter of 02/25/21    Adult Transthoracic Echo Complete w/ Color, Spectral and Contrast if necessary per protocol    Interpretation Summary  · Left ventricular wall thickness is consistent with mild concentric hypertrophy.  · Left ventricular ejection fraction appears to be 56 - 60%. Left ventricular systolic function is normal.  · Abnormal global longitudinal LV strain (GLS) = -12%  · Left ventricular diastolic function was indeterminate.  · Mild pulmonary hypertension is present.    Results for orders placed during the hospital encounter of 06/19/20   Cardiac Catheterization/Vascular Study    Narrative Cardiac Catheterization Operative Report    Patient was referred for cardiac catheterization .   Indications for the procedure include: Symptoms suggestive of angina with   high suspicion for significant obstructive coronary artery disease   With ongoing chest pain    Procedure performed    Coronary angiography  Left heart catheterization  Left ventriculography  Fractional flow reserve of the left anterior descending coronary artery  Supervision of the administration of moderate sedation    Fractional flow reserve of left anterior descending coronary artery using   Ikari 3.56 Swedish guide  Usual protocol was followed.  Adenosine was used to the or hyperemia.    Guide used as mentioned above.  The guide was advanced and the ostium of the vessel was engaged.  We   zeroed the status post  aortic pressure then we advanced a fractional flow   reserve wire.  This was equalized prior to crossing the lesion.  Then we   crossed the lesion.  Anticoagulation was used prior to insertion of the   wire.  After crossing the lesion adenosine was used.  This caused   hyperemia.  Fractional flow reserve was then performed and documented as   noted.  End of case the wire was removed removed angiography was performed   before and after removing the wire to document no complication and decide   results were achieved.       Procedure Details  The risks, benefits, complications, treatment options, and expected   outcomes were discussed with the patient. Plan is for moderate sedation,   and the patient agrees to proceed with the procedure.  An immediate   assessment was done prior to the administration of moderate sedation.     The patient and/or family concurred with the proposed plan, giving   informed consent. Patient was brought to the cath lab after IV hydration   was begun and oral premedication was given. The was further sedated with   Fentanyl  and Versed.    The patient was prepped and draped in the usual manner. Using the modified   Seldinger access technique, a 6f Georgian sheath was placed in the right   radial  artery.   A left heart catheterization was done. Angiograms were also done.        Cardiac Catheterization Operative Report      Patient was referred for cardiac catheterization: High suspicion for   coronary artery disease    Procedure Details  The risks, benefits, complications, treatment options, and expected   outcomes were discussed with the patient. Plan is for moderate sedation,   and the patient agrees to proceed with the procedure.  An immediate   assessment was done prior to the administration of moderate sedation.    The patient and/or family concurred with the proposed plan, giving   informed consent. Patient was brought to the cath lab after IV hydration   was begun and oral premedication  was given.     The skin overlying the patient's right radial artery was prepped and   draped in the usual sterile fashion.  Timeout was taken to confirm the   correct patient and procedure.  Lidocaine was administered for local   anesthesia.  IV Versed and fentanyl were used to achieve conscious   sedation.  Modified Seldinger technique was then used to place a 6 Swedish   sheath in the right radial artery    Diagnostic coronary angiography was performed with Tig coronary catheter.    Coronary angiogram were performed in Azeri and JIMÉNEZ projection to evaluate   the coronary arterial systems.      A TR band device was used to achieve hemostasis at the end of the   procedure.  The patient tolerated the procedure well, and there were no   immediate complications.    Procedural Details: After written and informed consent was obtained, the   patient was brought to the cath lab in a fasting state.       1. Selective coronary angiography:    Left main coronary artery:  The left main coronary artery arises from the   left coronary cusp and bifurcates into the  left anterior descending   coronary artery  and left circumflex arteries.      Left anterior descending artery:  The  left anterior descending coronary   artery   arises normally from the left main coronary artery and courses in   the anterior interventricular groove and terminates at the apex.  50%   stenosis noted of the proximal left anterior descending coronary artery   with large circumferential soft atherosclerotic plaque  Normal fractional flow reserve of 0.9 or higher    Left circumflex:  The left circumflex arises form the left man artery and   supplies obtuse marginal branches.  Minor atherosclerotic plaque noted in   the mid left circumflex coronary artery which is dominant and large    Right coronary artery:  The right coronary artery  arises normally from   the right coronary cusp and is dominant for the posterior circulation.    The right coronary artery  is  non-dominant and normal.    2. Left heart cath: Moderately elevated left ventricular end-diastolic   pressure of 24 mmHg.    3. LV Gram: Normal left ventricular ejection fraction of 55% with no   regional wall motion abnormalities.    4. Interventions: None    Estimated Blood Loss:  Minimal    Specimens: None         Complications:  None; patient tolerated the procedure well.           Disposition: Cardiovascular observation unit           Condition: stable            I supervised the administration of conscious sedation by nursing staff   throughout the case.  First dose was given at 1119 and the end of my   face-to-face encounter was at 1145 Hours.    During the case, continuous pulse oximetry, heart rate, blood pressure,   and patient status were monitored.       Risk, Benefits, and Alternatives discussed with the patient and/or family.    Plan is for moderate sedation, and the patient agrees to proceed with the   procedure.  An immediate assessment was done prior to the administration   of moderate sedation      Conclusion    50% stenosis of proximal left anterior descending coronary artery with a   normal fractional flow reserve 0.9 or higher with a large circumferential   plaque burden for which aggressive medical therapy is advised  Large dominant left circumflex coronary artery with minor atherosclerotic   plaque in the midsegment without any significant lesions  Right coronary artery is nondominant small and normal  Moderately elevated left ventricular end-diastolic pressure of 24 mmHg  Normal left ventricular ejection fraction of 55% without any discrete wall   motion abnormalities and no significant mitral regurgitation        Plan    Keep LDL well below 70 mg/dL  Treat for elevated left ventricular end-diastolic pressure  Can be discharged home today after period of observation  Hydration   Observation  Risk factor modifications           Abnormal coronary CT angiography with 25 to 49% stenosis of the  left   anterior descending coronary artery         The following portions of the patient's history were reviewed and updated as appropriate: allergies, current medications, past family history, past medical history, past social history, past surgical history and problem list.    Allergies   Allergen Reactions   • Aspirin GI Intolerance     Nausea and vomitting   • Latex Rash     Blisters with tape and intolerance with gloves       Current Outpatient Medications:   •  albuterol sulfate  (90 Base) MCG/ACT inhaler, Inhale 2 puffs Every 6 (Six) Hours As Needed., Disp: , Rfl:   •  ARIPiprazole (ABILIFY) 10 MG tablet, Take 10 mg by mouth every night at bedtime., Disp: , Rfl:   •  atorvastatin (LIPITOR) 10 MG tablet, Take 1 tablet by mouth Daily., Disp: 90 tablet, Rfl: 4  •  Blood Pressure Monitoring (SPHYGMOMANOMETER) misc, 1 Units 2 (Two) Times a Day., Disp: 1 each, Rfl: 0  •  carvedilol (COREG) 25 MG tablet, TAKE 1 TABLET BY MOUTH TWICE A DAY, Disp: 180 tablet, Rfl: 3  •  cloNIDine (CATAPRES) 0.1 MG tablet, TAKE 1 TABLET BY MOUTH 2 (TWO) TIMES A DAY AS NEEDED FOR HIGH BLOOD PRESSURE. (Patient taking differently: Take 0.1 mg by mouth 2 (Two) Times a Day.), Disp: 180 tablet, Rfl: 3  •  cloNIDine (CATAPRES-TTS) 0.3 MG/24HR patch, PLACE 1 PATCH ON THE SKIN AS DIRECTED BY PROVIDER 1 (ONE) TIME PER WEEK., Disp: 4 patch, Rfl: 0  •  doxepin (SINEquan) 25 MG capsule, 1 CAPSULE BY MOUTH AT BEDTIME FILL ON OR AFTER 1/4/2018, Disp: , Rfl: 1  •  gabapentin (NEURONTIN) 300 MG capsule, Take 300 mg by mouth 2 (two) times a day., Disp: , Rfl:   •  gabapentin (NEURONTIN) 600 MG tablet, Take 600 mg by mouth Daily., Disp: , Rfl:   •  hydrALAZINE (APRESOLINE) 25 MG tablet, Take 1 tablet by mouth 3 (Three) Times a Day., Disp: 270 tablet, Rfl: 3  •  hydroCHLOROthiazide (HYDRODIURIL) 50 MG tablet, TAKE 1 TABLET BY MOUTH EVERY DAY, Disp: 90 tablet, Rfl: 3  •  HYDROcodone-acetaminophen (NORCO)  MG per tablet, Take 1 tablet by mouth  4 (Four) Times a Day., Disp: , Rfl:   •  nitroglycerin (NITROSTAT) 0.4 MG SL tablet, Take no more than 3 doses in 15 minutes., Disp: 25 tablet, Rfl: 1  •  omeprazole (priLOSEC) 40 MG capsule, Take 40 mg by mouth Daily., Disp: , Rfl:   •  Potassium 99 MG tablet, Take  by mouth., Disp: , Rfl:   •  vitamin D (ERGOCALCIFEROL) 1.25 MG (69503 UT) capsule capsule, Take 1 capsule by mouth Every 7 (Seven) Days., Disp: 5 capsule, Rfl: 11  •  losartan (Cozaar) 25 MG tablet, Take 1 tablet by mouth Daily., Disp: 30 tablet, Rfl: 11  Past Medical History:   Diagnosis Date   • Arthritis    • Cancer (HCC)     CREVICAL, UTERINE   • COPD (chronic obstructive pulmonary disease) (HCC)    • Fatty liver    • GERD (gastroesophageal reflux disease)    • History of transfusion    • Hypertension    • Kidney stone    • Low back pain potentially associated with spinal stenosis    • Lupus (HCC)    • Migraines    • Scoliosis    • Seizure (HCC) 2021   • Shortness of breath    • UTI (urinary tract infection)      Past Surgical History:   Procedure Laterality Date   • BACK SURGERY      lumbar   • CARDIAC CATHETERIZATION     • CARDIAC CATHETERIZATION N/A 2020    Procedure: Cardiac Catheterization/Vascular Study Radial cath;  Surgeon: Robert Padilla MD;  Location: [facility] CATH INVASIVE LOCATION;  Service: Cardiology;  Laterality: N/A;   • CARDIAC CATHETERIZATION N/A 2021    Procedure: Left Heart Cath;  Surgeon: Robert Padilla MD;  Location: [facility] CATH INVASIVE LOCATION;  Service: Cardiology;  Laterality: N/A;   • CARDIAC DEFIBRILLATOR PLACEMENT     •  SECTION     • CHOLECYSTECTOMY     • COLON RESECTION       or    • COLONOSCOPY     • COLONOSCOPY N/A 5/10/2018    Procedure: COLONOSCOPY WITH ANESTHESIA;  Surgeon: Shawna Valladares MD;  Location: [facility] ENDOSCOPY;  Service: Gastroenterology   • ENDOSCOPY N/A 5/10/2018    Procedure: ESOPHAGOGASTRODUODENOSCOPY WITH ANESTHESIA;  Surgeon: Shawna Valladares MD;  Location: [facility]  ENDOSCOPY;  Service: Gastroenterology   • EYE SURGERY      x 2   • HYSTERECTOMY     • LEG SURGERY Right     bars/bolts placed   • UPPER GASTROINTESTINAL ENDOSCOPY     • URETEROSCOPY LASER LITHOTRIPSY WITH STENT INSERTION Right 2018    Procedure: URETEROSCOPY  WITH STENT INSERTION RETROGRADE PYELOGRAM;  Surgeon: Tyrell Hardin MD;  Location: [facility] OR;  Service: Urology     Family History   Problem Relation Age of Onset   • Cancer Mother    • Heart disease Mother    • Heart disease Father    • Lupus Sister    • Heart disease Sister    • Heart disease Sister    • Lupus Sister    • Hypokalemia Sister    • Alcohol abuse Sister    • Colon cancer Neg Hx    • Colon polyps Neg Hx      Social History     Socioeconomic History   • Marital status: Significant Other   Tobacco Use   • Smoking status: Current Every Day Smoker     Packs/day: 1.50     Years: 40.00     Pack years: 60.00     Types: Cigarettes   • Smokeless tobacco: Never Used   • Tobacco comment: started smoking more since son    Vaping Use   • Vaping Use: Never used   Substance and Sexual Activity   • Alcohol use: Not Currently     Comment: quit    • Drug use: Never   • Sexual activity: Defer       Review of Systems   Constitutional: Positive for malaise/fatigue. Negative for chills, decreased appetite, diaphoresis, fever, night sweats, weight gain and weight loss.   HENT: Negative for nosebleeds.    Eyes: Negative for blurred vision and visual disturbance.   Cardiovascular: Positive for orthopnea and palpitations. Negative for chest pain, claudication, cyanosis, dyspnea on exertion, irregular heartbeat, leg swelling, near-syncope, paroxysmal nocturnal dyspnea and syncope.   Respiratory: Negative for cough, hemoptysis, shortness of breath, snoring and wheezing.    Endocrine: Negative for cold intolerance and heat intolerance.   Hematologic/Lymphatic: Negative for bleeding problem. Does not bruise/bleed easily.   Skin: Negative for dry skin  "and rash.   Musculoskeletal: Negative for back pain, falls, myalgias, neck pain and stiffness.   Gastrointestinal: Negative for bloating, abdominal pain, change in bowel habit, constipation, diarrhea, dysphagia, heartburn, nausea and vomiting.   Genitourinary: Negative for hematuria.   Neurological: Negative for dizziness, focal weakness, headaches, light-headedness, numbness and weakness.   Psychiatric/Behavioral: Negative for altered mental status. The patient does not have insomnia.    Allergic/Immunologic: Negative for persistent infections.     I have reviewed and confirmed the accuracy of the DAVID Squires               Objective:     Physical Exam      Procedures  Ht 160 cm (63\")   BMI 35.78 kg/m²   Lab Review:   Lab Results   Component Value Date    WBC 7.39 04/16/2021    HGB 15.0 04/16/2021    HCT 45.4 04/16/2021    MCV 89.0 04/16/2021     04/16/2021     Lab Results   Component Value Date    GLUCOSE 89 04/16/2021    BUN 13 04/16/2021    CREATININE 0.46 (L) 04/16/2021    EGFRIFNONA 139 04/16/2021    BCR 28.3 (H) 04/16/2021    K 3.7 04/16/2021    CO2 32.0 (H) 04/16/2021    CALCIUM 9.4 04/16/2021    ALBUMIN 4.20 02/24/2021    AST 13 02/24/2021    ALT 13 02/24/2021     Lab Results   Component Value Date    CHOL 165 02/24/2021    CHOL 186 10/26/2017    CHLPL 232 (H) 04/19/2016     Lab Results   Component Value Date    TRIG 227 (H) 02/24/2021    TRIG 185 (H) 10/26/2017    TRIG 267 (H) 04/19/2016     Lab Results   Component Value Date    HDL 60 02/24/2021    HDL 55 10/26/2017    HDL 57 04/19/2016     Lab Results   Component Value Date    LDL 68 02/24/2021     (H) 10/26/2017     (H) 04/19/2016       I have reviewed the most recent lab results.       Assessment:          Diagnosis Plan   1. Nonobstructive atherosclerosis of coronary artery  No ischemic symptoms.  Stable.   2. Primary hypertension  Blood pressures have been elevated. Start losartan 25 mg daily. Continue clonidine " patch, HCTZ, carvedilol, hydralazine and oral clonidine.   3. Mixed hyperlipidemia   management per PCP.  Continue Lipitor.   4. Enlargement of abdominal aorta (CMS/[facility]) small infra renal  US aorta 8/14/2020 - no evidence of AAA   5. LVH (left ventricular hypertrophy) mild   mild on echo 2/25/2021.  Stable.   6. Tobacco abuse  Akua Aguilar  reports that she has been smoking cigarettes. She has a 60.00 pack-year smoking history. She has never used smokeless tobacco.. I have educated her on the risk of diseases from using tobacco products such as cancer, COPD and heart diease.     I advised her to quit and she is not willing to quit.    I spent 3  minutes counseling the patient.          7. Class 2 severe obesity due to excess calories with serious comorbidity and body mass index (BMI) of 35.0 to 35.9 in adult (CMS/[facility]) Patient's Body mass index is 35.78 kg/m². BMI is above normal parameters. Recommendations include: educational material.            Plan:       1. Start losartan 25 mg daily. Continue other medications as previously prescribed.  2. Report any worsening symptoms.  3. Report any signs of bleeding.  4. Continue heart healthy diet and regular exercise as tolerated.   5. Follow up with PCP for blood pressure and cholesterol management and routine lab work.  6.  Monitor and record daily blood pressure. Report readings consistently higher than 130/90 or consistently lower than 100/60.   7. Follow up in one month, or sooner if needed.         You have chosen to receive care through a telephone visit. Do you consent to use a telephone visit for your medical care today? Yes  This visit has been rescheduled as a phone visit to comply with patient safety concerns in accordance with CDC recommendations. Total time of discussion was 6 minutes.

## 2022-08-19 ENCOUNTER — OFFICE VISIT (OUTPATIENT)
Dept: CARDIOLOGY | Facility: CLINIC | Age: 62
End: 2022-08-19

## 2022-08-19 VITALS — BODY MASS INDEX: 35.78 KG/M2 | HEIGHT: 63 IN

## 2022-08-19 DIAGNOSIS — I77.89 ENLARGEMENT OF ABDOMINAL AORTA: ICD-10-CM

## 2022-08-19 DIAGNOSIS — E66.01 CLASS 2 SEVERE OBESITY DUE TO EXCESS CALORIES WITH SERIOUS COMORBIDITY AND BODY MASS INDEX (BMI) OF 35.0 TO 35.9 IN ADULT: ICD-10-CM

## 2022-08-19 DIAGNOSIS — I10 PRIMARY HYPERTENSION: Chronic | ICD-10-CM

## 2022-08-19 DIAGNOSIS — I25.10 NONOBSTRUCTIVE ATHEROSCLEROSIS OF CORONARY ARTERY: Primary | ICD-10-CM

## 2022-08-19 DIAGNOSIS — E78.2 MIXED HYPERLIPIDEMIA: Chronic | ICD-10-CM

## 2022-08-19 DIAGNOSIS — I51.7 LVH (LEFT VENTRICULAR HYPERTROPHY): ICD-10-CM

## 2022-08-19 DIAGNOSIS — Z72.0 TOBACCO ABUSE: ICD-10-CM

## 2022-08-19 PROCEDURE — 99441 PR PHYS/QHP TELEPHONE EVALUATION 5-10 MIN: CPT | Performed by: NURSE PRACTITIONER

## 2022-08-19 RX ORDER — LOSARTAN POTASSIUM 25 MG/1
25 TABLET ORAL DAILY
Qty: 30 TABLET | Refills: 11 | Status: SHIPPED | OUTPATIENT
Start: 2022-08-19

## 2022-08-19 RX ORDER — OMEPRAZOLE 40 MG/1
20 CAPSULE, DELAYED RELEASE ORAL 2 TIMES DAILY
COMMUNITY
Start: 2022-08-09

## 2022-08-29 RX ORDER — CLONIDINE 0.3 MG/24H
1 PATCH, EXTENDED RELEASE TRANSDERMAL WEEKLY
Qty: 12 PATCH | Refills: 3 | Status: SHIPPED | OUTPATIENT
Start: 2022-08-29

## 2022-09-06 RX ORDER — HYDRALAZINE HYDROCHLORIDE 25 MG/1
TABLET, FILM COATED ORAL
Qty: 270 TABLET | Refills: 3 | Status: SHIPPED | OUTPATIENT
Start: 2022-09-06 | End: 2022-09-23

## 2022-09-06 RX ORDER — CLONIDINE HYDROCHLORIDE 0.1 MG/1
TABLET ORAL
Qty: 180 TABLET | Refills: 3 | Status: SHIPPED | OUTPATIENT
Start: 2022-09-06

## 2022-09-06 RX ORDER — CARVEDILOL 25 MG/1
TABLET ORAL
Qty: 180 TABLET | Refills: 3 | Status: SHIPPED | OUTPATIENT
Start: 2022-09-06

## 2022-09-06 RX ORDER — HYDROCHLOROTHIAZIDE 50 MG/1
TABLET ORAL
Qty: 90 TABLET | Refills: 3 | Status: SHIPPED | OUTPATIENT
Start: 2022-09-06

## 2022-09-21 ENCOUNTER — TELEPHONE (OUTPATIENT)
Dept: VASCULAR SURGERY | Age: 62
End: 2022-09-21

## 2022-09-21 NOTE — TELEPHONE ENCOUNTER
Central scheduling could not assign/attach referral for the order for Aorta test on 10/4/22. Please advise or correct. Thank you.

## 2022-09-22 NOTE — PROGRESS NOTES
Subjective:     Encounter Date: 9/23/2022    Patient ID: Akua Aguilar is a 61 y.o. female   HPI: Her telephone visit today is for routine follow-up of medication adjustment.  She was started on losartan 25 mg daily at her last telehealth visit on 8/19/2022 for uncontrolled hypertension.  She has nonobstructive coronary artery disease, hypertension, hyperlipidemia, left ventricular hypertrophy, enlargement of abdominal aorta, GERD, and smoking. She continues to report intermittent palpitations.  She reports chronic orthopnea. Patient denies chest pain, dizziness, syncope, PND and decreased stamina. Patient denies signs of bleeding.    Chief Complaint: routine follow up  Hypertension  This is a chronic problem. The current episode started more than 1 year ago. The problem is controlled. Associated symptoms include malaise/fatigue, orthopnea and palpitations. Pertinent negatives include no anxiety, blurred vision, chest pain, headaches, neck pain, peripheral edema, PND, shortness of breath or sweats. There are no associated agents to hypertension. Risk factors for coronary artery disease include dyslipidemia, post-menopausal state and obesity. Past treatments include ACE inhibitors and calcium channel blockers. Current antihypertension treatment includes alpha 1 blockers, diuretics and beta blockers. The current treatment provides moderate improvement. There is no history of angina, kidney disease, CAD/MI, CVA, heart failure, left ventricular hypertrophy, PVD or retinopathy. There is no history of chronic renal disease, coarctation of the aorta, hyperaldosteronism, hypercortisolism, hyperparathyroidism, a hypertension causing med, pheochromocytoma, renovascular disease, sleep apnea or a thyroid problem.   Hyperlipidemia  This is a chronic problem. The current episode started more than 1 year ago. The problem is uncontrolled. Recent lipid tests were reviewed and are high. Exacerbating diseases include obesity.  She has no history of chronic renal disease, diabetes, hypothyroidism, liver disease or nephrotic syndrome. Pertinent negatives include no chest pain, focal sensory loss, focal weakness, leg pain, myalgias or shortness of breath. She is currently on no antihyperlipidemic treatment. Risk factors for coronary artery disease include dyslipidemia, hypertension and post-menopausal.       Previous Cardiac Testing:  Results for orders placed during the hospital encounter of 02/25/21    Adult Transthoracic Echo Complete w/ Color, Spectral and Contrast if necessary per protocol    Interpretation Summary  · Left ventricular wall thickness is consistent with mild concentric hypertrophy.  · Left ventricular ejection fraction appears to be 56 - 60%. Left ventricular systolic function is normal.  · Abnormal global longitudinal LV strain (GLS) = -12%  · Left ventricular diastolic function was indeterminate.  · Mild pulmonary hypertension is present.    Results for orders placed during the hospital encounter of 06/19/20   Cardiac Catheterization/Vascular Study    Narrative Cardiac Catheterization Operative Report    Patient was referred for cardiac catheterization .   Indications for the procedure include: Symptoms suggestive of angina with   high suspicion for significant obstructive coronary artery disease   With ongoing chest pain    Procedure performed    Coronary angiography  Left heart catheterization  Left ventriculography  Fractional flow reserve of the left anterior descending coronary artery  Supervision of the administration of moderate sedation    Fractional flow reserve of left anterior descending coronary artery using   Ikari 3.56 Portuguese guide  Usual protocol was followed.  Adenosine was used to the or hyperemia.    Guide used as mentioned above.  The guide was advanced and the ostium of the vessel was engaged.  We   zeroed the status post aortic pressure then we advanced a fractional flow   reserve wire.  This was  equalized prior to crossing the lesion.  Then we   crossed the lesion.  Anticoagulation was used prior to insertion of the   wire.  After crossing the lesion adenosine was used.  This caused   hyperemia.  Fractional flow reserve was then performed and documented as   noted.  End of case the wire was removed removed angiography was performed   before and after removing the wire to document no complication and decide   results were achieved.       Procedure Details  The risks, benefits, complications, treatment options, and expected   outcomes were discussed with the patient. Plan is for moderate sedation,   and the patient agrees to proceed with the procedure.  An immediate   assessment was done prior to the administration of moderate sedation.     The patient and/or family concurred with the proposed plan, giving   informed consent. Patient was brought to the cath lab after IV hydration   was begun and oral premedication was given. The was further sedated with   Fentanyl  and Versed.    The patient was prepped and draped in the usual manner. Using the modified   Seldinger access technique, a 6f Occitan sheath was placed in the right   radial  artery.   A left heart catheterization was done. Angiograms were also done.        Cardiac Catheterization Operative Report      Patient was referred for cardiac catheterization: High suspicion for   coronary artery disease    Procedure Details  The risks, benefits, complications, treatment options, and expected   outcomes were discussed with the patient. Plan is for moderate sedation,   and the patient agrees to proceed with the procedure.  An immediate   assessment was done prior to the administration of moderate sedation.    The patient and/or family concurred with the proposed plan, giving   informed consent. Patient was brought to the cath lab after IV hydration   was begun and oral premedication was given.     The skin overlying the patient's right radial artery was  prepped and   draped in the usual sterile fashion.  Timeout was taken to confirm the   correct patient and procedure.  Lidocaine was administered for local   anesthesia.  IV Versed and fentanyl were used to achieve conscious   sedation.  Modified Seldinger technique was then used to place a 6 Telugu   sheath in the right radial artery    Diagnostic coronary angiography was performed with Tig coronary catheter.    Coronary angiogram were performed in VANCE and JIMÉNEZ projection to evaluate   the coronary arterial systems.      A TR band device was used to achieve hemostasis at the end of the   procedure.  The patient tolerated the procedure well, and there were no   immediate complications.    Procedural Details: After written and informed consent was obtained, the   patient was brought to the cath lab in a fasting state.       1. Selective coronary angiography:    Left main coronary artery:  The left main coronary artery arises from the   left coronary cusp and bifurcates into the  left anterior descending   coronary artery  and left circumflex arteries.      Left anterior descending artery:  The  left anterior descending coronary   artery   arises normally from the left main coronary artery and courses in   the anterior interventricular groove and terminates at the apex.  50%   stenosis noted of the proximal left anterior descending coronary artery   with large circumferential soft atherosclerotic plaque  Normal fractional flow reserve of 0.9 or higher    Left circumflex:  The left circumflex arises form the left man artery and   supplies obtuse marginal branches.  Minor atherosclerotic plaque noted in   the mid left circumflex coronary artery which is dominant and large    Right coronary artery:  The right coronary artery  arises normally from   the right coronary cusp and is dominant for the posterior circulation.    The right coronary artery  is non-dominant and normal.    2. Left heart cath: Moderately elevated left  ventricular end-diastolic   pressure of 24 mmHg.    3. LV Gram: Normal left ventricular ejection fraction of 55% with no   regional wall motion abnormalities.    4. Interventions: None    Estimated Blood Loss:  Minimal    Specimens: None         Complications:  None; patient tolerated the procedure well.           Disposition: Cardiovascular observation unit           Condition: stable            I supervised the administration of conscious sedation by nursing staff   throughout the case.  First dose was given at 1119 and the end of my   face-to-face encounter was at 1145 Hours.    During the case, continuous pulse oximetry, heart rate, blood pressure,   and patient status were monitored.       Risk, Benefits, and Alternatives discussed with the patient and/or family.    Plan is for moderate sedation, and the patient agrees to proceed with the   procedure.  An immediate assessment was done prior to the administration   of moderate sedation      Conclusion    50% stenosis of proximal left anterior descending coronary artery with a   normal fractional flow reserve 0.9 or higher with a large circumferential   plaque burden for which aggressive medical therapy is advised  Large dominant left circumflex coronary artery with minor atherosclerotic   plaque in the midsegment without any significant lesions  Right coronary artery is nondominant small and normal  Moderately elevated left ventricular end-diastolic pressure of 24 mmHg  Normal left ventricular ejection fraction of 55% without any discrete wall   motion abnormalities and no significant mitral regurgitation        Plan    Keep LDL well below 70 mg/dL  Treat for elevated left ventricular end-diastolic pressure  Can be discharged home today after period of observation  Hydration   Observation  Risk factor modifications           Abnormal coronary CT angiography with 25 to 49% stenosis of the left   anterior descending coronary artery         The following portions  of the patient's history were reviewed and updated as appropriate: allergies, current medications, past family history, past medical history, past social history, past surgical history and problem list.    Allergies   Allergen Reactions   • Aspirin GI Intolerance     Nausea and vomitting   • Latex Rash     Blisters with tape and intolerance with gloves       Current Outpatient Medications:   •  albuterol sulfate  (90 Base) MCG/ACT inhaler, Inhale 2 puffs Every 6 (Six) Hours As Needed., Disp: , Rfl:   •  ARIPiprazole (ABILIFY) 10 MG tablet, Take 10 mg by mouth every night at bedtime., Disp: , Rfl:   •  atorvastatin (LIPITOR) 10 MG tablet, Take 1 tablet by mouth Daily., Disp: 90 tablet, Rfl: 4  •  Blood Pressure Monitoring (SPHYGMOMANOMETER) misc, 1 Units 2 (Two) Times a Day., Disp: 1 each, Rfl: 0  •  carvedilol (COREG) 25 MG tablet, TAKE 1 TABLET BY MOUTH TWICE A DAY, Disp: 180 tablet, Rfl: 3  •  cholecalciferol (VITAMIN D3) 25 MCG (1000 UT) tablet, Take 1,000 Units by mouth Daily., Disp: , Rfl:   •  cloNIDine (CATAPRES) 0.1 MG tablet, TAKE 1 TABLET BY MOUTH TWICE A DAY AS NEEDED FOR HIGH BLOOD PRESSURE (Patient taking differently: 0.1 mg 2 (Two) Times a Day.), Disp: 180 tablet, Rfl: 3  •  cloNIDine (CATAPRES-TTS) 0.3 MG/24HR patch, PLACE 1 PATCH ON THE SKIN AS DIRECTED BY PROVIDER 1 (ONE) TIME PER WEEK., Disp: 12 patch, Rfl: 3  •  doxepin (SINEquan) 25 MG capsule, 1 CAPSULE BY MOUTH AT BEDTIME FILL ON OR AFTER 1/4/2018, Disp: , Rfl: 1  •  gabapentin (NEURONTIN) 300 MG capsule, Take 300 mg by mouth 2 (two) times a day., Disp: , Rfl:   •  gabapentin (NEURONTIN) 600 MG tablet, Take 600 mg by mouth Daily., Disp: , Rfl:   •  hydroCHLOROthiazide (HYDRODIURIL) 50 MG tablet, TAKE 1 TABLET BY MOUTH EVERY DAY, Disp: 90 tablet, Rfl: 3  •  HYDROcodone-acetaminophen (NORCO)  MG per tablet, Take 1 tablet by mouth 4 (Four) Times a Day., Disp: , Rfl:   •  losartan (Cozaar) 25 MG tablet, Take 1 tablet by mouth Daily.,  Disp: 30 tablet, Rfl: 11  •  nitroglycerin (NITROSTAT) 0.4 MG SL tablet, Take no more than 3 doses in 15 minutes., Disp: 25 tablet, Rfl: 1  •  omeprazole (priLOSEC) 40 MG capsule, Take 20 mg by mouth 2 (Two) Times a Day., Disp: , Rfl:   •  Potassium 99 MG tablet, Take  by mouth., Disp: , Rfl:   •  vitamin D (ERGOCALCIFEROL) 1.25 MG (03934 UT) capsule capsule, Take 1 capsule by mouth Every 7 (Seven) Days., Disp: 5 capsule, Rfl: 11  Past Medical History:   Diagnosis Date   • Arthritis    • Cancer (HCC)     CREVICAL, UTERINE   • COPD (chronic obstructive pulmonary disease) (HCC)    • Fatty liver    • GERD (gastroesophageal reflux disease)    • History of transfusion    • Hypertension    • Kidney stone    • Low back pain potentially associated with spinal stenosis    • Lupus (HCC)    • Migraines    • Scoliosis    • Seizure (HCC) 2021   • Shortness of breath    • UTI (urinary tract infection)      Past Surgical History:   Procedure Laterality Date   • BACK SURGERY      lumbar   • CARDIAC CATHETERIZATION     • CARDIAC CATHETERIZATION N/A 2020    Procedure: Cardiac Catheterization/Vascular Study Radial cath;  Surgeon: Robert Padilla MD;  Location: [facility] CATH INVASIVE LOCATION;  Service: Cardiology;  Laterality: N/A;   • CARDIAC CATHETERIZATION N/A 2021    Procedure: Left Heart Cath;  Surgeon: Robert Padilla MD;  Location: [facility] CATH INVASIVE LOCATION;  Service: Cardiology;  Laterality: N/A;   • CARDIAC DEFIBRILLATOR PLACEMENT     •  SECTION     • CHOLECYSTECTOMY     • COLON RESECTION       or    • COLONOSCOPY     • COLONOSCOPY N/A 5/10/2018    Procedure: COLONOSCOPY WITH ANESTHESIA;  Surgeon: Shawna Valladares MD;  Location: [facility] ENDOSCOPY;  Service: Gastroenterology   • ENDOSCOPY N/A 5/10/2018    Procedure: ESOPHAGOGASTRODUODENOSCOPY WITH ANESTHESIA;  Surgeon: Shawna Valladares MD;  Location: [facility] ENDOSCOPY;  Service: Gastroenterology   • EYE SURGERY      x 2   • HYSTERECTOMY     • LEG  SURGERY Right     bars/bolts placed   • UPPER GASTROINTESTINAL ENDOSCOPY     • URETEROSCOPY LASER LITHOTRIPSY WITH STENT INSERTION Right 2018    Procedure: URETEROSCOPY  WITH STENT INSERTION RETROGRADE PYELOGRAM;  Surgeon: Tyrell Hardin MD;  Location: [facility] OR;  Service: Urology     Family History   Problem Relation Age of Onset   • Cancer Mother    • Heart disease Mother    • Heart disease Father    • Lupus Sister    • Heart disease Sister    • Heart disease Sister    • Lupus Sister    • Hypokalemia Sister    • Alcohol abuse Sister    • Colon cancer Neg Hx    • Colon polyps Neg Hx      Social History     Socioeconomic History   • Marital status: Significant Other   Tobacco Use   • Smoking status: Current Every Day Smoker     Packs/day: 1.00     Years: 40.00     Pack years: 40.00     Types: Cigarettes   • Smokeless tobacco: Never Used   • Tobacco comment: started smoking more since son    Vaping Use   • Vaping Use: Never used   Substance and Sexual Activity   • Alcohol use: Not Currently     Comment: quit    • Drug use: Never   • Sexual activity: Defer       Review of Systems   Constitutional: Positive for malaise/fatigue. Negative for chills, decreased appetite, diaphoresis, fever, night sweats, weight gain and weight loss.   HENT: Negative for nosebleeds.    Eyes: Negative for blurred vision and visual disturbance.   Cardiovascular: Positive for orthopnea and palpitations. Negative for chest pain, claudication, cyanosis, dyspnea on exertion, irregular heartbeat, leg swelling, near-syncope, paroxysmal nocturnal dyspnea and syncope.   Respiratory: Negative for cough, hemoptysis, shortness of breath, snoring and wheezing.    Endocrine: Negative for cold intolerance and heat intolerance.   Hematologic/Lymphatic: Negative for bleeding problem. Does not bruise/bleed easily.   Skin: Negative for dry skin and rash.   Musculoskeletal: Negative for back pain, falls, myalgias, neck pain and  "stiffness.   Gastrointestinal: Negative for bloating, abdominal pain, change in bowel habit, constipation, diarrhea, dysphagia, heartburn, nausea and vomiting.   Genitourinary: Negative for hematuria.   Neurological: Negative for dizziness, focal weakness, headaches, light-headedness, numbness and weakness.   Psychiatric/Behavioral: Negative for altered mental status. The patient does not have insomnia.    Allergic/Immunologic: Negative for persistent infections.     I have reviewed and confirmed the accuracy of the ROS  DAVID Proctor                 Objective:     Physical Exam      Procedures  /86   Pulse 71   Ht 160 cm (63\")   BMI 35.78 kg/m²   Lab Review:   Lab Results   Component Value Date    WBC 7.39 04/16/2021    HGB 15.0 04/16/2021    HCT 45.4 04/16/2021    MCV 89.0 04/16/2021     04/16/2021     Lab Results   Component Value Date    GLUCOSE 89 04/16/2021    BUN 13 04/16/2021    CREATININE 0.46 (L) 04/16/2021    EGFRIFNONA 139 04/16/2021    BCR 28.3 (H) 04/16/2021    K 3.7 04/16/2021    CO2 32.0 (H) 04/16/2021    CALCIUM 9.4 04/16/2021    ALBUMIN 4.20 02/24/2021    AST 13 02/24/2021    ALT 13 02/24/2021     Lab Results   Component Value Date    CHOL 165 02/24/2021    CHOL 186 10/26/2017    CHLPL 232 (H) 04/19/2016     Lab Results   Component Value Date    TRIG 227 (H) 02/24/2021    TRIG 185 (H) 10/26/2017    TRIG 267 (H) 04/19/2016     Lab Results   Component Value Date    HDL 60 02/24/2021    HDL 55 10/26/2017    HDL 57 04/19/2016     Lab Results   Component Value Date    LDL 68 02/24/2021     (H) 10/26/2017     (H) 04/19/2016       I have reviewed the most recent lab results.       Assessment:          Diagnosis Plan   1. Nonobstructive atherosclerosis of coronary artery  No ischemic symptoms.  Stable.   2. Primary hypertension  Blood pressures are well controlled.  Continue losartan, clonidine patch, HCTZ, carvedilol, hydralazine and oral clonidine.   3. Mixed " hyperlipidemia   management per PCP.  Continue Lipitor.   4. Enlargement of abdominal aorta (CMS/HCC) small infra renal  US aorta 8/14/2020 - no evidence of AAA   5. LVH (left ventricular hypertrophy) mild   mild on echo 2/25/2021.  Stable.   6. Tobacco abuse  Akua Aguilar  reports that she has been smoking cigarettes. She has a 40.00 pack-year smoking history. She has never used smokeless tobacco.. I have educated her on the risk of diseases from using tobacco products such as cancer, COPD and heart diease.     I advised her to quit and she is not willing to quit.    I spent 3  minutes counseling the patient.          7. Class 2 severe obesity due to excess calories with serious comorbidity and body mass index (BMI) of 35.0 to 35.9 in adult (CMS/[facility]) Patient's Body mass index is 35.78 kg/m². BMI is above normal parameters. Recommendations include: educational material.            Plan:       1. Continue medications as previously prescribed.  2. Report any worsening symptoms.  3. Report any signs of bleeding.  4. Continue heart healthy diet and regular exercise as tolerated.   5. Follow up with PCP for blood pressure and cholesterol management and routine lab work.  6.  Monitor and record daily blood pressure. Report readings consistently higher than 130/90 or consistently lower than 100/60.   7. Follow up in 6 months, or sooner if needed.         This visit has been rescheduled as a phone visit to comply with patient safety concerns in accordance with CDC recommendations. Total time of discussion was 5 minutes.    You have chosen to receive care through a telephone visit. Do you consent to use a telephone visit for your medical care today? Yes

## 2022-09-23 ENCOUNTER — OFFICE VISIT (OUTPATIENT)
Dept: CARDIOLOGY | Facility: CLINIC | Age: 62
End: 2022-09-23

## 2022-09-23 VITALS
HEIGHT: 63 IN | DIASTOLIC BLOOD PRESSURE: 86 MMHG | SYSTOLIC BLOOD PRESSURE: 123 MMHG | HEART RATE: 71 BPM | BODY MASS INDEX: 35.78 KG/M2

## 2022-09-23 DIAGNOSIS — E78.2 MIXED HYPERLIPIDEMIA: Chronic | ICD-10-CM

## 2022-09-23 DIAGNOSIS — E66.01 CLASS 2 SEVERE OBESITY DUE TO EXCESS CALORIES WITH SERIOUS COMORBIDITY AND BODY MASS INDEX (BMI) OF 35.0 TO 35.9 IN ADULT: ICD-10-CM

## 2022-09-23 DIAGNOSIS — I10 PRIMARY HYPERTENSION: Chronic | ICD-10-CM

## 2022-09-23 DIAGNOSIS — I77.89 ENLARGEMENT OF ABDOMINAL AORTA: ICD-10-CM

## 2022-09-23 DIAGNOSIS — I51.7 LVH (LEFT VENTRICULAR HYPERTROPHY): ICD-10-CM

## 2022-09-23 DIAGNOSIS — I25.10 NONOBSTRUCTIVE ATHEROSCLEROSIS OF CORONARY ARTERY: Primary | ICD-10-CM

## 2022-09-23 DIAGNOSIS — Z72.0 TOBACCO ABUSE: ICD-10-CM

## 2022-09-23 PROCEDURE — 99441 PR PHYS/QHP TELEPHONE EVALUATION 5-10 MIN: CPT | Performed by: NURSE PRACTITIONER

## 2022-09-23 RX ORDER — MELATONIN
1000 DAILY
COMMUNITY

## 2022-10-04 ENCOUNTER — OFFICE VISIT (OUTPATIENT)
Dept: VASCULAR SURGERY | Age: 62
End: 2022-10-04
Payer: MEDICAID

## 2022-10-04 ENCOUNTER — HOSPITAL ENCOUNTER (OUTPATIENT)
Dept: ULTRASOUND IMAGING | Age: 62
Discharge: HOME | End: 2022-10-04
Payer: MEDICAID

## 2022-10-04 VITALS
HEART RATE: 82 BPM | TEMPERATURE: 97.7 F | OXYGEN SATURATION: 98 % | DIASTOLIC BLOOD PRESSURE: 82 MMHG | SYSTOLIC BLOOD PRESSURE: 112 MMHG

## 2022-10-04 DIAGNOSIS — I77.819 ECTATIC AORTA (HCC): ICD-10-CM

## 2022-10-04 DIAGNOSIS — I77.819 ECTATIC AORTA (HCC): Primary | ICD-10-CM

## 2022-10-04 PROCEDURE — G8484 FLU IMMUNIZE NO ADMIN: HCPCS | Performed by: NURSE PRACTITIONER

## 2022-10-04 PROCEDURE — 4004F PT TOBACCO SCREEN RCVD TLK: CPT | Performed by: NURSE PRACTITIONER

## 2022-10-04 PROCEDURE — G8427 DOCREV CUR MEDS BY ELIG CLIN: HCPCS | Performed by: NURSE PRACTITIONER

## 2022-10-04 PROCEDURE — G8421 BMI NOT CALCULATED: HCPCS | Performed by: NURSE PRACTITIONER

## 2022-10-04 PROCEDURE — 3017F COLORECTAL CA SCREEN DOC REV: CPT | Performed by: NURSE PRACTITIONER

## 2022-10-04 PROCEDURE — 99213 OFFICE O/P EST LOW 20 MIN: CPT | Performed by: NURSE PRACTITIONER

## 2022-10-04 PROCEDURE — 93978 VASCULAR STUDY: CPT

## 2022-10-04 RX ORDER — LOSARTAN POTASSIUM 25 MG/1
TABLET ORAL
COMMUNITY
Start: 2022-09-24

## 2022-10-04 RX ORDER — SUCRALFATE 1 G/1
TABLET ORAL
COMMUNITY
Start: 2022-09-29

## 2022-10-04 NOTE — PROGRESS NOTES
Sonja Miles (:  1960) is a 58 y.o. female,Established patient, here for evaluation of the following chief complaint(s):  Follow-up (Follow up US Aorta/)            SUBJECTIVE/OBJECTIVE:  She has a known history of abdominal aortic aneurysm for 1 - 5 years. She has not had abdominal pain. She has not had back pain in the cervical spine, thoracic spine, lumbar spine and sacral spine region. No post prandial pain. Doing well. Deidre Nathan is a 58 y.o. female with the following history as recorded in [facility]:  Patient Active Problem List    Diagnosis Date Noted    Vitamin D deficiency 2019    Myoclonus 2019    Essential tremor 2019    Memory loss 2019    Osteoporosis 2019    Obstructive sleep apnea 2018    Daytime somnolence 2018    Snoring 2018    Witnessed apneic spells 2018    Sleep disturbance 2018    Insomnia 2018    Ectatic aorta (HCC) 2017    Status post placement of implantable loop recorder     S/P excision of lipoma 2013    Fibrocystic breast disease 2013    Lump of breast, right 2013    Breast nodule 2013    Palpitations 2011    Vasovagal syncope 2011    Chest pain 2011    Arthritis     Lupus (HCC)     Hyperlipidemia     HTN (hypertension)     Fatigue      Current Outpatient Medications   Medication Sig Dispense Refill    PROAIR  (90 Base) MCG/ACT inhaler INHALE 2 PUFFS EVERY 6 HOURS AS NEEDED      atorvastatin (LIPITOR) 10 MG tablet Take 10 mg by mouth daily      carvedilol (COREG) 25 MG tablet Take 25 mg by mouth 2 times daily      Potassium 99 MG TABS Take by mouth      pantoprazole (PROTONIX) 40 MG tablet Take 40 mg by mouth daily  11    gabapentin (NEURONTIN) 300 MG capsule Take 300 mg by mouth 2 times daily.   0    gabapentin (NEURONTIN) 600 MG tablet Take 1.5 tablets by mouth nightly.  0    doxepin (SINEQUAN) 100 MG capsule Take 100 mg by mouth nightly      SUMAtriptan (IMITREX) 100 MG tablet Take 100 mg by mouth once as needed for Migraine      HYDROcodone-acetaminophen (NORCO)  MG per tablet Take 1 tablet by mouth every 6 hours as needed for Pain      hydrochlorothiazide (HYDRODIURIL) 25 MG tablet Take 25 mg by mouth daily      nitroGLYCERIN (NITROSTAT) 0.4 MG SL tablet Place 0.4 mg under the tongue every 5 minutes as needed. losartan (COZAAR) 25 MG tablet TAKE 1 TABLET BY MOUTH DAILY      sucralfate (CARAFATE) 1 GM tablet 1 TABLET BY MOUTH BEFORE MEALS AND AT BEDTIME FOR GASTRIC ULCERS      lamoTRIgine (LAMICTAL) 25 MG tablet       cloNIDine (CATAPRES) 0.3 MG/24HR PTWK PLACE 1 PATCH ON THE SKIN AS DIRECTED BY PROVIDER 1 (ONE) TIME PER WEEK.      levETIRAcetam (KEPPRA) 500 MG tablet Take 500 mg by mouth 2 times daily (Patient not taking: Reported on 9/3/2021)      cloNIDine (CATAPRES) 0.1 MG/24HR PTWK APPLY 1 PATCH ON THE SKIN ONCE A WEEK      vitamin D (ERGOCALCIFEROL) 01138 units CAPS capsule As directed 22 capsule 0    amLODIPine (NORVASC) 5 MG tablet Take 5 mg by mouth daily  5    lisinopril (PRINIVIL;ZESTRIL) 40 MG tablet Take 40 mg by mouth 2 times daily  5    cloNIDine (CATAPRES) 0.1 MG tablet Take 0.1 mg by mouth 2 times daily. No current facility-administered medications for this visit.      Allergies: Latex, Aspirin, Bee venom, and Pollen extract  Past Medical History:   Diagnosis Date    Acid reflux     Anxiety     Arthritis     CAD (coronary artery disease)     CAD (coronary artery disease)     Cervical dysplasia     Cervical spondylosis     Chest pain     Chronic back pain     Chronic bronchitis (HCC)     Chronic kidney disease     Chronic low back pain     Chronic pulmonary disease     COPD (chronic obstructive pulmonary disease) (HCC)     COPD (chronic obstructive pulmonary disease) (HCC)     Degeneration of cervical disc without myelopathy     Degenerative lumbar disc     Depression     Emphysema (subcutaneous) (surgical) resulting from a procedure     Fatigue     HTN (hypertension)     Hyperlipidemia     Hypertension     IBS (irritable bowel syndrome)     Lumbar spondylosis     Lump     right buttock    Lupus (HCC)     MI, old     Osteoarthritis     Osteoarthritis     Peripheral vascular disease ([facility] [address]     Sacroiliitis (HCC)     Seizures ([facility] [address]     remote hx    Status post placement of implantable loop recorder     Syncope     Systemic lupus erythematosus ([facility] [address]     Vasovagal syncope     has a loop recorder    Warts     hands     Past Surgical History:   Procedure Laterality Date    CARDIAC CATHETERIZATION  9/23/11    Normal LV function, Normal coronaries    CARDIAC SURGERY  9/23/11    Loop Recorder placed    CARDIAC SURGERY      defibrillator (added to medical record on 3/22/19)    One [facility] (CERVIX STATUS UNKNOWN)      HUANG with BSO due to cervical dysplasia    LEG SURGERY      X2     LIPOMA RESECTION  10/14/13    bilat sacroiliac lipoma exc    OVARIAN CYST REMOVAL       Family History   Problem Relation Age of Onset    Coronary Art Dis Other     Diabetes Other     Stroke Other     Cancer Mother         lung, and possible breast    Cancer Sister         cervical    Early Death Maternal Grandmother     Cancer Paternal Grandmother         ovarian    Cancer Paternal Grandfather         leukemia     Social History     Tobacco Use    Smoking status: Every Day     Packs/day: 1.00     Years: 40.00     Pack years: 40.00     Types: Cigarettes    Smokeless tobacco: Never   Substance Use Topics    Alcohol use: No       ROS  Eyes - no sudden vision change or amaurosis. Respiratory - no significant shortness of breath,  Cardiovascular - no chest pain or syncope. No  significant leg swelling. No claudication. Musculoskeletal - no gait disturbance  Skin - no new wound. Neurologic -  No speech difficulty or lateralizing weakness. All other review of systems are negative.     Physical Exam    /82 (Site: Right Upper Arm, Position: Sitting, Cuff Size: Medium Adult)   Pulse 82   Temp 97.7 °F (36.5 °C)   SpO2 98%       Neck- carotid pulses 2+ to palpation with no bruit  Cardiovascular - Regular rate and rhythm. Pulmonary - effort appears normal.  No respiratory distress. Lungs - Breath sounds normal. No wheezes or rales. GI - Abdomen - soft, non tender, bowel sounds X 4 quadrants. No guarding or rebound tenderness. No distension or palpable mass. Extremities -  Radial and brachial pulses are 2+ to palpation bilaterally. Right femoral pulse: present 2+; Right popliteal pulse: absent Right DP: absent; Right PT absent; Left femoral pulse: present 2+; Left popliteal pulse: absent; Left DP: absent; Left PT: absent No cyanosis, clubbing, minimal edema. No signs atheroembolic event. Neurologic - alert and oriented X 3. Physiologic. Face symmetric. Skin - warm, dry, and intact. no wound  Psychiatric - mood, affect, and behavior appear normal.  Judgment and thought processes appear normal.    Renal u/s - No evidence of renal artery stenosis    Aortic u/s - shows 2.7 cm dilation  Individual images were reviewed. Results were reviewed with the patient. Reviewed previous studies including: u/s      ASSESSMENT/PLAN:  1. Ectatic aorta (HCC)          Recommend no smoking   Proceed with 24 months with aortic u/s        Symptoms of rupture reviewed with the patient including sudden onset severe back pain or abdominal pain. This pain can sometimes radiate into the groin or leg. The patient may experience a feeling of impending doom or death. If this occurs they have been insturcted to call 911 and get to the emergency room telling them you have an aneurysm. Patient has voiced understanding. An electronic signature was used to authenticate this note.     --JYOTI Lord

## 2022-10-05 PROCEDURE — 93978 VASCULAR STUDY: CPT | Performed by: RADIOLOGY

## 2022-11-23 RX ORDER — ATORVASTATIN CALCIUM 10 MG/1
TABLET, FILM COATED ORAL
Qty: 90 TABLET | Refills: 3 | Status: SHIPPED | OUTPATIENT
Start: 2022-11-23

## 2022-12-23 RX ORDER — NITROGLYCERIN 0.4 MG/1
TABLET SUBLINGUAL
Qty: 25 TABLET | Refills: 1 | Status: SHIPPED | OUTPATIENT
Start: 2022-12-23

## 2022-12-27 ENCOUNTER — TELEPHONE (OUTPATIENT)
Dept: CARDIOLOGY | Facility: CLINIC | Age: 62
End: 2022-12-27

## 2022-12-27 NOTE — TELEPHONE ENCOUNTER
PATIENT IS SCHEDULED FOR A LAPAROSCOPIC PARAESOPHAGEAL HERNIA REPAIR AT [facility].     PLEASE ADVISE.

## 2023-03-01 ENCOUNTER — TELEPHONE (OUTPATIENT)
Dept: CARDIOLOGY | Facility: CLINIC | Age: 63
End: 2023-03-01
Payer: COMMERCIAL

## 2023-03-01 NOTE — TELEPHONE ENCOUNTER
A LETTER WAS RECEIVED FROM DR GAMBOA UPDATING US ON PATIENTS CONDITION POST-OP PARAESOPHAGEAL HERNIA REPAIR.    PLEASE REVIEW/ADVISE IF NEEDED.  SCANNED TO CHART UNDER MEDIA

## 2023-08-21 RX ORDER — CLONIDINE 0.3 MG/24H
1 PATCH, EXTENDED RELEASE TRANSDERMAL WEEKLY
Qty: 5 PATCH | Refills: 0 | Status: SHIPPED | OUTPATIENT
Start: 2023-08-21

## 2023-08-24 RX ORDER — HYDRALAZINE HYDROCHLORIDE 25 MG/1
TABLET, FILM COATED ORAL
Qty: 90 TABLET | Refills: 0 | Status: SHIPPED | OUTPATIENT
Start: 2023-08-24

## 2023-09-28 RX ORDER — CARVEDILOL 25 MG/1
TABLET ORAL
Qty: 60 TABLET | Refills: 0 | Status: SHIPPED | OUTPATIENT
Start: 2023-09-28
